# Patient Record
Sex: FEMALE | Race: WHITE | NOT HISPANIC OR LATINO
[De-identification: names, ages, dates, MRNs, and addresses within clinical notes are randomized per-mention and may not be internally consistent; named-entity substitution may affect disease eponyms.]

---

## 2018-05-25 ENCOUNTER — APPOINTMENT (OUTPATIENT)
Dept: CARDIOLOGY | Facility: CLINIC | Age: 78
End: 2018-05-25

## 2018-06-01 ENCOUNTER — NON-APPOINTMENT (OUTPATIENT)
Age: 78
End: 2018-06-01

## 2018-06-01 ENCOUNTER — APPOINTMENT (OUTPATIENT)
Dept: CARDIOLOGY | Facility: CLINIC | Age: 78
End: 2018-06-01
Payer: MEDICARE

## 2018-06-01 VITALS
HEIGHT: 63 IN | RESPIRATION RATE: 17 BRPM | WEIGHT: 147 LBS | DIASTOLIC BLOOD PRESSURE: 76 MMHG | BODY MASS INDEX: 26.05 KG/M2 | OXYGEN SATURATION: 96 % | SYSTOLIC BLOOD PRESSURE: 158 MMHG | HEART RATE: 76 BPM

## 2018-06-01 PROCEDURE — 99204 OFFICE O/P NEW MOD 45 MIN: CPT

## 2018-06-01 PROCEDURE — 93000 ELECTROCARDIOGRAM COMPLETE: CPT

## 2018-07-06 ENCOUNTER — APPOINTMENT (OUTPATIENT)
Dept: CARDIOLOGY | Facility: CLINIC | Age: 78
End: 2018-07-06
Payer: MEDICARE

## 2018-07-06 PROCEDURE — 93306 TTE W/DOPPLER COMPLETE: CPT

## 2018-07-06 PROCEDURE — 93015 CV STRESS TEST SUPVJ I&R: CPT

## 2018-07-18 ENCOUNTER — APPOINTMENT (OUTPATIENT)
Dept: HEMATOLOGY ONCOLOGY | Facility: CLINIC | Age: 78
End: 2018-07-18
Payer: MEDICARE

## 2018-07-18 VITALS
WEIGHT: 144 LBS | DIASTOLIC BLOOD PRESSURE: 56 MMHG | HEART RATE: 60 BPM | TEMPERATURE: 98.3 F | BODY MASS INDEX: 28.27 KG/M2 | SYSTOLIC BLOOD PRESSURE: 130 MMHG | HEIGHT: 60 IN

## 2018-07-18 DIAGNOSIS — Z98.890 OTHER SPECIFIED POSTPROCEDURAL STATES: ICD-10-CM

## 2018-07-18 DIAGNOSIS — Z92.89 PERSONAL HISTORY OF OTHER MEDICAL TREATMENT: ICD-10-CM

## 2018-07-18 DIAGNOSIS — Z63.4 DISAPPEARANCE AND DEATH OF FAMILY MEMBER: ICD-10-CM

## 2018-07-18 PROCEDURE — 99215 OFFICE O/P EST HI 40 MIN: CPT

## 2018-07-18 RX ORDER — LISINOPRIL 40 MG/1
40 TABLET ORAL
Refills: 0 | Status: ACTIVE | COMMUNITY

## 2018-07-18 RX ORDER — CALCIUM CARBONATE/VITAMIN D3 600 MG-20
600-400 TABLET,CHEWABLE ORAL
Refills: 0 | Status: DISCONTINUED | COMMUNITY
End: 2018-07-18

## 2018-07-18 RX ORDER — CALCITONIN SALMON 200 [IU]/1
200 SPRAY, METERED NASAL
Refills: 0 | Status: ACTIVE | COMMUNITY

## 2018-07-18 SDOH — SOCIAL STABILITY - SOCIAL INSECURITY: DISSAPEARANCE AND DEATH OF FAMILY MEMBER: Z63.4

## 2018-07-31 LAB
ALBUMIN SERPL ELPH-MCNC: 4.5 G/DL
ALP BLD-CCNC: 82 U/L
ALT SERPL-CCNC: 14 U/L
ANION GAP SERPL CALC-SCNC: 13 MMOL/L
AST SERPL-CCNC: 22 U/L
BASOPHILS # BLD AUTO: 0.04 K/UL
BASOPHILS NFR BLD AUTO: 0.4 %
BILIRUB SERPL-MCNC: 0.6 MG/DL
BUN SERPL-MCNC: 22 MG/DL
CALCIUM SERPL-MCNC: 9.8 MG/DL
CHLORIDE SERPL-SCNC: 98 MMOL/L
CO2 SERPL-SCNC: 27 MMOL/L
CREAT SERPL-MCNC: 0.83 MG/DL
CRP SERPL-MCNC: 0.22 MG/DL
EOSINOPHIL # BLD AUTO: 0.54 K/UL
EOSINOPHIL NFR BLD AUTO: 5.7 %
GLUCOSE SERPL-MCNC: 110 MG/DL
HCT VFR BLD CALC: 42 %
HGB BLD-MCNC: 14.2 G/DL
IMM GRANULOCYTES NFR BLD AUTO: 0.3 %
IRON SATN MFR SERPL: 31 %
IRON SERPL-MCNC: 88 UG/DL
LYMPHOCYTES # BLD AUTO: 2.43 K/UL
LYMPHOCYTES NFR BLD AUTO: 25.7 %
MAN DIFF?: NORMAL
MCHC RBC-ENTMCNC: 31.1 PG
MCHC RBC-ENTMCNC: 33.8 GM/DL
MCV RBC AUTO: 92.1 FL
MONOCYTES # BLD AUTO: 0.59 K/UL
MONOCYTES NFR BLD AUTO: 6.2 %
NEUTROPHILS # BLD AUTO: 5.83 K/UL
NEUTROPHILS NFR BLD AUTO: 61.7 %
PLATELET # BLD AUTO: 289 K/UL
POTASSIUM SERPL-SCNC: 4.5 MMOL/L
PROT SERPL-MCNC: 7.4 G/DL
RBC # BLD: 4.56 M/UL
RBC # FLD: 13 %
SODIUM SERPL-SCNC: 138 MMOL/L
TIBC SERPL-MCNC: 280 UG/DL
UIBC SERPL-MCNC: 192 UG/DL
WBC # FLD AUTO: 9.46 K/UL

## 2018-08-01 LAB
ALBUMIN MFR SERPL ELPH: 57.7 %
ALBUMIN SERPL-MCNC: 4.3 G/DL
ALBUMIN/GLOB SERPL: 1.4 RATIO
ALPHA1 GLOB MFR SERPL ELPH: 3.5 %
ALPHA1 GLOB SERPL ELPH-MCNC: 0.3 G/DL
ALPHA2 GLOB MFR SERPL ELPH: 9 %
ALPHA2 GLOB SERPL ELPH-MCNC: 0.7 G/DL
B-GLOBULIN MFR SERPL ELPH: 12.3 %
B-GLOBULIN SERPL ELPH-MCNC: 0.9 G/DL
FERRITIN SERPL-MCNC: 234 NG/ML
GAMMA GLOB FLD ELPH-MCNC: 1.3 G/DL
GAMMA GLOB MFR SERPL ELPH: 17.5 %
INTERPRETATION SERPL IEP-IMP: NORMAL
PROT SERPL-MCNC: 7.4 G/DL
PROT SERPL-MCNC: 7.4 G/DL

## 2018-08-02 LAB — ANA SER IF-ACNC: NEGATIVE

## 2018-08-14 ENCOUNTER — APPOINTMENT (OUTPATIENT)
Dept: HEMATOLOGY ONCOLOGY | Facility: CLINIC | Age: 78
End: 2018-08-14
Payer: MEDICARE

## 2018-08-14 VITALS
BODY MASS INDEX: 28.12 KG/M2 | DIASTOLIC BLOOD PRESSURE: 56 MMHG | WEIGHT: 144 LBS | HEART RATE: 60 BPM | TEMPERATURE: 98.5 F | SYSTOLIC BLOOD PRESSURE: 124 MMHG

## 2018-08-14 DIAGNOSIS — R79.89 OTHER SPECIFIED ABNORMAL FINDINGS OF BLOOD CHEMISTRY: ICD-10-CM

## 2018-08-14 PROCEDURE — 99213 OFFICE O/P EST LOW 20 MIN: CPT

## 2018-10-31 ENCOUNTER — APPOINTMENT (OUTPATIENT)
Dept: ORTHOPEDIC SURGERY | Facility: CLINIC | Age: 78
End: 2018-10-31

## 2019-04-27 ENCOUNTER — APPOINTMENT (OUTPATIENT)
Dept: ULTRASOUND IMAGING | Facility: HOSPITAL | Age: 79
End: 2019-04-27
Payer: MEDICARE

## 2019-04-27 ENCOUNTER — OUTPATIENT (OUTPATIENT)
Dept: OUTPATIENT SERVICES | Facility: HOSPITAL | Age: 79
LOS: 1 days | End: 2019-04-27
Payer: COMMERCIAL

## 2019-04-27 DIAGNOSIS — Z00.8 ENCOUNTER FOR OTHER GENERAL EXAMINATION: ICD-10-CM

## 2019-04-27 DIAGNOSIS — Z98.89 OTHER SPECIFIED POSTPROCEDURAL STATES: Chronic | ICD-10-CM

## 2019-04-27 DIAGNOSIS — Z90.710 ACQUIRED ABSENCE OF BOTH CERVIX AND UTERUS: Chronic | ICD-10-CM

## 2019-04-27 PROCEDURE — 93880 EXTRACRANIAL BILAT STUDY: CPT | Mod: 26

## 2019-04-27 PROCEDURE — 93880 EXTRACRANIAL BILAT STUDY: CPT

## 2019-05-02 ENCOUNTER — APPOINTMENT (OUTPATIENT)
Dept: ULTRASOUND IMAGING | Facility: HOSPITAL | Age: 79
End: 2019-05-02
Payer: MEDICARE

## 2019-05-02 ENCOUNTER — APPOINTMENT (OUTPATIENT)
Dept: MAMMOGRAPHY | Facility: HOSPITAL | Age: 79
End: 2019-05-02
Payer: MEDICARE

## 2019-05-02 ENCOUNTER — APPOINTMENT (OUTPATIENT)
Dept: RADIOLOGY | Facility: HOSPITAL | Age: 79
End: 2019-05-02
Payer: MEDICARE

## 2019-05-02 ENCOUNTER — OUTPATIENT (OUTPATIENT)
Dept: OUTPATIENT SERVICES | Facility: HOSPITAL | Age: 79
LOS: 1 days | End: 2019-05-02
Payer: COMMERCIAL

## 2019-05-02 DIAGNOSIS — Z00.8 ENCOUNTER FOR OTHER GENERAL EXAMINATION: ICD-10-CM

## 2019-05-02 DIAGNOSIS — Z98.89 OTHER SPECIFIED POSTPROCEDURAL STATES: Chronic | ICD-10-CM

## 2019-05-02 DIAGNOSIS — Z90.710 ACQUIRED ABSENCE OF BOTH CERVIX AND UTERUS: Chronic | ICD-10-CM

## 2019-05-02 PROCEDURE — 77067 SCR MAMMO BI INCL CAD: CPT

## 2019-05-02 PROCEDURE — 76641 ULTRASOUND BREAST COMPLETE: CPT

## 2019-05-02 PROCEDURE — 77067 SCR MAMMO BI INCL CAD: CPT | Mod: 26

## 2019-05-02 PROCEDURE — 77080 DXA BONE DENSITY AXIAL: CPT | Mod: 26

## 2019-05-02 PROCEDURE — 77063 BREAST TOMOSYNTHESIS BI: CPT

## 2019-05-02 PROCEDURE — 77063 BREAST TOMOSYNTHESIS BI: CPT | Mod: 26

## 2019-05-02 PROCEDURE — 76641 ULTRASOUND BREAST COMPLETE: CPT | Mod: 26,50

## 2019-05-02 PROCEDURE — 77080 DXA BONE DENSITY AXIAL: CPT

## 2019-10-09 ENCOUNTER — APPOINTMENT (OUTPATIENT)
Dept: ULTRASOUND IMAGING | Facility: HOSPITAL | Age: 79
End: 2019-10-09
Payer: MEDICARE

## 2019-10-09 ENCOUNTER — OUTPATIENT (OUTPATIENT)
Dept: OUTPATIENT SERVICES | Facility: HOSPITAL | Age: 79
LOS: 1 days | End: 2019-10-09
Payer: COMMERCIAL

## 2019-10-09 DIAGNOSIS — Z90.710 ACQUIRED ABSENCE OF BOTH CERVIX AND UTERUS: Chronic | ICD-10-CM

## 2019-10-09 DIAGNOSIS — Z00.8 ENCOUNTER FOR OTHER GENERAL EXAMINATION: ICD-10-CM

## 2019-10-09 DIAGNOSIS — Z98.89 OTHER SPECIFIED POSTPROCEDURAL STATES: Chronic | ICD-10-CM

## 2019-10-09 PROCEDURE — 76856 US EXAM PELVIC COMPLETE: CPT | Mod: 26

## 2019-10-09 PROCEDURE — 76700 US EXAM ABDOM COMPLETE: CPT | Mod: 26

## 2019-10-09 PROCEDURE — 76856 US EXAM PELVIC COMPLETE: CPT

## 2019-10-09 PROCEDURE — 76700 US EXAM ABDOM COMPLETE: CPT

## 2020-02-19 ENCOUNTER — APPOINTMENT (OUTPATIENT)
Dept: HEMATOLOGY ONCOLOGY | Facility: CLINIC | Age: 80
End: 2020-02-19

## 2020-12-22 ENCOUNTER — OUTPATIENT (OUTPATIENT)
Dept: OUTPATIENT SERVICES | Facility: HOSPITAL | Age: 80
LOS: 1 days | End: 2020-12-22
Payer: COMMERCIAL

## 2020-12-22 ENCOUNTER — APPOINTMENT (OUTPATIENT)
Dept: CT IMAGING | Facility: HOSPITAL | Age: 80
End: 2020-12-22
Payer: MEDICARE

## 2020-12-22 DIAGNOSIS — Z98.89 OTHER SPECIFIED POSTPROCEDURAL STATES: Chronic | ICD-10-CM

## 2020-12-22 DIAGNOSIS — Z90.710 ACQUIRED ABSENCE OF BOTH CERVIX AND UTERUS: Chronic | ICD-10-CM

## 2020-12-22 DIAGNOSIS — Z00.8 ENCOUNTER FOR OTHER GENERAL EXAMINATION: ICD-10-CM

## 2020-12-22 PROCEDURE — 74177 CT ABD & PELVIS W/CONTRAST: CPT

## 2020-12-22 PROCEDURE — 74177 CT ABD & PELVIS W/CONTRAST: CPT | Mod: 26

## 2020-12-28 ENCOUNTER — INPATIENT (INPATIENT)
Facility: HOSPITAL | Age: 80
LOS: 2 days | Discharge: HOME | DRG: 391 | End: 2020-12-31
Attending: STUDENT IN AN ORGANIZED HEALTH CARE EDUCATION/TRAINING PROGRAM | Admitting: INTERNAL MEDICINE
Payer: COMMERCIAL

## 2020-12-28 VITALS
TEMPERATURE: 98 F | DIASTOLIC BLOOD PRESSURE: 64 MMHG | RESPIRATION RATE: 18 BRPM | HEART RATE: 91 BPM | WEIGHT: 143.08 LBS | HEIGHT: 62 IN | SYSTOLIC BLOOD PRESSURE: 155 MMHG | OXYGEN SATURATION: 99 %

## 2020-12-28 DIAGNOSIS — N39.0 URINARY TRACT INFECTION, SITE NOT SPECIFIED: ICD-10-CM

## 2020-12-28 DIAGNOSIS — Z98.89 OTHER SPECIFIED POSTPROCEDURAL STATES: Chronic | ICD-10-CM

## 2020-12-28 DIAGNOSIS — Z90.710 ACQUIRED ABSENCE OF BOTH CERVIX AND UTERUS: Chronic | ICD-10-CM

## 2020-12-28 LAB
ALBUMIN SERPL ELPH-MCNC: 4 G/DL — SIGNIFICANT CHANGE UP (ref 3.3–5)
ALP SERPL-CCNC: 99 U/L — SIGNIFICANT CHANGE UP (ref 40–120)
ALT FLD-CCNC: 34 U/L — SIGNIFICANT CHANGE UP (ref 10–45)
ANION GAP SERPL CALC-SCNC: 12 MMOL/L — SIGNIFICANT CHANGE UP (ref 5–17)
APPEARANCE UR: CLEAR — SIGNIFICANT CHANGE UP
AST SERPL-CCNC: 53 U/L — HIGH (ref 10–40)
BACTERIA # UR AUTO: ABNORMAL /HPF
BASOPHILS # BLD AUTO: 0.05 K/UL — SIGNIFICANT CHANGE UP (ref 0–0.2)
BASOPHILS NFR BLD AUTO: 0.2 % — SIGNIFICANT CHANGE UP (ref 0–2)
BILIRUB SERPL-MCNC: 0.7 MG/DL — SIGNIFICANT CHANGE UP (ref 0.2–1.2)
BILIRUB UR-MCNC: NEGATIVE — SIGNIFICANT CHANGE UP
BUN SERPL-MCNC: 30 MG/DL — HIGH (ref 7–23)
CALCIUM SERPL-MCNC: 9.8 MG/DL — SIGNIFICANT CHANGE UP (ref 8.4–10.5)
CHLORIDE SERPL-SCNC: 93 MMOL/L — LOW (ref 96–108)
CO2 SERPL-SCNC: 25 MMOL/L — SIGNIFICANT CHANGE UP (ref 22–31)
COLOR SPEC: YELLOW — SIGNIFICANT CHANGE UP
CREAT SERPL-MCNC: 1.05 MG/DL — SIGNIFICANT CHANGE UP (ref 0.5–1.3)
DIFF PNL FLD: ABNORMAL
EOSINOPHIL # BLD AUTO: 0.01 K/UL — SIGNIFICANT CHANGE UP (ref 0–0.5)
EOSINOPHIL NFR BLD AUTO: 0 % — SIGNIFICANT CHANGE UP (ref 0–6)
EPI CELLS # UR: SIGNIFICANT CHANGE UP
GLUCOSE SERPL-MCNC: 132 MG/DL — HIGH (ref 70–99)
GLUCOSE UR QL: NEGATIVE — SIGNIFICANT CHANGE UP
HCT VFR BLD CALC: 39.4 % — SIGNIFICANT CHANGE UP (ref 34.5–45)
HGB BLD-MCNC: 13.6 G/DL — SIGNIFICANT CHANGE UP (ref 11.5–15.5)
IMM GRANULOCYTES NFR BLD AUTO: 0.9 % — SIGNIFICANT CHANGE UP (ref 0–1.5)
KETONES UR-MCNC: NEGATIVE — SIGNIFICANT CHANGE UP
LACTATE SERPL-SCNC: 1.5 MMOL/L — SIGNIFICANT CHANGE UP (ref 0.7–2)
LEUKOCYTE ESTERASE UR-ACNC: ABNORMAL
LIDOCAIN IGE QN: 168 U/L — SIGNIFICANT CHANGE UP (ref 73–393)
LYMPHOCYTES # BLD AUTO: 0.58 K/UL — LOW (ref 1–3.3)
LYMPHOCYTES # BLD AUTO: 2.9 % — LOW (ref 13–44)
MCHC RBC-ENTMCNC: 32.2 PG — SIGNIFICANT CHANGE UP (ref 27–34)
MCHC RBC-ENTMCNC: 34.5 GM/DL — SIGNIFICANT CHANGE UP (ref 32–36)
MCV RBC AUTO: 93.1 FL — SIGNIFICANT CHANGE UP (ref 80–100)
MONOCYTES # BLD AUTO: 1.24 K/UL — HIGH (ref 0–0.9)
MONOCYTES NFR BLD AUTO: 6.1 % — SIGNIFICANT CHANGE UP (ref 2–14)
NEUTROPHILS # BLD AUTO: 18.18 K/UL — HIGH (ref 1.8–7.4)
NEUTROPHILS NFR BLD AUTO: 89.9 % — HIGH (ref 43–77)
NITRITE UR-MCNC: NEGATIVE — SIGNIFICANT CHANGE UP
NRBC # BLD: 0 /100 WBCS — SIGNIFICANT CHANGE UP (ref 0–0)
OB PNL STL: POSITIVE
PH UR: 6 — SIGNIFICANT CHANGE UP (ref 5–8)
PLATELET # BLD AUTO: 225 K/UL — SIGNIFICANT CHANGE UP (ref 150–400)
POTASSIUM SERPL-MCNC: 3.9 MMOL/L — SIGNIFICANT CHANGE UP (ref 3.5–5.3)
POTASSIUM SERPL-SCNC: 3.9 MMOL/L — SIGNIFICANT CHANGE UP (ref 3.5–5.3)
PROT SERPL-MCNC: 8.5 G/DL — HIGH (ref 6–8.3)
PROT UR-MCNC: NEGATIVE — SIGNIFICANT CHANGE UP
RBC # BLD: 4.23 M/UL — SIGNIFICANT CHANGE UP (ref 3.8–5.2)
RBC # FLD: 12.5 % — SIGNIFICANT CHANGE UP (ref 10.3–14.5)
RBC CASTS # UR COMP ASSIST: SIGNIFICANT CHANGE UP /HPF (ref 0–4)
SODIUM SERPL-SCNC: 130 MMOL/L — LOW (ref 135–145)
SP GR SPEC: 1.01 — SIGNIFICANT CHANGE UP (ref 1.01–1.02)
TROPONIN I SERPL-MCNC: <.017 NG/ML — LOW (ref 0.02–0.06)
UROBILINOGEN FLD QL: NEGATIVE — SIGNIFICANT CHANGE UP
WBC # BLD: 20.25 K/UL — HIGH (ref 3.8–10.5)
WBC # FLD AUTO: 20.25 K/UL — HIGH (ref 3.8–10.5)
WBC UR QL: ABNORMAL /HPF (ref 0–5)

## 2020-12-28 PROCEDURE — 99223 1ST HOSP IP/OBS HIGH 75: CPT

## 2020-12-28 PROCEDURE — 99285 EMERGENCY DEPT VISIT HI MDM: CPT

## 2020-12-28 PROCEDURE — 93010 ELECTROCARDIOGRAM REPORT: CPT

## 2020-12-28 PROCEDURE — 74176 CT ABD & PELVIS W/O CONTRAST: CPT | Mod: 26,MA

## 2020-12-28 PROCEDURE — 71045 X-RAY EXAM CHEST 1 VIEW: CPT | Mod: 26

## 2020-12-28 RX ORDER — LISINOPRIL 2.5 MG/1
40 TABLET ORAL DAILY
Refills: 0 | Status: DISCONTINUED | OUTPATIENT
Start: 2020-12-28 | End: 2020-12-31

## 2020-12-28 RX ORDER — CIPROFLOXACIN LACTATE 400MG/40ML
400 VIAL (ML) INTRAVENOUS ONCE
Refills: 0 | Status: COMPLETED | OUTPATIENT
Start: 2020-12-28 | End: 2020-12-28

## 2020-12-28 RX ORDER — AMLODIPINE BESYLATE 2.5 MG/1
5 TABLET ORAL DAILY
Refills: 0 | Status: DISCONTINUED | OUTPATIENT
Start: 2020-12-28 | End: 2020-12-31

## 2020-12-28 RX ORDER — ONDANSETRON 8 MG/1
4 TABLET, FILM COATED ORAL ONCE
Refills: 0 | Status: COMPLETED | OUTPATIENT
Start: 2020-12-28 | End: 2020-12-28

## 2020-12-28 RX ORDER — SODIUM CHLORIDE 9 MG/ML
1000 INJECTION INTRAMUSCULAR; INTRAVENOUS; SUBCUTANEOUS
Refills: 0 | Status: COMPLETED | OUTPATIENT
Start: 2020-12-28 | End: 2020-12-29

## 2020-12-28 RX ORDER — METRONIDAZOLE 500 MG
500 TABLET ORAL ONCE
Refills: 0 | Status: COMPLETED | OUTPATIENT
Start: 2020-12-28 | End: 2020-12-28

## 2020-12-28 RX ORDER — ACETAMINOPHEN 500 MG
650 TABLET ORAL EVERY 6 HOURS
Refills: 0 | Status: DISCONTINUED | OUTPATIENT
Start: 2020-12-28 | End: 2020-12-31

## 2020-12-28 RX ORDER — ONDANSETRON 8 MG/1
4 TABLET, FILM COATED ORAL EVERY 6 HOURS
Refills: 0 | Status: DISCONTINUED | OUTPATIENT
Start: 2020-12-28 | End: 2020-12-31

## 2020-12-28 RX ORDER — SODIUM CHLORIDE 9 MG/ML
1000 INJECTION INTRAMUSCULAR; INTRAVENOUS; SUBCUTANEOUS ONCE
Refills: 0 | Status: COMPLETED | OUTPATIENT
Start: 2020-12-28 | End: 2020-12-28

## 2020-12-28 RX ADMIN — SODIUM CHLORIDE 1000 MILLILITER(S): 9 INJECTION INTRAMUSCULAR; INTRAVENOUS; SUBCUTANEOUS at 17:20

## 2020-12-28 RX ADMIN — Medication 100 MILLIGRAM(S): at 20:15

## 2020-12-28 RX ADMIN — Medication 500 MILLIGRAM(S): at 21:15

## 2020-12-28 RX ADMIN — SODIUM CHLORIDE 1000 MILLILITER(S): 9 INJECTION INTRAMUSCULAR; INTRAVENOUS; SUBCUTANEOUS at 19:00

## 2020-12-28 RX ADMIN — ONDANSETRON 4 MILLIGRAM(S): 8 TABLET, FILM COATED ORAL at 22:43

## 2020-12-28 RX ADMIN — Medication 200 MILLIGRAM(S): at 21:20

## 2020-12-28 NOTE — H&P ADULT - CLICK TO LAUNCH ORM
Patient notified of test results and Dr Salguero's recommendations. Patient verbalized understanding and agreeable.     .

## 2020-12-28 NOTE — H&P ADULT - NSICDXPASTSURGICALHX_GEN_ALL_CORE_FT
PAST SURGICAL HISTORY:  S/P D&C (status post dilation and curettage) x3    S/P hernia repair 1993    S/P CESARIO (total abdominal hysterectomy)

## 2020-12-28 NOTE — H&P ADULT - NSICDXFAMILYHX_GEN_ALL_CORE_FT
FAMILY HISTORY:  Father  Still living? No  Family history of dementia, Age at diagnosis: Age Unknown    Mother  Still living? No  Family history of diabetes mellitus type II, Age at diagnosis: Age Unknown    Sibling  Still living? No  Family history of cancer, Age at diagnosis: Age Unknown

## 2020-12-28 NOTE — ED PROVIDER NOTE - CONSTITUTIONAL, MLM
Well appearing, awake, alert, oriented to person, place, time/situation and in mild distress. normal...

## 2020-12-28 NOTE — H&P ADULT - NSHPPHYSICALEXAM_GEN_ALL_CORE
Vital Signs (24 Hrs):  T(C): 36.6 (12-28-20 @ 16:14), Max: 36.6 (12-28-20 @ 16:14)  HR: 77 (12-28-20 @ 21:20) (77 - 91)  BP: 138/62 (12-28-20 @ 21:20) (138/62 - 155/65)  RR: 18 (12-28-20 @ 21:20) (16 - 18)  SpO2: 97% (12-28-20 @ 21:20) (97% - 100%)  Daily Height in cm: 157.48 (28 Dec 2020 16:14)

## 2020-12-28 NOTE — ED ADULT NURSE NOTE - OBJECTIVE STATEMENT
80 yr old female to ED A&Ox3 presents with +abd pain and diarrhea. Pt states pain and cramping gets worse when using bathroom. 80 yr old female to ED A&Ox3 presents with +abd pain and diarrhea. Pt states pain and cramping gets worse when using bathroom. Pt also reports +fall when running to go to the bathroom.  As per daughter-in-law pt had a accident and slipped while going to bathroom. +head trauma and LOC.  +bruising noted under right eye and +small abrasion to nose. No acute resp distress noted. Resp even and unlabored. Abd soft ND. No increased tenderness with palp. +BS. +PP. MONTE. Skin warm, dry and normal for race. 20 G IV placed to RT AC. Bloods obtained and sent. Safety maintained.

## 2020-12-28 NOTE — ED PROVIDER NOTE - OBJECTIVE STATEMENT
79 yo female c/o left sided abdominal pain today. pt has ho diverticulosis. no fevers or chills 79 yo female biba from home with  c/o left sided abdominal pain today with n/v/d with some blood and generalized weakness. too weak to walk at one time and  then she called ems pt has ho diverticulosis. no fevers or chills.

## 2020-12-28 NOTE — H&P ADULT - HISTORY OF PRESENT ILLNESS
79 y/o female with HTN, BIBA to the ED because of diffuse crampy abdominal pain, nausea, vomiting and diarrhea that started after lunch.  Pt states she had several bouts of watery diarrhea, crampy abdominal pain, she was feeling so weak and lightheaded.  After having loose BMs, she fell to the floor, because she was dizzy, lightheaded, felt so weak in her legs, but no LOC.  She lives alone, and she called EMS.  While in ED, she had 2 more episodes of loose BM, with some flecks of mucoid blood, and also had an episode of vomiting.  Pt denies sick contacts, no history of travel, no change in diet, no recent antibx use. Denies fever, chills, cough, chest pain, dysuria.  WBC noted to be 20,000 with left shift. Abd CT with diverticulosis but no diverticulitis.  She received a dose of Cipro IV and Flagyl IV in the ED.

## 2020-12-28 NOTE — H&P ADULT - ASSESSMENT
81 y/o female with HTN, presents with diffuse crampy abdominal pain, nausea, vomiting, diarrhea and generalized weakness, lightheadedness.    Acute gastroenteritis, Hyponatremia  Probable UTI    Admit  IV hydration with NS  Hold Thiazide for now  Cont Amlodipine, lisinopril  Antiemetics prn, analgesics prn  Empiric flagyl  ffup labs, ffup culture, stool for ova and parasites, stool culture  DVT prophylaxis    IMPROVE VTE Individual Risk Assessment  RISK                                                                Points  [  ] Previous VTE                                                  3  [  ] Thrombophilia                                               2  [  ] Lower limb paralysis                                      2        (unable to hold up >15 seconds)    [  ] Current Cancer                                              2         (within 6 months)  [  ] Immobilization > 24 hrs                                1  [  ] ICU/CCU stay > 24 hours                              1  [x  ] Age > 60                                                      1  IMPROVE VTE Score __1__  IMPROVE Score 0-1: Low Risk, No VTE prophylaxis required for most patients, encourage ambulation.   IMPROVE Score 2-3: At risk, pharmacologic VTE prophylaxis is indicated for most patients (in the absence of a contraindication)  IMPROVE Score > or = 4: High Risk, pharmacologic VTE prophylaxis is indicated for most patients (in the absence of a contraindication)

## 2020-12-28 NOTE — ED PROVIDER NOTE - FAMILY HISTORY
Father  Still living? No  Family history of dementia, Age at diagnosis: Age Unknown     Mother  Still living? No  Family history of diabetes mellitus type II, Age at diagnosis: Age Unknown     Sibling  Still living? No  Family history of cancer, Age at diagnosis: Age Unknown

## 2020-12-28 NOTE — ED PROVIDER NOTE - PSH
S/P D&C (status post dilation and curettage)  x3  S/P hernia repair  1993  S/P CESARIO (total abdominal hysterectomy)

## 2020-12-29 LAB
ALBUMIN SERPL ELPH-MCNC: 2.8 G/DL — LOW (ref 3.3–5)
ALP SERPL-CCNC: 69 U/L — SIGNIFICANT CHANGE UP (ref 40–120)
ALT FLD-CCNC: 24 U/L — SIGNIFICANT CHANGE UP (ref 10–45)
ANION GAP SERPL CALC-SCNC: 10 MMOL/L — SIGNIFICANT CHANGE UP (ref 5–17)
AST SERPL-CCNC: 27 U/L — SIGNIFICANT CHANGE UP (ref 10–40)
BASOPHILS # BLD AUTO: 0.02 K/UL — SIGNIFICANT CHANGE UP (ref 0–0.2)
BASOPHILS NFR BLD AUTO: 0.2 % — SIGNIFICANT CHANGE UP (ref 0–2)
BILIRUB SERPL-MCNC: 1 MG/DL — SIGNIFICANT CHANGE UP (ref 0.2–1.2)
BUN SERPL-MCNC: 18 MG/DL — SIGNIFICANT CHANGE UP (ref 7–23)
CALCIUM SERPL-MCNC: 8.4 MG/DL — SIGNIFICANT CHANGE UP (ref 8.4–10.5)
CHLORIDE SERPL-SCNC: 99 MMOL/L — SIGNIFICANT CHANGE UP (ref 96–108)
CO2 SERPL-SCNC: 25 MMOL/L — SIGNIFICANT CHANGE UP (ref 22–31)
CREAT SERPL-MCNC: 0.82 MG/DL — SIGNIFICANT CHANGE UP (ref 0.5–1.3)
EOSINOPHIL # BLD AUTO: 0.06 K/UL — SIGNIFICANT CHANGE UP (ref 0–0.5)
EOSINOPHIL NFR BLD AUTO: 0.6 % — SIGNIFICANT CHANGE UP (ref 0–6)
GLUCOSE SERPL-MCNC: 90 MG/DL — SIGNIFICANT CHANGE UP (ref 70–99)
HCT VFR BLD CALC: 32.5 % — LOW (ref 34.5–45)
HGB BLD-MCNC: 11.2 G/DL — LOW (ref 11.5–15.5)
IMM GRANULOCYTES NFR BLD AUTO: 0.4 % — SIGNIFICANT CHANGE UP (ref 0–1.5)
LYMPHOCYTES # BLD AUTO: 1.52 K/UL — SIGNIFICANT CHANGE UP (ref 1–3.3)
LYMPHOCYTES # BLD AUTO: 14.7 % — SIGNIFICANT CHANGE UP (ref 13–44)
MAGNESIUM SERPL-MCNC: 1.7 MG/DL — SIGNIFICANT CHANGE UP (ref 1.6–2.6)
MCHC RBC-ENTMCNC: 31.4 PG — SIGNIFICANT CHANGE UP (ref 27–34)
MCHC RBC-ENTMCNC: 34.5 GM/DL — SIGNIFICANT CHANGE UP (ref 32–36)
MCV RBC AUTO: 91 FL — SIGNIFICANT CHANGE UP (ref 80–100)
MONOCYTES # BLD AUTO: 0.59 K/UL — SIGNIFICANT CHANGE UP (ref 0–0.9)
MONOCYTES NFR BLD AUTO: 5.7 % — SIGNIFICANT CHANGE UP (ref 2–14)
NEUTROPHILS # BLD AUTO: 8.14 K/UL — HIGH (ref 1.8–7.4)
NEUTROPHILS NFR BLD AUTO: 78.4 % — HIGH (ref 43–77)
NRBC # BLD: 0 /100 WBCS — SIGNIFICANT CHANGE UP (ref 0–0)
PLATELET # BLD AUTO: 201 K/UL — SIGNIFICANT CHANGE UP (ref 150–400)
POTASSIUM SERPL-MCNC: 3.2 MMOL/L — LOW (ref 3.5–5.3)
POTASSIUM SERPL-SCNC: 3.2 MMOL/L — LOW (ref 3.5–5.3)
PROT SERPL-MCNC: 6.1 G/DL — SIGNIFICANT CHANGE UP (ref 6–8.3)
RBC # BLD: 3.57 M/UL — LOW (ref 3.8–5.2)
RBC # FLD: 12.5 % — SIGNIFICANT CHANGE UP (ref 10.3–14.5)
SARS-COV-2 RNA SPEC QL NAA+PROBE: SIGNIFICANT CHANGE UP
SODIUM SERPL-SCNC: 134 MMOL/L — LOW (ref 135–145)
WBC # BLD: 10.37 K/UL — SIGNIFICANT CHANGE UP (ref 3.8–10.5)
WBC # FLD AUTO: 10.37 K/UL — SIGNIFICANT CHANGE UP (ref 3.8–10.5)

## 2020-12-29 PROCEDURE — 73030 X-RAY EXAM OF SHOULDER: CPT | Mod: 26,LT

## 2020-12-29 PROCEDURE — 73080 X-RAY EXAM OF ELBOW: CPT | Mod: 26,LT

## 2020-12-29 PROCEDURE — 70450 CT HEAD/BRAIN W/O DYE: CPT | Mod: 26

## 2020-12-29 PROCEDURE — 99233 SBSQ HOSP IP/OBS HIGH 50: CPT

## 2020-12-29 RX ORDER — POTASSIUM CHLORIDE 20 MEQ
40 PACKET (EA) ORAL EVERY 4 HOURS
Refills: 0 | Status: COMPLETED | OUTPATIENT
Start: 2020-12-29 | End: 2020-12-29

## 2020-12-29 RX ORDER — MAGNESIUM SULFATE 500 MG/ML
1 VIAL (ML) INJECTION
Refills: 0 | Status: COMPLETED | OUTPATIENT
Start: 2020-12-29 | End: 2020-12-29

## 2020-12-29 RX ORDER — MAGNESIUM SULFATE 500 MG/ML
2 VIAL (ML) INJECTION ONCE
Refills: 0 | Status: DISCONTINUED | OUTPATIENT
Start: 2020-12-29 | End: 2020-12-29

## 2020-12-29 RX ORDER — METRONIDAZOLE 500 MG
500 TABLET ORAL EVERY 8 HOURS
Refills: 0 | Status: DISCONTINUED | OUTPATIENT
Start: 2020-12-29 | End: 2020-12-29

## 2020-12-29 RX ORDER — LIDOCAINE 4 G/100G
1 CREAM TOPICAL DAILY
Refills: 0 | Status: DISCONTINUED | OUTPATIENT
Start: 2020-12-29 | End: 2020-12-31

## 2020-12-29 RX ADMIN — Medication 650 MILLIGRAM(S): at 01:18

## 2020-12-29 RX ADMIN — Medication 650 MILLIGRAM(S): at 17:33

## 2020-12-29 RX ADMIN — SODIUM CHLORIDE 75 MILLILITER(S): 9 INJECTION INTRAMUSCULAR; INTRAVENOUS; SUBCUTANEOUS at 00:15

## 2020-12-29 RX ADMIN — AMLODIPINE BESYLATE 5 MILLIGRAM(S): 2.5 TABLET ORAL at 05:35

## 2020-12-29 RX ADMIN — LISINOPRIL 40 MILLIGRAM(S): 2.5 TABLET ORAL at 05:35

## 2020-12-29 RX ADMIN — Medication 40 MILLIEQUIVALENT(S): at 13:01

## 2020-12-29 RX ADMIN — Medication 100 GRAM(S): at 10:41

## 2020-12-29 RX ADMIN — Medication 1 TABLET(S): at 05:35

## 2020-12-29 RX ADMIN — Medication 650 MILLIGRAM(S): at 02:18

## 2020-12-29 RX ADMIN — Medication 650 MILLIGRAM(S): at 18:28

## 2020-12-29 RX ADMIN — Medication 1 TABLET(S): at 13:03

## 2020-12-29 RX ADMIN — LIDOCAINE 1 PATCH: 4 CREAM TOPICAL at 19:38

## 2020-12-29 RX ADMIN — Medication 40 MILLIEQUIVALENT(S): at 09:13

## 2020-12-29 RX ADMIN — Medication 100 MILLIGRAM(S): at 05:36

## 2020-12-29 RX ADMIN — LIDOCAINE 1 PATCH: 4 CREAM TOPICAL at 13:00

## 2020-12-29 NOTE — PHYSICAL THERAPY INITIAL EVALUATION ADULT - IMPAIRMENTS FOUND, PT EVAL
gait, locomotion, and balance
Have Your Skin Lesions Been Treated?: not been treated
Is This A New Presentation, Or A Follow-Up?: Skin Lesions
How Severe Is Your Skin Lesion?: mild

## 2020-12-29 NOTE — PROGRESS NOTE ADULT - ASSESSMENT
79 y/o female with HTN, presents with diffuse crampy abdominal pain, nausea, vomiting, diarrhea and generalized weakness, lightheadedness.  Suspect due to viral gastroenteritis.    *Abdominal Pain:  *Mucousy stools:  Rule out infectious etiology  CTAP with diverticulosis   Obtain stool O+P and culture  Cont IVF  Monitor BM--has not had BM today  Hold abx for now as symptoms most likely viral   Afebrile and leukocytosis resolved    *Leukocytosis:  suspect due to dehydration  Resolved  Hold abx for now and continue to monitor    *Syncope:  suspect due to dehydration  Cont IVF  Troponin negative  Obtain CT head   Obtain TTE to be complete  PT recommended home with home PT   Hold Thiazide for now    *HTN:  Cont Amlodipine, lisinopril    Dispo: Home pending further workup. Suspect within 24 hours.  Case d/w son Дмитрий    81 y/o female with HTN, presents with diffuse crampy abdominal pain, nausea, vomiting, diarrhea and generalized weakness, lightheadedness.  Suspect due to viral gastroenteritis.    *Abdominal Pain:  *Mucousy stools:  Rule out infectious etiology  CTAP with diverticulosis   Obtain stool O+P and culture  Cont IVF  Monitor BM--has not had BM today  Hold abx for now as symptoms most likely viral   Afebrile and leukocytosis resolved    *Leukocytosis:  suspect due to dehydration  Resolved  Hold abx for now and continue to monitor    *HypoKalemia:  K 3.2  due to GI losses  replete and monitor     *Syncope:  suspect due to dehydration  Cont IVF  Troponin negative  Obtain CT head   Obtain TTE to be complete  PT recommended home with home PT   Hold Thiazide for now    *HTN:  Cont Amlodipine, lisinopril    Dispo: Home pending further workup. Suspect within 24 hours.  Case d/w son Дмитрий    81 y/o female with HTN, presents with diffuse crampy abdominal pain, nausea, vomiting, diarrhea and generalized weakness, lightheadedness.  Suspect due to viral gastroenteritis.    *Abdominal Pain:  *Mucousy stools/diarrhea   -Rule out infectious etiology  -CTAP with diverticulosis   -Obtain stool O+P and culture  -Monitor BM--had formed BM today  -Hold abx for now as symptoms most likely viral   -Afebrile and leukocytosis resolved    *Leukocytosis:  suspect due to dehydration  Resolved  Hold abx for now and continue to monitor    *HypoKalemia:  K 3.2  due to GI losses  replete and monitor     *Hyponatremia  Na 134  s/p IVF  PO diet encouraged  Follow up AM BMP    *Anemia  -Likely due to diverticulosis   -No overt signs of bleeding, monitor H/H  -Continue to monitor     *Syncope:  suspect due to dehydration ?vasovagal etiology  Off IVF  Troponin negative  CT head performed  Obtain TTE to be complete  PT recommended home with home PT   Hold Thiazide for now    *HTN:  Cont Amlodipine, lisinopril  Monitor vitals    Dispo: Home pending further workup. Suspect within 24 hours.  Case d/w son Дмитрий

## 2020-12-29 NOTE — PHYSICAL THERAPY INITIAL EVALUATION ADULT - PERTINENT HX OF CURRENT PROBLEM, REHAB EVAL
pt BIBA to ED due to abdominal pain, nausea, vomiting & diarrhea, pt s/p fall due to feeling lightheaded 2* diarrhea

## 2020-12-29 NOTE — PROGRESS NOTE ADULT - SUBJECTIVE AND OBJECTIVE BOX
Patient is a 80y old  Female who presents with a chief complaint of abdominal pain, diarrhea, vomiting, generalized weakness (28 Dec 2020 23:05)   No acute issues     Patient seen and examined at bedside.    ALLERGIES:  No Known Allergies    MEDICATIONS  (STANDING):  amLODIPine   Tablet 5 milliGRAM(s) Oral daily  calcium carbonate 1250 mG  + Vitamin D (OsCal 500 + D) 1 Tablet(s) Oral two times a day  lidocaine   Patch 1 Patch Transdermal daily  lisinopril 40 milliGRAM(s) Oral daily    MEDICATIONS  (PRN):  acetaminophen   Tablet .. 650 milliGRAM(s) Oral every 6 hours PRN Temp greater or equal to 38C (100.4F), Mild Pain (1 - 3)  ondansetron Injectable 4 milliGRAM(s) IV Push every 6 hours PRN Nausea and/or Vomiting    Vital Signs Last 24 Hrs  T(F): 98.1 (29 Dec 2020 11:20), Max: 98.4 (29 Dec 2020 00:50)  HR: 74 (29 Dec 2020 11:20) (65 - 91)  BP: 129/48 (29 Dec 2020 11:20) (124/54 - 155/65)  RR: 15 (29 Dec 2020 11:20) (15 - 18)  SpO2: 96% (29 Dec 2020 11:20) (92% - 100%)  I&O's Summary    28 Dec 2020 07:  -  29 Dec 2020 07:00  --------------------------------------------------------  IN: 0 mL / OUT: 1100 mL / NET: -1100 mL    29 Dec 2020 07:01  -  29 Dec 2020 14:28  --------------------------------------------------------  IN: 400 mL / OUT: 0 mL / NET: 400 mL      BMI (kg/m2): 28.7 (20 @ 01:02)  PHYSICAL EXAM:  General: NAD, A/O x 3, elderly, small ecchymosis R forehead  ENT: MMM, no thrush  Neck: Supple, No JVD  Lungs: Non labored breathing,  Clear to auscultation bilaterally,   Cardio: RRR, S1/S2,no pitting edema bilaterally  Abdomen: Soft, Nontender, Nondistended; Bowel sounds present  Extremities: No calf tenderness, moves all extremities    LABS:                        11.2   10.37 )-----------( 201      ( 29 Dec 2020 06:00 )             32.5           134  |  99  |  18  ----------------------------<  90  3.2   |  25  |  0.82    Ca    8.4      29 Dec 2020 06:00  Mg     1.7         TPro  6.1  /  Alb  2.8  /  TBili  1.0  /  DBili  x   /  AST  27  /  ALT  24  /  AlkPhos  69       eGFR if Non African American: 68 mL/min/1.73M2 (20 @ 06:00)  eGFR if African American: 78 mL/min/1.73M2 (20 @ 06:00)       Lactate, Blood: 1.5 mmol/L ( @ 21:30)    CARDIAC MARKERS ( 28 Dec 2020 17:20 )  <.017 ng/mL / x     / x     / x     / x                            Urinalysis Basic - ( 28 Dec 2020 19:25 )    Color: Yellow / Appearance: Clear / S.010 / pH: x  Gluc: x / Ketone: Negative  / Bili: Negative / Urobili: Negative   Blood: x / Protein: Negative / Nitrite: Negative   Leuk Esterase: Moderate / RBC: 0-4 /HPF / WBC 11-25 /HPF   Sq Epi: x / Non Sq Epi: Neg.-Few / Bacteria: Trace /HPF          RADIOLOGY & ADDITIONAL TESTS:    Care Discussed with Consultants/Other Providers:    Patient is a 80y old  Female who presents with a chief complaint of abdominal pain, diarrhea, vomiting, generalized weakness (28 Dec 2020 23:05)   No acute issues     Patient seen and examined at bedside. Reports improvement in diarrhea, more formed stool.   Tolerating diet. Feels back to baseline.     ALLERGIES:  No Known Allergies    MEDICATIONS  (STANDING):  amLODIPine   Tablet 5 milliGRAM(s) Oral daily  calcium carbonate 1250 mG  + Vitamin D (OsCal 500 + D) 1 Tablet(s) Oral two times a day  lidocaine   Patch 1 Patch Transdermal daily  lisinopril 40 milliGRAM(s) Oral daily    MEDICATIONS  (PRN):  acetaminophen   Tablet .. 650 milliGRAM(s) Oral every 6 hours PRN Temp greater or equal to 38C (100.4F), Mild Pain (1 - 3)  ondansetron Injectable 4 milliGRAM(s) IV Push every 6 hours PRN Nausea and/or Vomiting    Vital Signs Last 24 Hrs  T(F): 98.1 (29 Dec 2020 11:20), Max: 98.4 (29 Dec 2020 00:50)  HR: 74 (29 Dec 2020 11:20) (65 - 91)  BP: 129/48 (29 Dec 2020 11:20) (124/54 - 155/65)  RR: 15 (29 Dec 2020 11:20) (15 - 18)  SpO2: 96% (29 Dec 2020 11:20) (92% - 100%)  I&O's Summary    28 Dec 2020 07:  -  29 Dec 2020 07:00  --------------------------------------------------------  IN: 0 mL / OUT: 1100 mL / NET: -1100 mL    29 Dec 2020 07:  -  29 Dec 2020 14:28  --------------------------------------------------------  IN: 400 mL / OUT: 0 mL / NET: 400 mL      BMI (kg/m2): 28.7 (20 @ 01:02)  PHYSICAL EXAM:  General: NAD, A/O x 3, elderly, small ecchymosis R forehead  ENT: MMM, no thrush  Neck: Supple, No JVD  Lungs: Non labored breathing,  Clear to auscultation bilaterally,   Cardio: RRR, S1/S2,no pitting edema bilaterally  Abdomen: Soft, Nontender, Nondistended; Bowel sounds present  Extremities: No calf tenderness, moves all extremities    LABS:                        11.2   10.37 )-----------( 201      ( 29 Dec 2020 06:00 )             32.5           134  |  99  |  18  ----------------------------<  90  3.2   |  25  |  0.82    Ca    8.4      29 Dec 2020 06:00  Mg     1.7         TPro  6.1  /  Alb  2.8  /  TBili  1.0  /  DBili  x   /  AST  27  /  ALT  24  /  AlkPhos  69       eGFR if Non African American: 68 mL/min/1.73M2 (20 @ 06:00)  eGFR if African American: 78 mL/min/1.73M2 (20 @ 06:00)       Lactate, Blood: 1.5 mmol/L ( @ 21:30)    CARDIAC MARKERS ( 28 Dec 2020 17:20 )  <.017 ng/mL / x     / x     / x     / x                            Urinalysis Basic - ( 28 Dec 2020 19:25 )    Color: Yellow / Appearance: Clear / S.010 / pH: x  Gluc: x / Ketone: Negative  / Bili: Negative / Urobili: Negative   Blood: x / Protein: Negative / Nitrite: Negative   Leuk Esterase: Moderate / RBC: 0-4 /HPF / WBC 11-25 /HPF   Sq Epi: x / Non Sq Epi: Neg.-Few / Bacteria: Trace /HPF          RADIOLOGY & ADDITIONAL TESTS:  < from: CT Head No Cont (20 @ 10:58) >    EXAM:  CT BRAIN      PROCEDURE DATE:  2020        INTERPRETATION:  CT head without IV contrast    CLINICAL INFORMATION:  Fall, ecchymosis about eyes    TECHNIQUE: Contiguous axial 5 mm sections were obtained through the head. Sagittal and coronal 2-D reformatted images were also obtained.   This scan was performed using automatic exposure control (radiation dose reduction software) to obtain a diagnostic image quality scan with patient dose as low as reasonably achievable.    FINDINGS:   No previous examinations are available for review.    The brain demonstrates mild periventricular white matter ischemia.   No acute cerebral cortical infarct is seen.  No intracranial hemorrhage is found.  No mass effect is found in the brain.    The ventricles, sulci and basal cisterns show age-appropriate atrophy.    The orbits are unremarkable.  The paranasal sinuses are significant for minimal mucosal thickening in the RIGHT maxillary sinus.  The nasal cavity appears intact.  The nasopharynx is symmetric.  The central skull base, petrous temporal bones and calvarium remain intact.      IMPRESSION:   Mild periventricular white matter ischemia. Age-appropriate atrophy.    Minimal mucosal thickening in RIGHT maxillary sinus.                ROSENDA SIMON MD; Attending Radiologist  This document has been electronically signed. Dec 29 2020 11:24AM    < end of copied text >    CT head personally reviewed - no acute hemorrhage/mass noted  Care Discussed with Consultants/Other Providers:

## 2020-12-29 NOTE — PHYSICAL THERAPY INITIAL EVALUATION ADULT - ADDITIONAL COMMENTS
pt reports she lives alone, 1 step to enter, no steps inside, she is normally independent with ADLs and mobility, occasional use of cane, she reports she has a daughter that lives nearby, pt is concerned to be home alone, reports her daughter was looking into getting her some assistance

## 2020-12-30 ENCOUNTER — TRANSCRIPTION ENCOUNTER (OUTPATIENT)
Age: 80
End: 2020-12-30

## 2020-12-30 DIAGNOSIS — K92.2 GASTROINTESTINAL HEMORRHAGE, UNSPECIFIED: ICD-10-CM

## 2020-12-30 LAB
ALBUMIN SERPL ELPH-MCNC: 2.7 G/DL — LOW (ref 3.3–5)
ALP SERPL-CCNC: 62 U/L — SIGNIFICANT CHANGE UP (ref 40–120)
ALT FLD-CCNC: 23 U/L — SIGNIFICANT CHANGE UP (ref 10–45)
ANION GAP SERPL CALC-SCNC: 8 MMOL/L — SIGNIFICANT CHANGE UP (ref 5–17)
AST SERPL-CCNC: 26 U/L — SIGNIFICANT CHANGE UP (ref 10–40)
BILIRUB SERPL-MCNC: 0.9 MG/DL — SIGNIFICANT CHANGE UP (ref 0.2–1.2)
BUN SERPL-MCNC: 16 MG/DL — SIGNIFICANT CHANGE UP (ref 7–23)
CALCIUM SERPL-MCNC: 8.8 MG/DL — SIGNIFICANT CHANGE UP (ref 8.4–10.5)
CHLORIDE SERPL-SCNC: 105 MMOL/L — SIGNIFICANT CHANGE UP (ref 96–108)
CO2 SERPL-SCNC: 26 MMOL/L — SIGNIFICANT CHANGE UP (ref 22–31)
CREAT SERPL-MCNC: 0.77 MG/DL — SIGNIFICANT CHANGE UP (ref 0.5–1.3)
GLUCOSE SERPL-MCNC: 79 MG/DL — SIGNIFICANT CHANGE UP (ref 70–99)
HCT VFR BLD CALC: 32.3 % — LOW (ref 34.5–45)
HGB BLD-MCNC: 10.9 G/DL — LOW (ref 11.5–15.5)
MAGNESIUM SERPL-MCNC: 1.8 MG/DL — SIGNIFICANT CHANGE UP (ref 1.6–2.6)
MCHC RBC-ENTMCNC: 31.4 PG — SIGNIFICANT CHANGE UP (ref 27–34)
MCHC RBC-ENTMCNC: 33.7 GM/DL — SIGNIFICANT CHANGE UP (ref 32–36)
MCV RBC AUTO: 93.1 FL — SIGNIFICANT CHANGE UP (ref 80–100)
NRBC # BLD: 0 /100 WBCS — SIGNIFICANT CHANGE UP (ref 0–0)
PLATELET # BLD AUTO: 185 K/UL — SIGNIFICANT CHANGE UP (ref 150–400)
POTASSIUM SERPL-MCNC: 4.3 MMOL/L — SIGNIFICANT CHANGE UP (ref 3.5–5.3)
POTASSIUM SERPL-SCNC: 4.3 MMOL/L — SIGNIFICANT CHANGE UP (ref 3.5–5.3)
PROT SERPL-MCNC: 6 G/DL — SIGNIFICANT CHANGE UP (ref 6–8.3)
RBC # BLD: 3.47 M/UL — LOW (ref 3.8–5.2)
RBC # FLD: 12.9 % — SIGNIFICANT CHANGE UP (ref 10.3–14.5)
SODIUM SERPL-SCNC: 139 MMOL/L — SIGNIFICANT CHANGE UP (ref 135–145)
WBC # BLD: 8.63 K/UL — SIGNIFICANT CHANGE UP (ref 3.8–10.5)
WBC # FLD AUTO: 8.63 K/UL — SIGNIFICANT CHANGE UP (ref 3.8–10.5)

## 2020-12-30 PROCEDURE — 93306 TTE W/DOPPLER COMPLETE: CPT | Mod: 26

## 2020-12-30 PROCEDURE — 99233 SBSQ HOSP IP/OBS HIGH 50: CPT

## 2020-12-30 RX ADMIN — LIDOCAINE 1 PATCH: 4 CREAM TOPICAL at 19:44

## 2020-12-30 RX ADMIN — Medication 650 MILLIGRAM(S): at 09:38

## 2020-12-30 RX ADMIN — Medication 1 TABLET(S): at 15:53

## 2020-12-30 RX ADMIN — LIDOCAINE 1 PATCH: 4 CREAM TOPICAL at 09:36

## 2020-12-30 RX ADMIN — LISINOPRIL 40 MILLIGRAM(S): 2.5 TABLET ORAL at 05:28

## 2020-12-30 RX ADMIN — LIDOCAINE 1 PATCH: 4 CREAM TOPICAL at 21:30

## 2020-12-30 RX ADMIN — Medication 650 MILLIGRAM(S): at 10:10

## 2020-12-30 RX ADMIN — AMLODIPINE BESYLATE 5 MILLIGRAM(S): 2.5 TABLET ORAL at 05:28

## 2020-12-30 RX ADMIN — Medication 1 TABLET(S): at 05:28

## 2020-12-30 RX ADMIN — LIDOCAINE 1 PATCH: 4 CREAM TOPICAL at 01:00

## 2020-12-30 NOTE — DISCHARGE NOTE PROVIDER - HOSPITAL COURSE
79 y/o female with HTN, presents with syncope after multiple episodes of diarrhea.  Admitted to hospitalist.  Syncope most likely due GI losses due to viral gastroenteritis.  Syncope work up: CT head unremarkable.  TTE with no acute findings. Trop negative. EKG nonischemic.    Patient stool guiac positive.  Stool cx ________.  CT AP with diverticulosis. GI consulted and recommended __________    Patient with shoulder and elbow pain after fall. L elbow X ray with hairline fracture. Ortho consulted and recommended ________    PT recommended home PT  Patient medically stable for discharge home with outpatient follow up 79 y/o female with HTN, presents with syncope after multiple episodes of diarrhea.  Admitted to hospitalist.  Syncope most likely due GI losses due to viral gastroenteritis.  Syncope work up: CT head unremarkable.  TTE with no acute findings. Trop negative. EKG nonischemic.    Patient stool guiac positive. CT AP with diverticulosis. H&H stable. GI consulted and recommended outpatient follow up.   Patient with shoulder and elbow pain after fall. L elbow X ray hairline fracture lateral epicondyle l elbow. Ortho consulted and recommended sling if ROM or pain worsens, and follow up with Dr. Lagunas in office on  TUESDAY 1/5/21.    PT recommended home PT  Patient medically stable for discharge home with outpatient follow up     PMD: Dr. Godwin - notified 81 y/o female with HTN, presents with syncope after multiple episodes of diarrhea.  Admitted to hospitalist.  Syncope most likely due GI losses due to viral gastroenteritis.  Syncope work up: CT head unremarkable.  TTE with no acute findings. Trop negative. EKG nonischemic.    Patient stool guiac positive. CT AP with diverticulosis. H&H stable. GI consulted and recommended outpatient follow up.   Patient with shoulder and elbow pain after fall. L elbow X ray hairline fracture lateral epicondyle l elbow. Ortho consulted and recommended sling if ROM or pain worsens, and follow up with Dr. Lagunas in office on  TUESDAY 1/5/21.    PT recommended home PT  Patient medically stable for discharge home with outpatient follow up     PMD: Dr. Godwin - notified     Time Spent: 45 minutes

## 2020-12-30 NOTE — DISCHARGE NOTE PROVIDER - CARE PROVIDER_API CALL
Skip Godwin (DO)  Internal Medicine  [address] [place] [address]  Phone: [phone]  Fax: [phone]  Follow Up Time:     CAROLINA SEAY  GASTROENTEROLOGY  [address] [address]  Phone: [phone]  Fax: [phone]  Follow Up Time:     Zafar Lagunas  ORTHOPAEDIC SURGERY  [address] [facility] [address]  [place] [address]  Phone: [phone]  Fax: [phone]  Follow Up Time:

## 2020-12-30 NOTE — DISCHARGE NOTE PROVIDER - NSDCMRMEDTOKEN_GEN_ALL_CORE_FT
amLODIPine 5 mg oral tablet: orally 2 times a day  calcitonin 200 intl units/inh nasal spray: 1 spray(s) nasal once a day  Caltrate 600 + D 600 mg-800 intl units oral tablet: 1 tab(s) orally 2 times a day  hydroCHLOROthiazide 12.5 mg oral capsule: 1 cap(s) orally once a day  lisinopril 40 mg oral tablet: 1 tab(s) orally once a day   acetaminophen 325 mg oral tablet: 2 tab(s) orally every 6 hours, As needed, Temp greater or equal to 38C (100.4F), Mild Pain (1 - 3)  amLODIPine 5 mg oral tablet: orally 2 times a day  Caltrate 600 + D 600 mg-800 intl units oral tablet: 1 tab(s) orally 2 times a day  hydroCHLOROthiazide 12.5 mg oral capsule: 1 cap(s) orally once a day  lisinopril 40 mg oral tablet: 1 tab(s) orally once a day

## 2020-12-30 NOTE — PROGRESS NOTE ADULT - ASSESSMENT
79 y/o female with HTN, presents with diffuse crampy abdominal pain, nausea, vomiting, diarrhea and generalized weakness, lightheadedness.    *Abdominal Pain:  *Mucousy stools/diarrhea   -Rule out infectious etiology  -CTAP with diverticulosis   -Obtain stool O+P and culture  -Monitor BM--had3 loose BM yesterday  -Hold abx for now as symptoms most likely viral   -Afebrile and leukocytosis resolved    *Guaic positive:  suspect due to diverticulosis  Given mild anemia, obtain GI consult with DR Nelson (last cscope 5 years ago)    *Leukocytosis:  suspect due to dehydration  Resolved  Hold abx for now and continue to monitor    *HypoKalemia:  resolved  due to GI losses    *Hyponatremia  Resolved   s/p IVF  PO diet encouraged  Follow up AM BMP    *Anemia  -Likely due to diverticulosis   -Obtain GI consult     *Syncope:  suspect due to dehydration ?vasovagal etiology  Off IVF  Troponin negative  CT head performed  TTE pending  PT recommended home with home PT   Hold Thiazide for now    *Shoulder Pain:  L should pain  suspect due to fall  Follow up shoulder X rays     *HTN:  Cont Amlodipine, lisinopril  Monitor vitals    Dispo: Home pending further workup. Suspect within 24 hours.  Case d/w son Дмитрий and daughter in law, [place]    Discussed with DR Nelson   79 y/o female with HTN, presents with diffuse crampy abdominal pain, nausea, vomiting, diarrhea and generalized weakness, lightheadedness.    *Abdominal Pain:  *Mucousy stools/diarrhea   -Rule out infectious etiology  -CTAP with diverticulosis   -Obtain stool O+P and culture  -Monitor BM--had3 loose BM yesterday  -Hold abx for now as symptoms most likely viral   -Afebrile and leukocytosis resolved    *Guaic positive:  suspect due to diverticulosis  Given mild anemia, obtain GI consult with DR Nelson (last cscope 5 years ago)    *Leukocytosis:  suspect due to dehydration  Resolved  Hold abx for now and continue to monitor    *HypoKalemia:  resolved  due to GI losses    *Hyponatremia  Resolved   s/p IVF  PO diet encouraged  Follow up AM BMP    *Anemia. Acute blood loss   -Likely due to diverticulosis   -Obtain GI consult     *Syncope:  suspect due to dehydration ?vasovagal etiology  Off IVF  Troponin negative  CT head performed  TTE pending  PT recommended home with home PT   Hold Thiazide for now    *Shoulder Pain:  L should pain  suspect due to fall  Follow up shoulder X rays     *HTN:  Cont Amlodipine, lisinopril  Monitor vitals    Dispo: Home pending further workup. Suspect within 24 hours.  Case d/w son Дмитрий and daughter in law, [place]    Discussed with DR Nelson

## 2020-12-30 NOTE — CONSULT NOTE ADULT - PROBLEM SELECTOR RECOMMENDATION 9
? diverticular bleed vs hemorrhoids  Monitor stool  Monitor H/H  May d/c home with outpatient GI follow up

## 2020-12-30 NOTE — DISCHARGE NOTE PROVIDER - PROVIDER TOKENS
PROVIDER:[TOKEN:[[id number]]],PROVIDER:[TOKEN:[[id number]]],PROVIDER:[TOKEN:[[id number]]]

## 2020-12-30 NOTE — PROGRESS NOTE ADULT - SUBJECTIVE AND OBJECTIVE BOX
Patient is a 80y old  Female who presents with a chief complaint of abdominal pain, diarrhea, vomiting, generalized weakness (29 Dec 2020 14:28). No acute issues overnight. 3 loose BMs yesterday       Patient seen and examined at bedside.    ALLERGIES:  No Known Allergies    MEDICATIONS  (STANDING):  amLODIPine   Tablet 5 milliGRAM(s) Oral daily  calcium carbonate 1250 mG  + Vitamin D (OsCal 500 + D) 1 Tablet(s) Oral two times a day  lidocaine   Patch 1 Patch Transdermal daily  lisinopril 40 milliGRAM(s) Oral daily    MEDICATIONS  (PRN):  acetaminophen   Tablet .. 650 milliGRAM(s) Oral every 6 hours PRN Temp greater or equal to 38C (100.4F), Mild Pain (1 - 3)  ondansetron Injectable 4 milliGRAM(s) IV Push every 6 hours PRN Nausea and/or Vomiting    Vital Signs Last 24 Hrs  T(F): 98 (30 Dec 2020 07:53), Max: 99.5 (29 Dec 2020 16:13)  HR: 71 (30 Dec 2020 07:53) (64 - 80)  BP: 131/66 (30 Dec 2020 07:53) (119/56 - 147/62)  RR: 16 (30 Dec 2020 07:53) (15 - 19)  SpO2: 96% (30 Dec 2020 07:53) (95% - 97%)  I&O's Summary    29 Dec 2020 07:  -  30 Dec 2020 07:00  --------------------------------------------------------  IN: 640 mL / OUT: 0 mL / NET: 640 mL    30 Dec 2020 07:  -  30 Dec 2020 10:08  --------------------------------------------------------  IN: 240 mL / OUT: 0 mL / NET: 240 mL      BMI (kg/m2): 28.7 (12-29-20 @ 01:02)  PHYSICAL EXAM:  General: NAD, A/O x 3  ENT: MMM, no thrush  Neck: Supple, No JVD  Lungs: Non labored breathing,  Clear to auscultation bilaterally,   Cardio: RRR, S1/S2, No murmurs, no pitting edema bilaterally  Abdomen: Soft, Nontender, Nondistended; Bowel sounds present  Extremities: No calf tenderness, moves all extremities    LABS:                        10.9   8.63  )-----------( 185      ( 30 Dec 2020 06:00 )             32.3           139  |  105  |  16  ----------------------------<  79  4.3   |  26  |  0.77    Ca    8.8      30 Dec 2020 06:00  Mg     1.8         TPro  6.0  /  Alb  2.7  /  TBili  0.9  /  DBili  x   /  AST  26  /  ALT  23  /  AlkPhos  62       eGFR if Non African American: 73 mL/min/1.73M2 (20 @ 06:00)  eGFR if : 84 mL/min/1.73M2 (20 @ 06:00)       Lactate, Blood: 1.5 mmol/L ( @ 21:30)    CARDIAC MARKERS ( 28 Dec 2020 17:20 )  <.017 ng/mL / x     / x     / x     / x                            Urinalysis Basic - ( 28 Dec 2020 19:25 )    Color: Yellow / Appearance: Clear / S.010 / pH: x  Gluc: x / Ketone: Negative  / Bili: Negative / Urobili: Negative   Blood: x / Protein: Negative / Nitrite: Negative   Leuk Esterase: Moderate / RBC: 0-4 /HPF / WBC 11-25 /HPF   Sq Epi: x / Non Sq Epi: Neg.-Few / Bacteria: Trace /HPF        Culture - Blood (collected 28 Dec 2020 21:30)  Source: .Blood Blood-Venous  Preliminary Report (30 Dec 2020 05:01):    No growth to date.    Culture - Blood (collected 28 Dec 2020 21:30)  Source: .Blood Blood-Peripheral  Preliminary Report (30 Dec 2020 05:01):    No growth to date.        RADIOLOGY & ADDITIONAL TESTS:    Care Discussed with Consultants/Other Providers:    Patient is a 80y old  Female who presents with a chief complaint of abdominal pain, diarrhea, vomiting, generalized weakness (29 Dec 2020 14:28). No acute issues overnight. 3 loose BMs yesterday       Patient seen and examined at bedside.    ALLERGIES:  No Known Allergies    MEDICATIONS  (STANDING):  amLODIPine   Tablet 5 milliGRAM(s) Oral daily  calcium carbonate 1250 mG  + Vitamin D (OsCal 500 + D) 1 Tablet(s) Oral two times a day  lidocaine   Patch 1 Patch Transdermal daily  lisinopril 40 milliGRAM(s) Oral daily    MEDICATIONS  (PRN):  acetaminophen   Tablet .. 650 milliGRAM(s) Oral every 6 hours PRN Temp greater or equal to 38C (100.4F), Mild Pain (1 - 3)  ondansetron Injectable 4 milliGRAM(s) IV Push every 6 hours PRN Nausea and/or Vomiting    Vital Signs Last 24 Hrs  T(F): 98 (30 Dec 2020 07:53), Max: 99.5 (29 Dec 2020 16:13)  HR: 71 (30 Dec 2020 07:53) (64 - 80)  BP: 131/66 (30 Dec 2020 07:53) (119/56 - 147/62)  RR: 16 (30 Dec 2020 07:53) (15 - 19)  SpO2: 96% (30 Dec 2020 07:53) (95% - 97%)  I&O's Summary    29 Dec 2020 07:  -  30 Dec 2020 07:00  --------------------------------------------------------  IN: 640 mL / OUT: 0 mL / NET: 640 mL    30 Dec 2020 07:  -  30 Dec 2020 10:08  --------------------------------------------------------  IN: 240 mL / OUT: 0 mL / NET: 240 mL      BMI (kg/m2): 28.7 (12-29-20 @ 01:02)  PHYSICAL EXAM:  General: NAD, A/O x 3, elderly  ENT: MMM, no thrush  Neck: Supple, No JVD  Lungs: Non labored breathing,  Clear to auscultation bilaterally,   Cardio: RRR, S1/S2, , no pitting edema bilaterally  Abdomen: Soft, Nontender, Nondistended; Bowel sounds present  Extremities: No calf tenderness, moves all extremities but some discomfort when moving L shoulder     LABS:                        10.9   8.63  )-----------( 185      ( 30 Dec 2020 06:00 )             32.3           139  |  105  |  16  ----------------------------<  79  4.3   |  26  |  0.77    Ca    8.8      30 Dec 2020 06:00  Mg     1.8         TPro  6.0  /  Alb  2.7  /  TBili  0.9  /  DBili  x   /  AST  26  /  ALT  23  /  AlkPhos  62       eGFR if Non African American: 73 mL/min/1.73M2 (20 @ 06:00)  eGFR if : 84 mL/min/1.73M2 (20 @ 06:00)       Lactate, Blood: 1.5 mmol/L ( @ 21:30)    CARDIAC MARKERS ( 28 Dec 2020 17:20 )  <.017 ng/mL / x     / x     / x     / x                            Urinalysis Basic - ( 28 Dec 2020 19:25 )    Color: Yellow / Appearance: Clear / S.010 / pH: x  Gluc: x / Ketone: Negative  / Bili: Negative / Urobili: Negative   Blood: x / Protein: Negative / Nitrite: Negative   Leuk Esterase: Moderate / RBC: 0-4 /HPF / WBC 11-25 /HPF   Sq Epi: x / Non Sq Epi: Neg.-Few / Bacteria: Trace /HPF        Culture - Blood (collected 28 Dec 2020 21:30)  Source: .Blood Blood-Venous  Preliminary Report (30 Dec 2020 05:01):    No growth to date.    Culture - Blood (collected 28 Dec 2020 21:30)  Source: .Blood Blood-Peripheral  Preliminary Report (30 Dec 2020 05:01):    No growth to date.        RADIOLOGY & ADDITIONAL TESTS:    Care Discussed with Consultants/Other Providers:    Patient is a 80y old  Female who presents with a chief complaint of abdominal pain, diarrhea, vomiting, generalized weakness (29 Dec 2020 14:28). No acute issues overnight. 3 loose BMs yesterday       Patient seen and examined at bedside.    ALLERGIES:  No Known Allergies    MEDICATIONS  (STANDING):  amLODIPine   Tablet 5 milliGRAM(s) Oral daily  calcium carbonate 1250 mG  + Vitamin D (OsCal 500 + D) 1 Tablet(s) Oral two times a day  lidocaine   Patch 1 Patch Transdermal daily  lisinopril 40 milliGRAM(s) Oral daily    MEDICATIONS  (PRN):  acetaminophen   Tablet .. 650 milliGRAM(s) Oral every 6 hours PRN Temp greater or equal to 38C (100.4F), Mild Pain (1 - 3)  ondansetron Injectable 4 milliGRAM(s) IV Push every 6 hours PRN Nausea and/or Vomiting    Vital Signs Last 24 Hrs  T(F): 98 (30 Dec 2020 07:53), Max: 99.5 (29 Dec 2020 16:13)  HR: 71 (30 Dec 2020 07:53) (64 - 80)  BP: 131/66 (30 Dec 2020 07:53) (119/56 - 147/62)  RR: 16 (30 Dec 2020 07:53) (15 - 19)  SpO2: 96% (30 Dec 2020 07:53) (95% - 97%)  I&O's Summary    29 Dec 2020 07:  -  30 Dec 2020 07:00  --------------------------------------------------------  IN: 640 mL / OUT: 0 mL / NET: 640 mL    30 Dec 2020 07:  -  30 Dec 2020 10:08  --------------------------------------------------------  IN: 240 mL / OUT: 0 mL / NET: 240 mL      BMI (kg/m2): 28.7 (12-29-20 @ 01:02)  PHYSICAL EXAM:  General: NAD, A/O x 3, elderly  ENT: MMM, no thrush  Neck: Supple, No JVD  Lungs: Non labored breathing,  Clear to auscultation bilaterally,   Cardio: RRR, S1/S2, , no pitting edema bilaterally  Abdomen: Soft, Nontender, Nondistended; Bowel sounds present  Extremities: No calf tenderness, moves all extremities but some discomfort when moving L shoulder     LABS:                        10.9   8.63  )-----------( 185      ( 30 Dec 2020 06:00 )             32.3           139  |  105  |  16  ----------------------------<  79  4.3   |  26  |  0.77    Ca    8.8      30 Dec 2020 06:00  Mg     1.8         TPro  6.0  /  Alb  2.7  /  TBili  0.9  /  DBili  x   /  AST  26  /  ALT  23  /  AlkPhos  62       eGFR if Non African American: 73 mL/min/1.73M2 (20 @ 06:00)  eGFR if : 84 mL/min/1.73M2 (20 @ 06:00)       Lactate, Blood: 1.5 mmol/L ( @ 21:30)    CARDIAC MARKERS ( 28 Dec 2020 17:20 )  <.017 ng/mL / x     / x     / x     / x                            Urinalysis Basic - ( 28 Dec 2020 19:25 )    Color: Yellow / Appearance: Clear / S.010 / pH: x  Gluc: x / Ketone: Negative  / Bili: Negative / Urobili: Negative   Blood: x / Protein: Negative / Nitrite: Negative   Leuk Esterase: Moderate / RBC: 0-4 /HPF / WBC 11-25 /HPF   Sq Epi: x / Non Sq Epi: Neg.-Few / Bacteria: Trace /HPF        Culture - Blood (collected 28 Dec 2020 21:30)  Source: .Blood Blood-Venous  Preliminary Report (30 Dec 2020 05:01):    No growth to date.    Culture - Blood (collected 28 Dec 2020 21:30)  Source: .Blood Blood-Peripheral  Preliminary Report (30 Dec 2020 05:01):    No growth to date.        RADIOLOGY & ADDITIONAL TESTS:  < from: TTE Echo Complete w/o Contrast w/ Doppler (20 @ 10:16) >    EXAM:  ECHO TTE WO CON COMP W DOPP      PROCEDURE DATE:  2020        INTERPRETATION:  REPORT:  TRANSTHORACIC ECHOCARDIOGRAM REPORT        Patient Name:   KD CONNELLY Patient Location: [address] Rec #:  [id number]          Accession #:      [id number]  Account #:      [id number]          Height:           60.0 in 152.4 cm  YOB: 1940         Weight:           147.0 lb 66.70 kg  Patient Age:    80 years         BSA:              1.64 m²  Patient Gender: F                BP:               120/50 mmHg      Date of Exam:        2020 10:16:01 AM  Sonographer:         Alfonso Aguilar  Referring Physician: MAJOR DESAI    Procedure:     2D Echo/Doppler/Color Doppler Complete.  Indications:   Syncope and collapse - R55  Diagnosis:     Syncope and collapse - R55  Study Details: Technically fair study.        2D AND M-MODE MEASUREMENTS (normal ranges within parentheses):  Left Ventricle:                  Normal   Aorta/Left Atrium:             Normal  IVSd (2D):         1.12 cm (0.7-1.1) Aortic Root (2D):    3.02 cm (2.4-3.7)  LVPWd (2D):             1.01 cm (0.7-1.1) Aortic Root (Mmode): 2.95 cm (2.4-3.7)  LVIDd (2D):             4.37 cm (3.4-5.7) AoV Cusp Separation: 1.85 cm (1.5-2.6)  LVIDs (2D):    2.60 cm           Left Atrium (2D):    3.26 cm (1.9-4.0)  LV FS (2D):             40.5 %   (>25%)   Left Atrium (Mmode): 3.80 cm (1.9-4.0)  LV EF (2D):              71 %    (>55%)   Right Ventricle:  Relative Wall Thickness  0.46    (<0.42)  TAPSE:           2.03 cm    LV SYSTOLIC FUNCTION BY 2D PLANIMETRY (MOD):  EF-A4C View: 63.8 % EF-A2C View: 73.3 % EF-Biplane: 68 %    LV DIASTOLIC FUNCTION:  MV Peak E: 0.94 m/s Decel Time: 240 msec  MV Peak A: 1.01 m/s  E/A Ratio: 0.93    SPECTRAL DOPPLER ANALYSIS (where applicable):  Mitral Valve:  MV P1/2 Time: 69.70 msec  MV Area, PHT: 3.16 cm²    Aortic Valve: AoV Max Johnnie: 1.50 m/s AoV Peak P.0 mmHg AoV Mean P.7 mmHg    LVOT Vmax: 1.11 m/s LVOT VTI: 0.218 m LVOT Diameter: 1.94 cm    AoV Area, Vmax: 2.19 cm² AoV Area, VTI: 2.07 cm² AoV Area, Vmn: 1.90 cm²  Ao VTI: 0.310  Aortic Insufficiency:  AI Half-time:  437 msec  AI Decel Rate: 1.88 m/s²    Tricuspid Valve and PA/RV Systolic Pressure: TR Max Velocity: 2.64 m/s RA Pressure: 10 mmHgRVSP/PASP: 37.9 mmHg      PHYSICIAN INTERPRETATION:  Left Ventricle: The left ventricular internal cavity size is normal. Left ventricular wall thickness is normal.  Global LV systolic function was normal. Left ventricular ejection fraction, by visual estimation, is 65 to 70%. Spectral Doppler shows impaired relaxation pattern of left ventricular myocardial filling (Grade I diastolic dysfunction).  Right Ventricle: Normal right ventricular size and function.  Left Atrium: The left atrium is normal in size.  Right Atrium: The right atrium is normal in size.  Pericardium: There is a significant pericardial fat pad present.  Mitral Valve: Thickening and calcification of the anterior and posterior mitral valve leaflets. Mitral leaflet mobility isnormal. There is mild mitral annular calcification. Trace mitral valve regurgitation is seen.  Tricuspid Valve: The tricuspid valve is normal in structure. Mild tricuspid regurgitation is visualized. Estimated pulmonary artery systolic pressure is 37.9 mmHg assuming a right atrial pressure of 10 mmHg, which is consistent with borderline pulmonary hypertension.  Aortic Valve: The aortic valve is trileaflet. Sclerotic aortic valve with normal opening. Mild aortic valve regurgitation is seen.  Pulmonic Valve: The pulmonic valve is normal. No indication of pulmonic valve regurgitation.  Aorta: The aortic root and ascending aorta are structurally normal, with no evidence of dilitation. The aortic arch was not well visualized. Aortic root measured at Sinus of Valsalva is normal.  Pulmonary Artery: The pulmonary artery is of normal size and origin.  Venous: The inferior vena cava is normal. The inferior vena cava was normal sized, with respiratory size variation greater than 50%.      Summary:  1. Left ventricular ejection fraction, by visual estimation, is 65 to 70%.   2. Normal global left ventricular systolic function.   3. Normal left ventricular internal cavity size.   4. Spectral Doppler shows impaired relaxation pattern of left ventricular myocardial filling (Grade I diastolic dysfunction).   5. Mild mitral annular calcification.   6. Thickening and calcification of the anterior and posterior mitral valve leaflets.   7. Trace mitral valve regurgitation.   8. Mild tricuspid regurgitation.   9. Mild aortic regurgitation.  10. Sclerotic aortic valve with normal opening.  11. Estimated pulmonary artery systolic pressure is 37.9 mmHg assuming a right atrial pressure of 10 mmHg, which is consistent with borderline pulmonary hypertension.    [id number] Flako Morris MD,[facility] , Electronically signed on 2020 at 12:39:30 PM            *** Final ***                FLAKO MORRIS M.D., ATTENDING CARDIOLOGIST  This document has been electronically signed. Dec 30 2020 10:16AM    < end of copied text >      Care Discussed with Consultants/Other Providers: Discussed with GIDr. carbajal

## 2020-12-30 NOTE — DISCHARGE NOTE PROVIDER - NSDCCPCAREPLAN_GEN_ALL_CORE_FT
PRINCIPAL DISCHARGE DIAGNOSIS  Diagnosis: Gastroenteritis  Assessment and Plan of Treatment: - You came to the hospital for dizziness and diarrhea. Your symptoms improved with IV fluids and the diarrhea has resolved. Your CT scan showed diverticulosis.   - Follow up with Dr. Nelson (gastroenterologist).   - Drink plenty of fluids.  - Follow up with Dr. Godwin next week.      SECONDARY DISCHARGE DIAGNOSES  Diagnosis: Left elbow fracture  Assessment and Plan of Treatment: - You sustained a hairline fracture to your left elbow when you fell.   - Take tylenol as needed for pain.  - If you have worsening pain, wear a sling on left arm which will help to minimize movement.   - Follow up with Dr. Lagunas (orthopedist) on 1/5/2020.

## 2020-12-31 ENCOUNTER — TRANSCRIPTION ENCOUNTER (OUTPATIENT)
Age: 80
End: 2020-12-31

## 2020-12-31 VITALS — SYSTOLIC BLOOD PRESSURE: 148 MMHG | DIASTOLIC BLOOD PRESSURE: 60 MMHG

## 2020-12-31 LAB
ANION GAP SERPL CALC-SCNC: 9 MMOL/L — SIGNIFICANT CHANGE UP (ref 5–17)
BUN SERPL-MCNC: 22 MG/DL — SIGNIFICANT CHANGE UP (ref 7–23)
CALCIUM SERPL-MCNC: 8.9 MG/DL — SIGNIFICANT CHANGE UP (ref 8.4–10.5)
CHLORIDE SERPL-SCNC: 103 MMOL/L — SIGNIFICANT CHANGE UP (ref 96–108)
CO2 SERPL-SCNC: 27 MMOL/L — SIGNIFICANT CHANGE UP (ref 22–31)
CREAT SERPL-MCNC: 0.78 MG/DL — SIGNIFICANT CHANGE UP (ref 0.5–1.3)
GLUCOSE SERPL-MCNC: 78 MG/DL — SIGNIFICANT CHANGE UP (ref 70–99)
HCT VFR BLD CALC: 34.2 % — LOW (ref 34.5–45)
HGB BLD-MCNC: 11.8 G/DL — SIGNIFICANT CHANGE UP (ref 11.5–15.5)
MCHC RBC-ENTMCNC: 32 PG — SIGNIFICANT CHANGE UP (ref 27–34)
MCHC RBC-ENTMCNC: 34.5 GM/DL — SIGNIFICANT CHANGE UP (ref 32–36)
MCV RBC AUTO: 92.7 FL — SIGNIFICANT CHANGE UP (ref 80–100)
NRBC # BLD: 0 /100 WBCS — SIGNIFICANT CHANGE UP (ref 0–0)
PLATELET # BLD AUTO: 194 K/UL — SIGNIFICANT CHANGE UP (ref 150–400)
POTASSIUM SERPL-MCNC: 3.9 MMOL/L — SIGNIFICANT CHANGE UP (ref 3.5–5.3)
POTASSIUM SERPL-SCNC: 3.9 MMOL/L — SIGNIFICANT CHANGE UP (ref 3.5–5.3)
RBC # BLD: 3.69 M/UL — LOW (ref 3.8–5.2)
RBC # FLD: 12.8 % — SIGNIFICANT CHANGE UP (ref 10.3–14.5)
SODIUM SERPL-SCNC: 139 MMOL/L — SIGNIFICANT CHANGE UP (ref 135–145)
WBC # BLD: 7.99 K/UL — SIGNIFICANT CHANGE UP (ref 3.8–10.5)
WBC # FLD AUTO: 7.99 K/UL — SIGNIFICANT CHANGE UP (ref 3.8–10.5)

## 2020-12-31 PROCEDURE — 99239 HOSP IP/OBS DSCHRG MGMT >30: CPT

## 2020-12-31 PROCEDURE — 99222 1ST HOSP IP/OBS MODERATE 55: CPT

## 2020-12-31 RX ORDER — ACETAMINOPHEN 500 MG
2 TABLET ORAL
Qty: 0 | Refills: 0 | DISCHARGE
Start: 2020-12-31

## 2020-12-31 RX ADMIN — LISINOPRIL 40 MILLIGRAM(S): 2.5 TABLET ORAL at 05:40

## 2020-12-31 RX ADMIN — Medication 650 MILLIGRAM(S): at 01:37

## 2020-12-31 RX ADMIN — LIDOCAINE 1 PATCH: 4 CREAM TOPICAL at 12:27

## 2020-12-31 RX ADMIN — AMLODIPINE BESYLATE 5 MILLIGRAM(S): 2.5 TABLET ORAL at 05:40

## 2020-12-31 RX ADMIN — Medication 1 TABLET(S): at 05:41

## 2020-12-31 RX ADMIN — Medication 650 MILLIGRAM(S): at 00:37

## 2020-12-31 NOTE — PROGRESS NOTE ADULT - SUBJECTIVE AND OBJECTIVE BOX
Patient is a 80y old  Female who presents with a chief complaint of abdominal pain, diarrhea, vomiting, generalized weakness (31 Dec 2020 11:07)    Patient seen and examined at bedside. Pt c/o left shoulder pain.     ALLERGIES:  No Known Allergies    MEDICATIONS  (STANDING):  amLODIPine   Tablet 5 milliGRAM(s) Oral daily  calcium carbonate 1250 mG  + Vitamin D (OsCal 500 + D) 1 Tablet(s) Oral two times a day  lidocaine   Patch 1 Patch Transdermal daily  lisinopril 40 milliGRAM(s) Oral daily    MEDICATIONS  (PRN):  acetaminophen   Tablet .. 650 milliGRAM(s) Oral every 6 hours PRN Temp greater or equal to 38C (100.4F), Mild Pain (1 - 3)  ondansetron Injectable 4 milliGRAM(s) IV Push every 6 hours PRN Nausea and/or Vomiting    Vital Signs Last 24 Hrs  T(F): 98 (31 Dec 2020 08:36), Max: 98.5 (31 Dec 2020 00:16)  HR: 65 (31 Dec 2020 08:36) (65 - 78)  BP: 145/64 (31 Dec 2020 08:36) (125/52 - 145/64)  RR: 16 (31 Dec 2020 08:36) (14 - 16)  SpO2: 95% (31 Dec 2020 08:36) (95% - 99%)    I&O's Summary  30 Dec 2020 07:  -  31 Dec 2020 07:00  --------------------------------------------------------  IN: 240 mL / OUT: 0 mL / NET: 240 mL    31 Dec 2020 07:  -  31 Dec 2020 12:32  --------------------------------------------------------  IN: 240 mL / OUT: 0 mL / NET: 240 mL    PHYSICAL EXAM:  General: NAD, A/O x 3  ENT: MMM  Neck: Supple, No JVD  Lungs: Clear to auscultation bilaterally  Cardio: RRR, S1/S2, No murmurs  Abdomen: Soft, Nontender, Nondistended; Bowel sounds present  Extremities: No calf tenderness, No pitting edema. left elbow bruise. Decreased ROM left shoulder 2/2 pain. Full ROM left elbow    LABS:                        11.8   7.99  )-----------( 194      ( 31 Dec 2020 06:00 )             34.2         139  |  103  |  22  ----------------------------<  78  3.9   |  27  |  0.78    Ca    8.9      31 Dec 2020 06:00  Mg     1.8         TPro  6.0  /  Alb  2.7  /  TBili  0.9  /  DBili  x   /  AST  26  /  ALT  23  /  AlkPhos  62      eGFR if Non African American: 71 mL/min/1.73M2 (20 @ 06:00)  eGFR if : 82 mL/min/1.73M2 (20 @ 06:00)      Lactate, Blood: 1.5 mmol/L ( @ 21:30)    CARDIAC MARKERS ( 28 Dec 2020 17:20 )  <.017 ng/mL / x     / x     / x     / x        Urinalysis Basic - ( 28 Dec 2020 19:25 )    Color: Yellow / Appearance: Clear / S.010 / pH: x  Gluc: x / Ketone: Negative  / Bili: Negative / Urobili: Negative   Blood: x / Protein: Negative / Nitrite: Negative   Leuk Esterase: Moderate / RBC: 0-4 /HPF / WBC 11-25 /HPF   Sq Epi: x / Non Sq Epi: Neg.-Few / Bacteria: Trace /HPF    Culture - Blood (collected 28 Dec 2020 21:30)  Source: .Blood Blood-Venous  Preliminary Report (30 Dec 2020 05:01):    No growth to date.    Culture - Blood (collected 28 Dec 2020 21:30)  Source: .Blood Blood-Peripheral  Preliminary Report (30 Dec 2020 05:01):    No growth to date.    RADIOLOGY & ADDITIONAL TESTS:    < from: CT Head No Cont (20 @ 10:58) >    IMPRESSION:   Mild periventricular white matter ischemia. Age-appropriate atrophy.    Minimal mucosal thickening in RIGHT maxillary sinus.    < end of copied text >      < from: TTE Echo Complete w/o Contrast w/ Doppler (12.30.20 @ 10:16) >    Summary:  1. Left ventricular ejection fraction, by visual estimation, is 65 to 70%.   2. Normal global left ventricular systolic function.   3. Normal left ventricular internal cavity size.   4. Spectral Doppler shows impaired relaxation pattern of left ventricular myocardial filling (Grade I diastolic dysfunction).   5. Mild mitral annular calcification.   6. Thickening and calcification of the anterior and posterior mitral valve leaflets.   7. Trace mitral valve regurgitation.   8. Mild tricuspid regurgitation.   9. Mild aortic regurgitation.  10. Sclerotic aortic valve with normal opening.  11. Estimated pulmonary artery systolic pressure is 37.9 mmHg assuming a right atrial pressure of 10 mmHg, which is consistent with borderline pulmonary hypertension.    < end of copied text >      Care Discussed with Consultants/Other Providers:

## 2020-12-31 NOTE — PROGRESS NOTE ADULT - ATTENDING COMMENTS
I have personally seen and examined patient on the above date.  I discussed the case with Poonam No NP and I agree with findings and plan as detailed per note above, which I have amended where appropriate.
I have personally seen and examined patient on the above date.  I discussed the case with Billie EDEN and I agree with findings and plan as detailed per note above, which I have amended where appropriate.
I have personally seen and examined patient on the above date.  I discussed the case with Billie EDEN and I agree with findings and plan as detailed per note above, which I have amended where appropriate.

## 2020-12-31 NOTE — CONSULT NOTE ADULT - SUBJECTIVE AND OBJECTIVE BOX
INTERVAL HPI/OVERNIGHT EVENTS:  HPI:  79 y/o female pmh HTN, CESARIO, Hernia repair who is admitted with diarrhea. Patient states she started having abdominal pain on  and then it got worse on Monday. She noted multiple episodes of watery diarrhea. Denies fever/chills. Denies eating out or ordering food. No recent travel or sick contacts. Last meal eaten prior to symptoms was eggs and ricotta cheese. She notes some blood mixed with stool since arriving to the hospital. Her last colonoscopy was 4 years ago, but does not recall with who or findings. No weight loss or loss of appetite. She did have one episode of vomiting during this admission.       MEDICATIONS  (STANDING):  amLODIPine   Tablet 5 milliGRAM(s) Oral daily  calcium carbonate 1250 mG  + Vitamin D (OsCal 500 + D) 1 Tablet(s) Oral two times a day  lidocaine   Patch 1 Patch Transdermal daily  lisinopril 40 milliGRAM(s) Oral daily    MEDICATIONS  (PRN):  acetaminophen   Tablet .. 650 milliGRAM(s) Oral every 6 hours PRN Temp greater or equal to 38C (100.4F), Mild Pain (1 - 3)  ondansetron Injectable 4 milliGRAM(s) IV Push every 6 hours PRN Nausea and/or Vomiting      Allergies    No Known Allergies    Intolerances        PAST MEDICAL & SURGICAL HISTORY:  Osteoporosis    Osteoarthritis    HTN (hypertension)    S/P D&amp;C (status post dilation and curettage)  x3    S/P CESARIO (total abdominal hysterectomy)    S/P hernia repair          REVIEW OF SYSTEMS: negative unless otherwise indicated in HPI    Non smoker  No ETOH    PHYSICAL EXAM:   Vital Signs:  Vital Signs Last 24 Hrs  T(C): 36.9 (30 Dec 2020 15:58), Max: 37.3 (29 Dec 2020 20:52)  T(F): 98.4 (30 Dec 2020 15:58), Max: 99.1 (29 Dec 2020 20:52)  HR: 69 (30 Dec 2020 15:58) (64 - 75)  BP: 125/52 (30 Dec 2020 15:58) (119/56 - 138/64)  BP(mean): --  RR: 16 (30 Dec 2020 15:58) (16 - 19)  SpO2: 96% (30 Dec 2020 15:58) (95% - 97%)  Daily     Daily Weight in k.9 (30 Dec 2020 05:24)I&O's Summary    29 Dec 2020 07:01  -  30 Dec 2020 07:00  --------------------------------------------------------  IN: 640 mL / OUT: 0 mL / NET: 640 mL    30 Dec 2020 07:01  -  30 Dec 2020 16:46  --------------------------------------------------------  IN: 240 mL / OUT: 0 mL / NET: 240 mL        GENERAL:  Appears stated age,   HEENT:  NC/AT,  conjunctivae clear and pink,  CHEST:  Full & symmetric excursion, no increased effort, breath sounds clear  HEART:  Regular rhythm, S1, S2, no murmur  ABDOMEN:  Soft, non-tender, non-distended, normoactive bowel sounds,   EXTEREMITIES:  no  edema  SKIN:  No rash/warm/dry  NEURO:  Alert, oriented,       LABS:                        10.9   8.63  )-----------( 185      ( 30 Dec 2020 06:00 )             32.3     12-30    139  |  105  |  16  ----------------------------<  79  4.3   |  26  |  0.77    Ca    8.8      30 Dec 2020 06:00  Mg     1.8         TPro  6.0  /  Alb  2.7<L>  /  TBili  0.9  /  DBili  x   /  AST  26  /  ALT  23  /  AlkPhos  62  1230      Urinalysis Basic - ( 28 Dec 2020 19:25 )    Color: Yellow / Appearance: Clear / S.010 / pH: x  Gluc: x / Ketone: Negative  / Bili: Negative / Urobili: Negative   Blood: x / Protein: Negative / Nitrite: Negative   Leuk Esterase: Moderate / RBC: 0-4 /HPF / WBC 11-25 /HPF   Sq Epi: x / Non Sq Epi: Neg.-Few / Bacteria: Trace /HPF    Lipase, Serum: 168 U/L ( @ 17:20)    RADIOLOGY & ADDITIONAL TESTS:  
S:  81 y/o female with HTN, BIBA to the ED because of diffuse crampy abdominal pain, nausea, vomiting and diarrhea that started after lunch.  Pt states she had several bouts of watery diarrhea, crampy abdominal pain, she was feeling so weak and lightheaded.  After having loose BMs, she fell to the floor, because she was dizzy, lightheaded, felt so weak in her legs, but no LOC.  She lives alone, and she called EMS.  While in ED, she had 2 more episodes of loose BM, with some flecks of mucoid blood, and also had an episode of vomiting.  Pt denies sick contacts, no history of travel, no change in diet, no recent antibx use. Denies fever, chills, cough, chest pain, dysuria.  WBC noted to be 20,000 with left shift. Abd CT with diverticulosis but no diverticulitis.  She received a dose of Cipro IV and Flagyl IV in the ED. (28 Dec 2020 23:05)    During workup exam pt. was noted to have left shoulder pain with painful arc. Pt. is s/p suspected fall.  Xrays were taken of L elbow and shoulder revealing  hairline fracture of Left lateral epicondyle of humerus.  Dr. Lagunas was consulted.    O:  LUE : Limited range  of motion of left shoulder on abduction past 90 degrees secondary to pain and contracture. EXT rot to 45 degrees.  FROM supination /pronation of forearm. Full ROM of Left elbow.  + radial/ulnar pulses gd cap refill.  sensory intact.    < from: Xray Shoulder 2 Views, Left (12.29.20 @ 18:49) >  EXAM:  SHOULDER LEFT (MINIMUM 2 VIEW)      PROCEDURE DATE:  12/29/2020        INTERPRETATION:  Left shoulder    HISTORY: Pain after falling     Two views of the left shoulder show no evidence of fracture nor destructive change. The joint spaces show mild prominence of the AC joint without step off deformity.    IMPRESSION: No acute bony pathology.    Note - This does not exclude fractures in perfect alignment and apposition as presence may be indicated at one to three weeks following callus formation.      Thank you for this referral.              BRADY TOVAR MD; Attending Interventional Radiologist  This document has been electronically signed. Dec 30 2020 11:40AM    < end of copied text >  < from: Xray Elbow AP + Lateral + Oblique, Left (12.29.20 @ 18:48) >    EXAM:  ELBOW LEFT (3 VIEWS)      PROCEDURE DATE:  12/29/2020        INTERPRETATION:  Left elbow    HISTORY: Pain after falling     Three views of the left elbow show questionable hairline fracture through the lateral epicondyle of the humerus in one view. The joint spaces are maintained.    IMPRESSION: Questionable hairline fracture. Clinical correlation is suggested regarding pain at that location.    Thank you for this referral.              BRADY TOVAR MD; Attending Interventional Radiologist  This document has been electronically signed. Dec 30 2020 11:43AM    < end of copied text >

## 2020-12-31 NOTE — PROGRESS NOTE ADULT - REASON FOR ADMISSION
abdominal pain, diarrhea, vomiting, generalized weakness

## 2020-12-31 NOTE — DISCHARGE NOTE NURSING/CASE MANAGEMENT/SOCIAL WORK - NSDCFUADDAPPT_GEN_ALL_CORE_FT
Please make a hospital follow-up appointment with Dr. Godwin [phone] the office was not open when we attempted to schedule it for you.  [facility] [phone], [address] [place] [address] for shower chair.

## 2020-12-31 NOTE — DISCHARGE NOTE NURSING/CASE MANAGEMENT/SOCIAL WORK - PATIENT PORTAL LINK FT
You can access the FollowMyHealth Patient Portal offered by [facility] by registering at the following website: [email or web address]. By joining [employer]’s FollowMyHealth portal, you will also be able to view your health information using other applications (apps) compatible with our system.

## 2020-12-31 NOTE — CONSULT NOTE ADULT - ASSESSMENT
A:  -Hairline fracture lateral epicondyle l elbow  -OA L shoulder    P: sling if ROM or pain worsens  -follow up with Dr. Lagunas in office on  TUESDAY 1/5/21.

## 2020-12-31 NOTE — PROGRESS NOTE ADULT - ASSESSMENT
79 y/o female with HTN, presents with diffuse crampy abdominal pain, nausea, vomiting, diarrhea and generalized weakness, lightheadedness.    *Abdominal Pain, diarrhea  - Suspect 2/2 viral gastroenteritis  - Diarrhea has resolved   - CTAP with diverticulosis   - Afebrile and leukocytosis resolved    *Guaic positive:  - suspect due to diverticulosis  - Appreciate GI recs   - H&H stable    *Leukocytosis - resolved   *HypoKalemia - resolved   *Hyponatremia- resolved     *Anemia. Acute blood loss   - Likely due to diverticulosis   - Appreciate GI recs   - H&H stable     *Syncope:  - suspect due to dehydration ?vasovagal etiology  - Troponin negative  - CT head reviewed  - TTE reviewed   - PT recommended home with home PT     #Hairline fracture lateral epicondyle l elbow  #OA L shoulder  - sling if ROM or pain worsens  - follow up with Dr. Lagunas in office on  TUESDAY 1/5/21.    *HTN:  - Cont Amlodipine, lisinopril. Will resume HCTZ  - Monitor vitals    Dispo: Home with home PT today    12/31: Updated son Justyn and daughter-in-law [place]   79 y/o female with HTN, presents with diffuse crampy abdominal pain, nausea, vomiting, diarrhea and generalized weakness, lightheadedness.    *Abdominal Pain, diarrhea  - Suspect 2/2 viral gastroenteritis  - Diarrhea has resolved   - CTAP with diverticulosis   - Afebrile and leukocytosis resolved    *Guaic positive:  - suspect due to diverticulosis  - Appreciate GI recs   - H&H stable, improved    *Leukocytosis - resolved   *Hypokalemia - resolved   *Hyponatremia- resolved     *Anemia. Acute blood loss   - Likely due to diverticulosis   - Appreciate GI recs   - H&H stable, improved    *Syncope: - resolved  - suspect due to dehydration ?vasovagal etiology  - Troponin negative  - CT head reviewed  - TTE reviewed   - PT recommended home with home PT     #Hairline fracture lateral epicondyle l elbow  #OA L shoulder  - sling if ROM or pain worsens  - follow up with Dr. Lagunas in office on TUESDAY 1/5/21.    *HTN:  - Cont Amlodipine, lisinopril. Will resume HCTZ  - Monitor vitals    Dispo: Home with home PT today    12/31: Updated son Justyn and daughter-in-law [place]

## 2021-01-03 LAB
CULTURE RESULTS: SIGNIFICANT CHANGE UP
CULTURE RESULTS: SIGNIFICANT CHANGE UP
SPECIMEN SOURCE: SIGNIFICANT CHANGE UP
SPECIMEN SOURCE: SIGNIFICANT CHANGE UP

## 2021-01-05 ENCOUNTER — APPOINTMENT (OUTPATIENT)
Dept: ORTHOPEDIC SURGERY | Facility: CLINIC | Age: 81
End: 2021-01-05
Payer: MEDICARE

## 2021-01-05 VITALS — BODY MASS INDEX: 27 KG/M2 | HEIGHT: 61 IN | WEIGHT: 143 LBS

## 2021-01-05 DIAGNOSIS — S50.02XA CONTUSION OF LEFT ELBOW, INITIAL ENCOUNTER: ICD-10-CM

## 2021-01-05 DIAGNOSIS — S40.012A CONTUSION OF LEFT SHOULDER, INITIAL ENCOUNTER: ICD-10-CM

## 2021-01-05 DIAGNOSIS — S42.402A UNSPECIFIED FRACTURE OF LOWER END OF LEFT HUMERUS, INITIAL ENCOUNTER FOR CLOSED FRACTURE: ICD-10-CM

## 2021-01-05 PROCEDURE — 99203 OFFICE O/P NEW LOW 30 MIN: CPT

## 2021-01-05 PROCEDURE — 99072 ADDL SUPL MATRL&STAF TM PHE: CPT

## 2021-01-05 PROCEDURE — 73030 X-RAY EXAM OF SHOULDER: CPT | Mod: LT

## 2021-01-05 PROCEDURE — 73080 X-RAY EXAM OF ELBOW: CPT | Mod: LT

## 2021-01-12 NOTE — ADDENDUM
[FreeTextEntry1] : I, Juanita Veliz wrote this note acting as a scribe for Dr. Zafar Lagunas on Jan 05, 2021.

## 2021-01-12 NOTE — PHYSICAL EXAM
[de-identified] : Patient is WDWN, alert, and in no acute distress. Breathing is unlabored. She is grossly oriented to person, place, and time. \par \par Left Elbow: edema and ecchymosis, no localized tenderness\par FROM wiith minimal pain\par \par Left shoulder FROM [de-identified] : Xray of left elbow obtained at [facility] on 12/29/2020 were reviewed and interpreted in office today 01/05/2021 by Dr. Lagunas and revealed questionable hairline fracture. Clinical correlation is suggested regarding pain at that location. Signed by BRADY TOVAR MD; Attending Interventional Radiologist\par \par Xray of left shoulder obtained at [facility] on 12/29/2020 were reviewed and interpreted in office today 01/05/2021 by Dr. Lagunas and revealed no acute bony pathology. Signed by BRADY TOVAR MD; Attending Interventional Radiologist\par \par Left shoulder Xrays obtained today 3v are normal\par \par Left elbow Xrays 3 views are normal

## 2021-01-12 NOTE — END OF VISIT
[FreeTextEntry3] : I, Zafar Lagunas MD, ordering physician, have read and attest that all the information, medical decision making and discharge instructions within are true and accurate.

## 2021-01-12 NOTE — DISCUSSION/SUMMARY
[de-identified] : The underlying pathophysiology was reviewed with the patient. Treatment options were discussed including; observation, NSAIDS, analgesics, bracing, injection(s), physical therapy \par \par Dx. Left Shoulder and Elbow Contusions \par \par Patient is advised to use a hot shower to stretch. \par Range of motion and strengthening exercises were reviewed. \par NSAIDs as tolerated. \par Follow Up in 2 weeks if pain is not resolved.

## 2021-01-12 NOTE — HISTORY OF PRESENT ILLNESS
[de-identified] : Patient is a 80 year old female who presents with left elbow pain. Per her daughter, the patient fainted and hit the elbow on 12/29/2020. She reports that the patient was in immediate pain and was taken to [facility] where x-rays were taken. Patient reports pain in her elbow and left shoulder.

## 2021-01-21 PROCEDURE — 96360 HYDRATION IV INFUSION INIT: CPT

## 2021-01-21 PROCEDURE — 73030 X-RAY EXAM OF SHOULDER: CPT

## 2021-01-21 PROCEDURE — 83690 ASSAY OF LIPASE: CPT

## 2021-01-21 PROCEDURE — 85027 COMPLETE CBC AUTOMATED: CPT

## 2021-01-21 PROCEDURE — 73080 X-RAY EXAM OF ELBOW: CPT

## 2021-01-21 PROCEDURE — 93005 ELECTROCARDIOGRAM TRACING: CPT

## 2021-01-21 PROCEDURE — 84484 ASSAY OF TROPONIN QUANT: CPT

## 2021-01-21 PROCEDURE — 87040 BLOOD CULTURE FOR BACTERIA: CPT

## 2021-01-21 PROCEDURE — 74176 CT ABD & PELVIS W/O CONTRAST: CPT

## 2021-01-21 PROCEDURE — 93306 TTE W/DOPPLER COMPLETE: CPT

## 2021-01-21 PROCEDURE — 83735 ASSAY OF MAGNESIUM: CPT

## 2021-01-21 PROCEDURE — 97116 GAIT TRAINING THERAPY: CPT

## 2021-01-21 PROCEDURE — 82272 OCCULT BLD FECES 1-3 TESTS: CPT

## 2021-01-21 PROCEDURE — 99285 EMERGENCY DEPT VISIT HI MDM: CPT | Mod: 25

## 2021-01-21 PROCEDURE — 70450 CT HEAD/BRAIN W/O DYE: CPT

## 2021-01-21 PROCEDURE — 80048 BASIC METABOLIC PNL TOTAL CA: CPT

## 2021-01-21 PROCEDURE — 85025 COMPLETE CBC W/AUTO DIFF WBC: CPT

## 2021-01-21 PROCEDURE — 83605 ASSAY OF LACTIC ACID: CPT

## 2021-01-21 PROCEDURE — 81001 URINALYSIS AUTO W/SCOPE: CPT

## 2021-01-21 PROCEDURE — 36415 COLL VENOUS BLD VENIPUNCTURE: CPT

## 2021-01-21 PROCEDURE — 80053 COMPREHEN METABOLIC PANEL: CPT

## 2021-01-21 PROCEDURE — 96361 HYDRATE IV INFUSION ADD-ON: CPT

## 2021-01-21 PROCEDURE — 71045 X-RAY EXAM CHEST 1 VIEW: CPT

## 2021-01-21 PROCEDURE — 87635 SARS-COV-2 COVID-19 AMP PRB: CPT

## 2021-01-21 PROCEDURE — 97162 PT EVAL MOD COMPLEX 30 MIN: CPT

## 2021-01-22 ENCOUNTER — APPOINTMENT (OUTPATIENT)
Dept: CARDIOLOGY | Facility: CLINIC | Age: 81
End: 2021-01-22
Payer: MEDICARE

## 2021-01-22 ENCOUNTER — APPOINTMENT (OUTPATIENT)
Dept: CARDIOLOGY | Facility: CLINIC | Age: 81
End: 2021-01-22

## 2021-01-22 ENCOUNTER — NON-APPOINTMENT (OUTPATIENT)
Age: 81
End: 2021-01-22

## 2021-01-22 VITALS
HEIGHT: 61 IN | TEMPERATURE: 96.7 F | SYSTOLIC BLOOD PRESSURE: 146 MMHG | OXYGEN SATURATION: 98 % | DIASTOLIC BLOOD PRESSURE: 74 MMHG | WEIGHT: 141 LBS | BODY MASS INDEX: 26.62 KG/M2 | HEART RATE: 97 BPM | RESPIRATION RATE: 20 BRPM

## 2021-01-22 DIAGNOSIS — R55 SYNCOPE AND COLLAPSE: ICD-10-CM

## 2021-01-22 PROCEDURE — 99214 OFFICE O/P EST MOD 30 MIN: CPT

## 2021-01-22 PROCEDURE — 99072 ADDL SUPL MATRL&STAF TM PHE: CPT

## 2021-01-22 PROCEDURE — 93000 ELECTROCARDIOGRAM COMPLETE: CPT

## 2021-02-09 ENCOUNTER — OUTPATIENT (OUTPATIENT)
Dept: OUTPATIENT SERVICES | Facility: HOSPITAL | Age: 81
LOS: 1 days | End: 2021-02-09
Payer: COMMERCIAL

## 2021-02-09 ENCOUNTER — APPOINTMENT (OUTPATIENT)
Dept: ULTRASOUND IMAGING | Facility: HOSPITAL | Age: 81
End: 2021-02-09
Payer: MEDICARE

## 2021-02-09 DIAGNOSIS — Z98.89 OTHER SPECIFIED POSTPROCEDURAL STATES: Chronic | ICD-10-CM

## 2021-02-09 DIAGNOSIS — Z00.8 ENCOUNTER FOR OTHER GENERAL EXAMINATION: ICD-10-CM

## 2021-02-09 DIAGNOSIS — Z90.710 ACQUIRED ABSENCE OF BOTH CERVIX AND UTERUS: Chronic | ICD-10-CM

## 2021-02-09 PROCEDURE — 76775 US EXAM ABDO BACK WALL LIM: CPT | Mod: 26

## 2021-02-09 PROCEDURE — 76775 US EXAM ABDO BACK WALL LIM: CPT

## 2021-05-21 ENCOUNTER — APPOINTMENT (OUTPATIENT)
Dept: CARDIOLOGY | Facility: CLINIC | Age: 81
End: 2021-05-21

## 2021-09-02 ENCOUNTER — OUTPATIENT (OUTPATIENT)
Dept: OUTPATIENT SERVICES | Facility: HOSPITAL | Age: 81
LOS: 1 days | End: 2021-09-02
Payer: COMMERCIAL

## 2021-09-02 DIAGNOSIS — R10.9 UNSPECIFIED ABDOMINAL PAIN: ICD-10-CM

## 2021-09-02 DIAGNOSIS — Z98.89 OTHER SPECIFIED POSTPROCEDURAL STATES: Chronic | ICD-10-CM

## 2021-09-02 DIAGNOSIS — Z90.710 ACQUIRED ABSENCE OF BOTH CERVIX AND UTERUS: Chronic | ICD-10-CM

## 2021-09-02 PROCEDURE — 72100 X-RAY EXAM L-S SPINE 2/3 VWS: CPT | Mod: 26

## 2021-09-02 PROCEDURE — 76700 US EXAM ABDOM COMPLETE: CPT

## 2021-09-02 PROCEDURE — 76700 US EXAM ABDOM COMPLETE: CPT | Mod: 26

## 2021-09-02 PROCEDURE — 72100 X-RAY EXAM L-S SPINE 2/3 VWS: CPT

## 2021-09-13 ENCOUNTER — APPOINTMENT (OUTPATIENT)
Dept: RADIOLOGY | Facility: HOSPITAL | Age: 81
End: 2021-09-13

## 2021-09-13 ENCOUNTER — APPOINTMENT (OUTPATIENT)
Dept: ULTRASOUND IMAGING | Facility: HOSPITAL | Age: 81
End: 2021-09-13

## 2021-11-18 ENCOUNTER — EMERGENCY (EMERGENCY)
Facility: HOSPITAL | Age: 81
LOS: 1 days | Discharge: HOME | End: 2021-11-18
Attending: INTERNAL MEDICINE | Admitting: INTERNAL MEDICINE
Payer: COMMERCIAL

## 2021-11-18 VITALS
RESPIRATION RATE: 16 BRPM | OXYGEN SATURATION: 98 % | HEART RATE: 85 BPM | TEMPERATURE: 98 F | HEIGHT: 60 IN | WEIGHT: 130.07 LBS | DIASTOLIC BLOOD PRESSURE: 73 MMHG | SYSTOLIC BLOOD PRESSURE: 161 MMHG

## 2021-11-18 VITALS
RESPIRATION RATE: 16 BRPM | HEART RATE: 79 BPM | OXYGEN SATURATION: 97 % | DIASTOLIC BLOOD PRESSURE: 73 MMHG | SYSTOLIC BLOOD PRESSURE: 166 MMHG

## 2021-11-18 DIAGNOSIS — Z98.89 OTHER SPECIFIED POSTPROCEDURAL STATES: Chronic | ICD-10-CM

## 2021-11-18 DIAGNOSIS — Z90.710 ACQUIRED ABSENCE OF BOTH CERVIX AND UTERUS: Chronic | ICD-10-CM

## 2021-11-18 LAB
ALBUMIN SERPL ELPH-MCNC: 3.9 G/DL — SIGNIFICANT CHANGE UP (ref 3.3–5)
ALP SERPL-CCNC: 83 U/L — SIGNIFICANT CHANGE UP (ref 40–120)
ALT FLD-CCNC: 20 U/L — SIGNIFICANT CHANGE UP (ref 10–45)
ANION GAP SERPL CALC-SCNC: 8 MMOL/L — SIGNIFICANT CHANGE UP (ref 5–17)
ANION GAP SERPL CALC-SCNC: 9 MMOL/L — SIGNIFICANT CHANGE UP (ref 5–17)
APPEARANCE UR: CLEAR — SIGNIFICANT CHANGE UP
AST SERPL-CCNC: 35 U/L — SIGNIFICANT CHANGE UP (ref 10–40)
BASOPHILS # BLD AUTO: 0.03 K/UL — SIGNIFICANT CHANGE UP (ref 0–0.2)
BASOPHILS NFR BLD AUTO: 0.3 % — SIGNIFICANT CHANGE UP (ref 0–2)
BILIRUB SERPL-MCNC: 0.8 MG/DL — SIGNIFICANT CHANGE UP (ref 0.2–1.2)
BILIRUB UR-MCNC: NEGATIVE — SIGNIFICANT CHANGE UP
BUN SERPL-MCNC: 18 MG/DL — SIGNIFICANT CHANGE UP (ref 7–23)
BUN SERPL-MCNC: 21 MG/DL — SIGNIFICANT CHANGE UP (ref 7–23)
CALCIUM SERPL-MCNC: 9.1 MG/DL — SIGNIFICANT CHANGE UP (ref 8.4–10.5)
CALCIUM SERPL-MCNC: 9.5 MG/DL — SIGNIFICANT CHANGE UP (ref 8.4–10.5)
CHLORIDE SERPL-SCNC: 101 MMOL/L — SIGNIFICANT CHANGE UP (ref 96–108)
CHLORIDE SERPL-SCNC: 94 MMOL/L — LOW (ref 96–108)
CO2 SERPL-SCNC: 26 MMOL/L — SIGNIFICANT CHANGE UP (ref 22–31)
CO2 SERPL-SCNC: 27 MMOL/L — SIGNIFICANT CHANGE UP (ref 22–31)
COLOR SPEC: YELLOW — SIGNIFICANT CHANGE UP
CREAT SERPL-MCNC: 0.84 MG/DL — SIGNIFICANT CHANGE UP (ref 0.5–1.3)
CREAT SERPL-MCNC: 0.85 MG/DL — SIGNIFICANT CHANGE UP (ref 0.5–1.3)
DIFF PNL FLD: NEGATIVE — SIGNIFICANT CHANGE UP
EOSINOPHIL # BLD AUTO: 0.05 K/UL — SIGNIFICANT CHANGE UP (ref 0–0.5)
EOSINOPHIL NFR BLD AUTO: 0.6 % — SIGNIFICANT CHANGE UP (ref 0–6)
GLUCOSE SERPL-MCNC: 109 MG/DL — HIGH (ref 70–99)
GLUCOSE SERPL-MCNC: 124 MG/DL — HIGH (ref 70–99)
GLUCOSE UR QL: NEGATIVE — SIGNIFICANT CHANGE UP
HCT VFR BLD CALC: 38.5 % — SIGNIFICANT CHANGE UP (ref 34.5–45)
HGB BLD-MCNC: 13.4 G/DL — SIGNIFICANT CHANGE UP (ref 11.5–15.5)
IMM GRANULOCYTES NFR BLD AUTO: 0.5 % — SIGNIFICANT CHANGE UP (ref 0–1.5)
KETONES UR-MCNC: NEGATIVE — SIGNIFICANT CHANGE UP
LEUKOCYTE ESTERASE UR-ACNC: NEGATIVE — SIGNIFICANT CHANGE UP
LIDOCAIN IGE QN: 98 U/L — SIGNIFICANT CHANGE UP (ref 73–393)
LYMPHOCYTES # BLD AUTO: 1.74 K/UL — SIGNIFICANT CHANGE UP (ref 1–3.3)
LYMPHOCYTES # BLD AUTO: 20.1 % — SIGNIFICANT CHANGE UP (ref 13–44)
MCHC RBC-ENTMCNC: 32.2 PG — SIGNIFICANT CHANGE UP (ref 27–34)
MCHC RBC-ENTMCNC: 34.8 GM/DL — SIGNIFICANT CHANGE UP (ref 32–36)
MCV RBC AUTO: 92.5 FL — SIGNIFICANT CHANGE UP (ref 80–100)
MONOCYTES # BLD AUTO: 0.65 K/UL — SIGNIFICANT CHANGE UP (ref 0–0.9)
MONOCYTES NFR BLD AUTO: 7.5 % — SIGNIFICANT CHANGE UP (ref 2–14)
NEUTROPHILS # BLD AUTO: 6.14 K/UL — SIGNIFICANT CHANGE UP (ref 1.8–7.4)
NEUTROPHILS NFR BLD AUTO: 71 % — SIGNIFICANT CHANGE UP (ref 43–77)
NITRITE UR-MCNC: NEGATIVE — SIGNIFICANT CHANGE UP
NRBC # BLD: 0 /100 WBCS — SIGNIFICANT CHANGE UP (ref 0–0)
PH UR: 7 — SIGNIFICANT CHANGE UP (ref 5–8)
PLATELET # BLD AUTO: 274 K/UL — SIGNIFICANT CHANGE UP (ref 150–400)
POTASSIUM SERPL-MCNC: 3.5 MMOL/L — SIGNIFICANT CHANGE UP (ref 3.5–5.3)
POTASSIUM SERPL-MCNC: 3.8 MMOL/L — SIGNIFICANT CHANGE UP (ref 3.5–5.3)
POTASSIUM SERPL-SCNC: 3.5 MMOL/L — SIGNIFICANT CHANGE UP (ref 3.5–5.3)
POTASSIUM SERPL-SCNC: 3.8 MMOL/L — SIGNIFICANT CHANGE UP (ref 3.5–5.3)
PROT SERPL-MCNC: 8.1 G/DL — SIGNIFICANT CHANGE UP (ref 6–8.3)
PROT UR-MCNC: NEGATIVE — SIGNIFICANT CHANGE UP
RBC # BLD: 4.16 M/UL — SIGNIFICANT CHANGE UP (ref 3.8–5.2)
RBC # FLD: 12.2 % — SIGNIFICANT CHANGE UP (ref 10.3–14.5)
SODIUM SERPL-SCNC: 129 MMOL/L — LOW (ref 135–145)
SODIUM SERPL-SCNC: 136 MMOL/L — SIGNIFICANT CHANGE UP (ref 135–145)
SP GR SPEC: 1 — LOW (ref 1.01–1.02)
UROBILINOGEN FLD QL: NEGATIVE — SIGNIFICANT CHANGE UP
WBC # BLD: 8.65 K/UL — SIGNIFICANT CHANGE UP (ref 3.8–10.5)
WBC # FLD AUTO: 8.65 K/UL — SIGNIFICANT CHANGE UP (ref 3.8–10.5)

## 2021-11-18 PROCEDURE — 71045 X-RAY EXAM CHEST 1 VIEW: CPT

## 2021-11-18 PROCEDURE — 80048 BASIC METABOLIC PNL TOTAL CA: CPT

## 2021-11-18 PROCEDURE — 80053 COMPREHEN METABOLIC PANEL: CPT

## 2021-11-18 PROCEDURE — 85025 COMPLETE CBC W/AUTO DIFF WBC: CPT

## 2021-11-18 PROCEDURE — 74177 CT ABD & PELVIS W/CONTRAST: CPT | Mod: MA

## 2021-11-18 PROCEDURE — 87086 URINE CULTURE/COLONY COUNT: CPT

## 2021-11-18 PROCEDURE — 74177 CT ABD & PELVIS W/CONTRAST: CPT | Mod: 26,MA

## 2021-11-18 PROCEDURE — 83690 ASSAY OF LIPASE: CPT

## 2021-11-18 PROCEDURE — 71045 X-RAY EXAM CHEST 1 VIEW: CPT | Mod: 26

## 2021-11-18 PROCEDURE — 36415 COLL VENOUS BLD VENIPUNCTURE: CPT

## 2021-11-18 PROCEDURE — 99285 EMERGENCY DEPT VISIT HI MDM: CPT

## 2021-11-18 PROCEDURE — 96361 HYDRATE IV INFUSION ADD-ON: CPT

## 2021-11-18 PROCEDURE — 96360 HYDRATION IV INFUSION INIT: CPT | Mod: XU

## 2021-11-18 PROCEDURE — 99284 EMERGENCY DEPT VISIT MOD MDM: CPT | Mod: 25

## 2021-11-18 RX ORDER — ACETAMINOPHEN 500 MG
975 TABLET ORAL ONCE
Refills: 0 | Status: COMPLETED | OUTPATIENT
Start: 2021-11-18 | End: 2021-11-18

## 2021-11-18 RX ORDER — SODIUM CHLORIDE 9 MG/ML
500 INJECTION INTRAMUSCULAR; INTRAVENOUS; SUBCUTANEOUS ONCE
Refills: 0 | Status: COMPLETED | OUTPATIENT
Start: 2021-11-18 | End: 2021-11-18

## 2021-11-18 RX ORDER — SODIUM CHLORIDE 9 MG/ML
1000 INJECTION INTRAMUSCULAR; INTRAVENOUS; SUBCUTANEOUS ONCE
Refills: 0 | Status: COMPLETED | OUTPATIENT
Start: 2021-11-18 | End: 2021-11-18

## 2021-11-18 RX ADMIN — SODIUM CHLORIDE 1000 MILLILITER(S): 9 INJECTION INTRAMUSCULAR; INTRAVENOUS; SUBCUTANEOUS at 17:05

## 2021-11-18 RX ADMIN — SODIUM CHLORIDE 500 MILLILITER(S): 9 INJECTION INTRAMUSCULAR; INTRAVENOUS; SUBCUTANEOUS at 15:42

## 2021-11-18 RX ADMIN — SODIUM CHLORIDE 1000 MILLILITER(S): 9 INJECTION INTRAMUSCULAR; INTRAVENOUS; SUBCUTANEOUS at 15:42

## 2021-11-18 RX ADMIN — SODIUM CHLORIDE 1000 MILLILITER(S): 9 INJECTION INTRAMUSCULAR; INTRAVENOUS; SUBCUTANEOUS at 14:35

## 2021-11-18 RX ADMIN — Medication 975 MILLIGRAM(S): at 18:24

## 2021-11-18 RX ADMIN — Medication 1 TABLET(S): at 17:05

## 2021-11-18 NOTE — ED PROVIDER NOTE - PROGRESS NOTE DETAILS
PA Tiberio- CT Ab/pelvis revealing perisigmoid inflammatory changes related to recent diverticulitis. No abscess. WBC stable. Na+129- fluids given. Will treat with Augmentin with GI follow up PA Tiberio- repeat NA+ normalized

## 2021-11-18 NOTE — ED ADULT NURSE NOTE - OBJECTIVE STATEMENT
Pt a&ox3 BIB EMS from home c/o RLQ abdominal pain for 1.5 days. Pt with h/o diverticulitis. Denies n/v/d. Denies fever/chills. Denies urinary symptoms. Denies cp, sob.

## 2021-11-18 NOTE — ED PROVIDER NOTE - OBJECTIVE STATEMENT
80 y/o F with h/o HTN pw diffuse lower abdominal discomfort waxing and waning over the past month but constant and worse x 1 day accompanied with nausea. Denies fever, chills, headache, chest pain, shortness of breath, vomiting, diarrhea, dysuria, weakness, numbness, tingling. Treated for a Diverticulitis 1 month ago with Cipro/Flagyl.  Surg hx: Hysterectomy, hernia repair  No smoking hx No ETOH  PMD- Tato

## 2021-11-18 NOTE — GOALS OF CARE CONVERSATION - ADVANCED CARE PLANNING - CONVERSATION DETAILS
Met with patient at bedside. Although she has heavy [place] accent, she stated she generally understood English, and understood what I was saying. She stated that her son Jennifer or son Kevin would be her health agents if she were unable to make decisions on her own. She is alert and oriented to person, place, time, situation. Discussed GOC, she stated that she would want attempted resuscitation or intubation in the event of cardiac arrest or cardiopulmonary decompensation. Full Code. I attempted to contact daughter Jennifer, no answer. I spoke to son Kevin. He stated he did not know if his mother ever signed a HCP form. I informed him that his mother wants Full Code.

## 2021-11-18 NOTE — ED PROVIDER NOTE - NSFOLLOWUPINSTRUCTIONS_ED_ALL_ED_FT
Follow up with the Gastroenterologist within the week- referral above or you can call: Find a Physician helpline ([phone]) for assistance   Take Augmentin 875mg 1 tab 2x/day for 10 days  Take Tylenol 650mg every 4-6 hours as needed for pain   Increase your fluids.   Worsening, continued or ANY new concerning symptoms return to the emergency department.       Diverticulitis       Diverticulitis is infection or inflammation of small pouches (diverticula) in the colon that form due to a condition called diverticulosis. Diverticula can trap stool (feces) and bacteria, causing infection and inflammation.    Diverticulitis may cause severe stomach pain and diarrhea. It may lead to tissue damage in the colon that causes bleeding or blockage. The diverticula may also burst (rupture) and cause infected stool to enter other areas of the abdomen.      What are the causes?    This condition is caused by stool becoming trapped in the diverticula, which allows bacteria to grow in the diverticula. This leads to inflammation and infection.      What increases the risk?  You are more likely to develop this condition if you have diverticulosis. The risk increases if you:  •Are overweight or obese.      •Do not get enough exercise.      •Drink alcohol.      •Use tobacco products.      •Eat a diet that has a lot of red meat such as beef, pork, or lamb.      •Eat a diet that does not include enough fiber. High-fiber foods include fruits, vegetables, beans, nuts, and whole grains.      •Are over 40 years of age.        What are the signs or symptoms?  Symptoms of this condition may include:  •Pain and tenderness in the abdomen. The pain is normally located on the left side of the abdomen, but it may occur in other areas.      •Fever and chills.      •Nausea.      •Vomiting.      •Cramping.      •Bloating.      •Changes in bowel routines.      •Blood in your stool.        How is this diagnosed?  This condition is diagnosed based on:  •Your medical history.      •A physical exam.    •Tests to make sure there is nothing else causing your condition. These tests may include:  •Blood tests.      •Urine tests.      •CT scan of the abdomen.          How is this treated?  Most cases of this condition are mild and can be treated at home. Treatment may include:  •Taking over-the-counter pain medicines.      •Following a clear liquid diet.      •Taking antibiotic medicines by mouth.      •Resting.    More severe cases may need to be treated at a hospital. Treatment may include:  •Not eating or drinking.      •Taking prescription pain medicine.      •Receiving antibiotic medicines through an IV.      •Receiving fluids and nutrition through an IV.      •Surgery.      When your condition is under control, your health care provider may recommend that you have a colonoscopy. This is an exam to look at the entire large intestine. During the exam, a lubricated, bendable tube is inserted into the anus and then passed into the rectum, colon, and other parts of the large intestine. A colonoscopy can show how severe your diverticula are and whether something else may be causing your symptoms.      Follow these instructions at home:    Medicines     •Take over-the-counter and prescription medicines only as told by your health care provider. These include fiber supplements, probiotics, and stool softeners.      •If you were prescribed an antibiotic medicine, take it as told by your health care provider. Do not stop taking the antibiotic even if you start to feel better.      •Ask your health care provider if the medicine prescribed to you requires you to avoid driving or using machinery.        Eating and drinking      •Follow a full liquid diet or another diet as directed by your health care provider.    •After your symptoms improve, your health care provider may tell you to change your diet. He or she may recommend that you eat a diet that contains at least 25 grams (25 g) of fiber daily. Fiber makes it easier to pass stool. Healthy sources of fiber include:  •Berries. One cup contains 4–8 grams of fiber.      •Beans or lentils. One-half cup contains 5–8 grams of fiber.      •Green vegetables. One cup contains 4 grams of fiber.        •Avoid eating red meat.      General instructions     • Do not use any products that contain nicotine or tobacco, such as cigarettes, e-cigarettes, and chewing tobacco. If you need help quitting, ask your health care provider.      •Exercise for at least 30 minutes, 3 times each week. You should exercise hard enough to raise your heart rate and break a sweat.      •Keep all follow-up visits as told by your health care provider. This is important. You may need to have a colonoscopy.        Contact a health care provider if:    •Your pain does not improve.      •Your bowel movements do not return to normal.        Get help right away if:    •Your pain gets worse.      •Your symptoms do not get better with treatment.      •Your symptoms suddenly get worse.      •You have a fever.      •You vomit more than one time.      •You have stools that are bloody, black, or tarry.        Summary    •Diverticulitis is infection or inflammation of small pouches (diverticula) in the colon that form due to a condition called diverticulosis. Diverticula can trap stool (feces) and bacteria, causing infection and inflammation.      •You are at higher risk for this condition if you have diverticulosis and you eat a diet that does not include enough fiber.      •Most cases of this condition are mild and can be treated at home. More severe cases may need to be treated at a hospital.      •When your condition is under control, your health care provider may recommend that you have an exam called a colonoscopy. This exam can show how severe your diverticula are and whether something else may be causing your symptoms.      •Keep all follow-up visits as told by your health care provider. This is important.      This information is not intended to replace advice given to you by your health care provider. Make sure you discuss any questions you have with your health care provider. Follow up with the Gastroenterologist within the week- referral above or you can call: Find a Physician helpline ([phone]) for assistance   Take Augmentin 875mg 1 tab 2x/day for 10 days  Take Tylenol 650mg every 4-6 hours as needed for pain   Increase your fluids.   Worsening, continued or ANY new concerning symptoms return to the emergency department.     Diverticulitis    Diverticulitis is infection or inflammation of small pouches (diverticula) in the colon that form due to a condition called diverticulosis. Diverticula can trap stool (feces) and bacteria, causing infection and inflammation.    Diverticulitis may cause severe stomach pain and diarrhea. It may lead to tissue damage in the colon that causes bleeding or blockage. The diverticula may also burst (rupture) and cause infected stool to enter other areas of the abdomen.      What are the causes?    This condition is caused by stool becoming trapped in the diverticula, which allows bacteria to grow in the diverticula. This leads to inflammation and infection.      What increases the risk?  You are more likely to develop this condition if you have diverticulosis. The risk increases if you:  •Are overweight or obese.      •Do not get enough exercise.      •Drink alcohol.      •Use tobacco products.      •Eat a diet that has a lot of red meat such as beef, pork, or lamb.      •Eat a diet that does not include enough fiber. High-fiber foods include fruits, vegetables, beans, nuts, and whole grains.      •Are over 40 years of age.        What are the signs or symptoms?  Symptoms of this condition may include:  •Pain and tenderness in the abdomen. The pain is normally located on the left side of the abdomen, but it may occur in other areas.      •Fever and chills.      •Nausea.      •Vomiting.      •Cramping.      •Bloating.      •Changes in bowel routines.      •Blood in your stool.        How is this diagnosed?  This condition is diagnosed based on:  •Your medical history.      •A physical exam.    •Tests to make sure there is nothing else causing your condition. These tests may include:  •Blood tests.      •Urine tests.      •CT scan of the abdomen.          How is this treated?  Most cases of this condition are mild and can be treated at home. Treatment may include:  •Taking over-the-counter pain medicines.      •Following a clear liquid diet.      •Taking antibiotic medicines by mouth.      •Resting.    More severe cases may need to be treated at a hospital. Treatment may include:  •Not eating or drinking.      •Taking prescription pain medicine.      •Receiving antibiotic medicines through an IV.      •Receiving fluids and nutrition through an IV.      •Surgery.      When your condition is under control, your health care provider may recommend that you have a colonoscopy. This is an exam to look at the entire large intestine. During the exam, a lubricated, bendable tube is inserted into the anus and then passed into the rectum, colon, and other parts of the large intestine. A colonoscopy can show how severe your diverticula are and whether something else may be causing your symptoms.      Follow these instructions at home:    Medicines     •Take over-the-counter and prescription medicines only as told by your health care provider. These include fiber supplements, probiotics, and stool softeners.      •If you were prescribed an antibiotic medicine, take it as told by your health care provider. Do not stop taking the antibiotic even if you start to feel better.      •Ask your health care provider if the medicine prescribed to you requires you to avoid driving or using machinery.        Eating and drinking      •Follow a full liquid diet or another diet as directed by your health care provider.    •After your symptoms improve, your health care provider may tell you to change your diet. He or she may recommend that you eat a diet that contains at least 25 grams (25 g) of fiber daily. Fiber makes it easier to pass stool. Healthy sources of fiber include:  •Berries. One cup contains 4–8 grams of fiber.      •Beans or lentils. One-half cup contains 5–8 grams of fiber.      •Green vegetables. One cup contains 4 grams of fiber.        •Avoid eating red meat.      General instructions     • Do not use any products that contain nicotine or tobacco, such as cigarettes, e-cigarettes, and chewing tobacco. If you need help quitting, ask your health care provider.      •Exercise for at least 30 minutes, 3 times each week. You should exercise hard enough to raise your heart rate and break a sweat.      •Keep all follow-up visits as told by your health care provider. This is important. You may need to have a colonoscopy.        Contact a health care provider if:    •Your pain does not improve.      •Your bowel movements do not return to normal.        Get help right away if:    •Your pain gets worse.      •Your symptoms do not get better with treatment.      •Your symptoms suddenly get worse.      •You have a fever.      •You vomit more than one time.      •You have stools that are bloody, black, or tarry.        Summary    •Diverticulitis is infection or inflammation of small pouches (diverticula) in the colon that form due to a condition called diverticulosis. Diverticula can trap stool (feces) and bacteria, causing infection and inflammation.      •You are at higher risk for this condition if you have diverticulosis and you eat a diet that does not include enough fiber.      •Most cases of this condition are mild and can be treated at home. More severe cases may need to be treated at a hospital.      •When your condition is under control, your health care provider may recommend that you have an exam called a colonoscopy. This exam can show how severe your diverticula are and whether something else may be causing your symptoms.      •Keep all follow-up visits as told by your health care provider. This is important.      This information is not intended to replace advice given to you by your health care provider. Make sure you discuss any questions you have with your health care provider.

## 2021-11-18 NOTE — ED PROVIDER NOTE - CLINICAL SUMMARY MEDICAL DECISION MAKING FREE TEXT BOX
80 y/o F with h/o HTN pw diffuse lower abdominal discomfort waxing and waning over the past month but constant and worse x 1 day accompanied with nausea. Denies fever, chills, headache, chest pain, shortness of breath, vomiting, diarrhea, dysuria, weakness, numbness, tingling. Treated for a Diverticulitis 1 month ago with Cipro/Flagyl.  Surg hx: Hysterectomy, hernia repair  No smoking hx No ETOH  PMD- Tato   Plan: Will obtain basic labs with lipase, check UA, UCX, CT Ab/pelvis, give fluids, ABX and reassess 82 y/o F with h/o HTN pw diffuse lower abdominal discomfort waxing and waning over the past month but constant and worse x 1 day accompanied with nausea. Denies fever, chills, headache, chest pain, shortness of breath, vomiting, diarrhea, dysuria, weakness, numbness, tingling. Treated for a Diverticulitis 1 month ago with Cipro/Flagyl.  Surg hx: Hysterectomy, hernia repair  No smoking hx No ETOH  PMD- Tato   Plan: Will obtain basic labs with lipase, check UA, UCX, CT Ab/pelvis, give fluids, ABX and reassess  **update: CT Ab/pelvis revealing perisigmoid inflammatory changes related to recent diverticulitis. No abscess. WBC stable. Na+129- fluids given. Will treat with Augmentin with GI follow up

## 2021-11-18 NOTE — ED PROVIDER NOTE - CARE PROVIDER_API CALL
Connor Nelson (MD)  Gastroenterology; Internal Medicine  [address] [place] [address]  Phone: [phone]  Fax: [phone]  Follow Up Time:

## 2021-11-18 NOTE — CHART NOTE - NSCHARTNOTEFT_GEN_A_CORE
SW met with patient at bedside to assess for social work needs and to complete home assessment of safety.  Patient is a 81 year old female presenting to ED for abdominal pain.  Patient reports she lives alone and is independent.  Patient reports that sometimes she gets lonely being home alone.  SW spoke with patient about family and support. Patient reports she has a daughter and son that live "far away", but reports she can have her daughter in law come by if needed.  Patient reports her son drives her to medical appointments as needed. Patient reports she is able to go to her daughters house for company but she states she likes staying home and she is more comfortable at home.  Patient denies the need for a home health aide or more assistance in the home.  SW spoke with Dr Dupree- patient will need gastro follow up.  SW spoke with patient about scheduling appointment.  Patient asked SW to contact EC (son Justyn).  SW called son and spoke with him regarding appt.  Son confirms he will be able to take her and asked SW to schedule earlier appt.  Due to office hours, SW agreed to call in morning to schedule.  Son and patient in agreement - SW will follow up to provide appt day and time.

## 2021-11-18 NOTE — ED ADULT NURSE REASSESSMENT NOTE - NS ED NURSE REASSESS COMMENT FT1
Patient handoff received from SARA umana. Patient is AOx3, NAD at this time. safety maintained, awaiting son for transportation home.

## 2021-11-18 NOTE — ED PROVIDER NOTE - PATIENT PORTAL LINK FT
You can access the FollowMyHealth Patient Portal offered by [facility] by registering at the following website: [email or web address]. By joining [facility]’s FollowMyHealth portal, you will also be able to view your health information using other applications (apps) compatible with our system.

## 2021-11-18 NOTE — ED PROVIDER NOTE - ATTENDING CONTRIBUTION TO CARE
82 y/o F with h/o HTN pw diffuse lower abdominal discomfort waxing and waning over the past month but constant and worse x 1 day accompanied with nausea. Denies fever, chills, headache, chest pain, shortness of breath, vomiting, diarrhea, dysuria, weakness, numbness, tingling. Treated for a Diverticulitis 1 month ago with Cipro/Flagyl.  Surg hx: Hysterectomy, hernia repair  No smoking hx No ETOH  PMD- Tato   Plan: Will obtain basic labs with lipase, check UA, UCX, CT Ab/pelvis, give fluids, ABX and reassess  **update: CT Ab/pelvis revealing perisigmoid inflammatory changes related to recent diverticulitis. No abscess. WBC stable. Na+129- fluids given. Will treat with Augmentin with GI follow up  Dr. Dupree:  I have reviewed and discussed with the PA/ resident the case specifics, including the history, physical assessment, evaluation, conclusion, laboratory results, and medical plan. I agree with the contents, and conclusions. I have personally examined, and interviewed the patient.

## 2021-11-19 LAB
CULTURE RESULTS: SIGNIFICANT CHANGE UP
SPECIMEN SOURCE: SIGNIFICANT CHANGE UP

## 2021-11-22 NOTE — CHART NOTE - NSCHARTNOTEFT_GEN_A_CORE
SW called patient to discuss and assist with follow up care.  Patient presented to ED on 11/18/21 for abdominal pain.  SW attempted to call patient numerous times since DC to assist with scheduling follow up care after discharge, no answer.  According to HICORNELIA, patient has follow up appt with gastro scheduled for 11/26/21.

## 2021-11-26 ENCOUNTER — APPOINTMENT (OUTPATIENT)
Dept: SURGERY | Facility: CLINIC | Age: 81
End: 2021-11-26
Payer: MEDICARE

## 2021-11-26 VITALS — TEMPERATURE: 97.1 F | BODY MASS INDEX: 25.76 KG/M2 | WEIGHT: 140 LBS | HEIGHT: 62 IN

## 2021-11-26 PROCEDURE — 99203 OFFICE O/P NEW LOW 30 MIN: CPT

## 2021-11-26 NOTE — PHYSICAL EXAM
[Normal Breath Sounds] : Normal breath sounds [Normal Heart Sounds] : normal heart sounds [Normal Rate and Rhythm] : normal rate and rhythm [Abdominal Masses] : No abdominal masses [Abdomen Tenderness] : ~T ~M No abdominal tenderness [No Rash or Lesion] : No rash or lesion [de-identified] : nl [de-identified] : nl [de-identified] : nl

## 2021-11-26 NOTE — HISTORY OF PRESENT ILLNESS
[de-identified] : This 81 year old woman was traveling in [place] six weeks ago when she suddenly developed LLQ abdominal pain. She was admitted for three days and treated for diverticulitis. One week ago, she developed similar symptoms and was examined in the  ED. A CT scan demonstrated slight, residual inflammation in the sigmoid. The patient also passed a large amount of BRBPR while in [place].

## 2021-11-26 NOTE — ASSESSMENT
[FreeTextEntry1] : Long discussion regarding all options and risks\par To finish the present course of oral antibiotics\par To return in two weeks\par To undergo a colonoscopy in two months. All alb values and imaging studies reviewed\par Discussed with Medicine

## 2021-12-10 ENCOUNTER — TRANSCRIPTION ENCOUNTER (OUTPATIENT)
Age: 81
End: 2021-12-10

## 2021-12-10 ENCOUNTER — APPOINTMENT (OUTPATIENT)
Dept: SURGERY | Facility: CLINIC | Age: 81
End: 2021-12-10
Payer: MEDICARE

## 2021-12-10 VITALS — BODY MASS INDEX: 25.76 KG/M2 | TEMPERATURE: 97.2 F | HEIGHT: 62 IN | WEIGHT: 140 LBS

## 2021-12-10 PROCEDURE — 99214 OFFICE O/P EST MOD 30 MIN: CPT

## 2021-12-11 NOTE — HISTORY OF PRESENT ILLNESS
[de-identified] : The patient is slightly improved but still is aware of RIGHT sided lower abdominal pain. She has been eating well and passing normal stool. The patient denies fever or chills.

## 2021-12-11 NOTE — ASSESSMENT
[FreeTextEntry1] : Long discussion regarding all options and risks\par Return in one month\par To continue low fiber diet\par Schedule colonoscopy in two months\par All lab values and imaging studies reviewed\par Discussed with Medicine

## 2021-12-11 NOTE — PHYSICAL EXAM
[Normal Breath Sounds] : Normal breath sounds [Normal Heart Sounds] : normal heart sounds [Normal Rate and Rhythm] : normal rate and rhythm [Abdominal Masses] : No abdominal masses [Abdomen Tenderness] : ~T ~M Abdominal tenderness [No Rash or Lesion] : No rash or lesion [de-identified] : nl [de-identified] : nl [de-identified] : RLQ tenderness [de-identified] : nl

## 2022-01-06 NOTE — PATIENT PROFILE ADULT - NSPROPTRIGHTSUPPORTPERSON_GEN_A_NUR
[facility]  Phone: [phone] Fax: [phone]        Physical Therapy Aquatic Flow Sheet   Date:  2022    Patient Name:  Enriqueta Belcher    :  1942  Restrictions/Precautions:    Diagnosis:    Treatment Diagnosis:    Insurance/Certification information:  [place]  Referring Physician:    Plan of care signed (Y/N):    Visit# / total visits:   Pain level: 7-8/10     lectronically signed by:  Roman Estrella PTA  Time In:1400  Time Out:1500  Key  B= Belt DB= Dumbells T= Theratube   H= Hydrotone N= Noodles W= Weights   P= Paddles S= Speedo equipment K= Kickboard     Exercises/Activities   Warm-up/Amb  Minutes  Exercises  Minutes    Slow forward  5  HR/TR  5    Slow sideways  5  Marches  5    Slow backwards  5  Mini-squats  5    Medium forward    4-way SLR  5    Medium sideways    Hip circles/fig 8  5    Small shuffle    Hamstring curls  5    Jog    Knee extension  5    Braiding    Pelvic tilts      Bicycling  5  Scap squeezes          Shoulder flex/ext      Functional    Shoulder abd/add      Step    Shoulder H. abd/add      Lifting    Shoulder IR/ER      Hand to opp knee    Rowing      Push down squat    Bilateral pull down      UE PNF    Push/pull      LE PNF    Push downs      Wall push ups    Arm circles      SLS    Elbow flex/ext          Chin tuck      Stretching    UT shrugs/rolls      Gastroc/Soleus    Rocking horse      Hamstring          SKTC    Other  post rx pain control    Piriformis    BLE/trunk stretch   x 5    Hip flexor          Ladder pull          Pec stretch          Post deltoid           Timed Code Treatment Minutes:  60    Total Treatment Minutes:  60    Treatment/Activity Tolerance:  [x] Patient tolerated treatment well [] Patient limited by fatique  [] Patient limited by pain [] Patient limited by other medical complications  [x] Other: Pt achieves all safety goals. 1.  Pt able to enter, exit and use pool safely.   2.  Pt able to self initiate exercises and complete using G form. 3.  Pt receptive that HEP is to be done; as not to lose progress gained. 4.  Better effort with upright postural alignment. Postural education during . session; pt learns proper seated and standing alignment. Verbal and tactile cues for pt to stand and sit correctly thru session. Issued handout for towel roll to SI joints for pain control and postural wall standing to initiate strengthening. Pt receptive.           Prognosis: [] Good [x] Fair  [] Poor    Patient Requires Follow-up: [x] Yes  [] No    Plan:   [x] Continue per plan of care [] Alter current plan (see comments)  [] Plan of care initiated [] Hold pending MD visit [] Discharge    Treatment Charges: Mins Units   Initial Evaluation     Re-Evaluation     Ther Exercise         TE     Aquatic Treatment 60 4   Ther Activities        TA     Gait Training          GT     Neuro Re-education NR     Modalities     Non-Billable Service Time     Other     Total Time/Units 60 4     N  Electronically signed by:  Beka Galeano PTA [id number]

## 2022-04-08 NOTE — PHYSICAL THERAPY INITIAL EVALUATION ADULT - WORK/LEISURE ACTIVITY, REHAB EVAL
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  Repeat bone marrow biopsy after cycle 8 showing evidence of persistent complete remission. \par \par -Will continue with dacogen/venetoclax q5wks given troubles with cytopenias, along with OnPro (day 5).Today is cycle 22 day 4.\par -Thrush resolved now. Off Nystatin for now but will continue flucanazole (also for consideration with venetoclax dosing). \par -Venetoclax daily, dosed at 200mg for 10 days with each cycle. \par -Patient knows to take Levaquin when she is neutropenic, but will stay on diflucan continuously for stable venetoclax dosing effect. \par -will continue home draw at this time weekly. Possible platelets if needed. \par -BM bx done on 2/3/20 showed normal cytogenetics. In house assay for FLT-3 ITD positive, which confers an adverse prognosis in AML - previously had been d/w patient and family about BMT transplant -pt active prior to admission, good PS status; however, she was not interested. \par -patient had severe lower back pain related to DDD and apparent pinched nerve has improved on low dose gabapentin 100 mg QHS, may have anxiolytic effect as well. \par -Patient likely suffering from depression related to her illness. Discussed with daughter-in-law at length and there is concern that she has chronic depression and there are concerns about her mental state. When questioned the patient adamantly denies any SI/HI. reached out to [facility] to speak with patient today. Will send antidepressant to local pharmacy as patient is willing to take medication to help with depressive symptoms and also, discussed side effects of them. Family support in place as well. \par -Patient's  now diagnosed with metastatic prostate cancer. Likely will ultimately require more assistance at home. Completed forms for transportation given to daughter-in-law today.\par -Follow up in 1 month\par  independent

## 2022-04-12 ENCOUNTER — OUTPATIENT (OUTPATIENT)
Dept: OUTPATIENT SERVICES | Facility: HOSPITAL | Age: 82
LOS: 1 days | End: 2022-04-12
Payer: COMMERCIAL

## 2022-04-12 ENCOUNTER — APPOINTMENT (OUTPATIENT)
Dept: CT IMAGING | Facility: HOSPITAL | Age: 82
End: 2022-04-12
Payer: MEDICARE

## 2022-04-12 DIAGNOSIS — Z98.89 OTHER SPECIFIED POSTPROCEDURAL STATES: Chronic | ICD-10-CM

## 2022-04-12 DIAGNOSIS — Z90.710 ACQUIRED ABSENCE OF BOTH CERVIX AND UTERUS: Chronic | ICD-10-CM

## 2022-04-12 DIAGNOSIS — Z00.8 ENCOUNTER FOR OTHER GENERAL EXAMINATION: ICD-10-CM

## 2022-04-12 PROCEDURE — 70450 CT HEAD/BRAIN W/O DYE: CPT

## 2022-04-12 PROCEDURE — 70450 CT HEAD/BRAIN W/O DYE: CPT | Mod: 26

## 2022-04-25 ENCOUNTER — APPOINTMENT (OUTPATIENT)
Dept: SURGERY | Facility: CLINIC | Age: 82
End: 2022-04-25
Payer: MEDICARE

## 2022-04-25 VITALS — TEMPERATURE: 98.2 F | BODY MASS INDEX: 26.31 KG/M2 | WEIGHT: 143 LBS | HEIGHT: 62 IN

## 2022-04-25 DIAGNOSIS — Z86.19 PERSONAL HISTORY OF OTHER INFECTIOUS AND PARASITIC DISEASES: ICD-10-CM

## 2022-04-25 DIAGNOSIS — Z80.6 FAMILY HISTORY OF LEUKEMIA: ICD-10-CM

## 2022-04-25 DIAGNOSIS — K57.32 DIVERTICULITIS OF LARGE INTESTINE W/OUT PERFORATION OR ABSCESS W/OUT BLEEDING: ICD-10-CM

## 2022-04-25 DIAGNOSIS — Z12.11 ENCOUNTER FOR SCREENING FOR MALIGNANT NEOPLASM OF COLON: ICD-10-CM

## 2022-04-25 DIAGNOSIS — Z87.19 PERSONAL HISTORY OF OTHER DISEASES OF THE DIGESTIVE SYSTEM: ICD-10-CM

## 2022-04-25 DIAGNOSIS — Z12.12 ENCOUNTER FOR SCREENING FOR MALIGNANT NEOPLASM OF COLON: ICD-10-CM

## 2022-04-25 PROCEDURE — 99214 OFFICE O/P EST MOD 30 MIN: CPT

## 2022-04-25 RX ORDER — ERYTHROMYCIN 5 MG/G
5 OINTMENT OPHTHALMIC
Qty: 3 | Refills: 0 | Status: ACTIVE | COMMUNITY
Start: 2021-11-15

## 2022-04-25 RX ORDER — NYSTATIN AND TRIAMCINOLONE ACETONIDE 100000; 1 MG/G; MG/G
100000-0.1 CREAM TOPICAL
Qty: 60 | Refills: 0 | Status: ACTIVE | COMMUNITY
Start: 2022-04-09

## 2022-04-25 RX ORDER — EZETIMIBE 10 MG/1
10 TABLET ORAL
Qty: 90 | Refills: 0 | Status: ACTIVE | COMMUNITY
Start: 2021-11-26

## 2022-04-25 NOTE — ASSESSMENT
[FreeTextEntry1] : Long discussion regarding all options and risks\par Bowel prep written and explained\par Scheduled for colonoscopy on 5/17/22\par Discussed with Medicine\par All lab values and imaging studies reviewed

## 2022-04-25 NOTE — HISTORY OF PRESENT ILLNESS
[de-identified] : The patient is still experiencing lower abdominal pain. She has been eating well and denies any change in bowel habits.

## 2022-04-25 NOTE — PHYSICAL EXAM
[Normal Breath Sounds] : Normal breath sounds [Normal Heart Sounds] : normal heart sounds [Normal Rate and Rhythm] : normal rate and rhythm [Abdominal Masses] : Abdominal mass present [Abdomen Tenderness] : ~T ~M No abdominal tenderness [No Rash or Lesion] : No rash or lesion [de-identified] : nl [de-identified] : nl [de-identified] : RLQ lobular mass [de-identified] : nl

## 2022-05-13 ENCOUNTER — OUTPATIENT (OUTPATIENT)
Dept: OUTPATIENT SERVICES | Facility: HOSPITAL | Age: 82
LOS: 1 days | End: 2022-05-13
Payer: COMMERCIAL

## 2022-05-13 DIAGNOSIS — Z98.89 OTHER SPECIFIED POSTPROCEDURAL STATES: Chronic | ICD-10-CM

## 2022-05-13 DIAGNOSIS — Z20.828 CONTACT WITH AND (SUSPECTED) EXPOSURE TO OTHER VIRAL COMMUNICABLE DISEASES: ICD-10-CM

## 2022-05-13 DIAGNOSIS — Z90.710 ACQUIRED ABSENCE OF BOTH CERVIX AND UTERUS: Chronic | ICD-10-CM

## 2022-05-13 LAB — SARS-COV-2 RNA SPEC QL NAA+PROBE: SIGNIFICANT CHANGE UP

## 2022-05-13 PROCEDURE — U0003: CPT

## 2022-05-13 PROCEDURE — U0005: CPT

## 2022-05-16 ENCOUNTER — NON-APPOINTMENT (OUTPATIENT)
Age: 82
End: 2022-05-16

## 2022-05-16 ENCOUNTER — APPOINTMENT (OUTPATIENT)
Dept: RADIOLOGY | Facility: HOSPITAL | Age: 82
End: 2022-05-16
Payer: MEDICARE

## 2022-05-16 ENCOUNTER — TRANSCRIPTION ENCOUNTER (OUTPATIENT)
Age: 82
End: 2022-05-16

## 2022-05-16 ENCOUNTER — OUTPATIENT (OUTPATIENT)
Dept: OUTPATIENT SERVICES | Facility: HOSPITAL | Age: 82
LOS: 1 days | End: 2022-05-16
Payer: COMMERCIAL

## 2022-05-16 ENCOUNTER — APPOINTMENT (OUTPATIENT)
Dept: CARDIOLOGY | Facility: CLINIC | Age: 82
End: 2022-05-16
Payer: MEDICARE

## 2022-05-16 VITALS
SYSTOLIC BLOOD PRESSURE: 177 MMHG | WEIGHT: 139 LBS | HEIGHT: 62 IN | OXYGEN SATURATION: 97 % | DIASTOLIC BLOOD PRESSURE: 79 MMHG | RESPIRATION RATE: 16 BRPM | HEART RATE: 91 BPM | TEMPERATURE: 97.4 F | BODY MASS INDEX: 25.58 KG/M2

## 2022-05-16 DIAGNOSIS — Z98.89 OTHER SPECIFIED POSTPROCEDURAL STATES: Chronic | ICD-10-CM

## 2022-05-16 DIAGNOSIS — Z00.8 ENCOUNTER FOR OTHER GENERAL EXAMINATION: ICD-10-CM

## 2022-05-16 DIAGNOSIS — Z90.710 ACQUIRED ABSENCE OF BOTH CERVIX AND UTERUS: Chronic | ICD-10-CM

## 2022-05-16 PROCEDURE — 71046 X-RAY EXAM CHEST 2 VIEWS: CPT

## 2022-05-16 PROCEDURE — 93000 ELECTROCARDIOGRAM COMPLETE: CPT

## 2022-05-16 PROCEDURE — 71046 X-RAY EXAM CHEST 2 VIEWS: CPT | Mod: 26

## 2022-05-16 PROCEDURE — 99215 OFFICE O/P EST HI 40 MIN: CPT

## 2022-05-17 ENCOUNTER — OUTPATIENT (OUTPATIENT)
Dept: OUTPATIENT SERVICES | Facility: HOSPITAL | Age: 82
LOS: 1 days | End: 2022-05-17
Payer: COMMERCIAL

## 2022-05-17 ENCOUNTER — APPOINTMENT (OUTPATIENT)
Dept: SURGERY | Facility: HOSPITAL | Age: 82
End: 2022-05-17

## 2022-05-17 DIAGNOSIS — K57.30 DIVERTICULOSIS OF LARGE INTESTINE WITHOUT PERFORATION OR ABSCESS WITHOUT BLEEDING: ICD-10-CM

## 2022-05-17 DIAGNOSIS — Z86.010 PERSONAL HISTORY OF COLONIC POLYPS: ICD-10-CM

## 2022-05-17 DIAGNOSIS — Z90.710 ACQUIRED ABSENCE OF BOTH CERVIX AND UTERUS: Chronic | ICD-10-CM

## 2022-05-17 DIAGNOSIS — Z98.89 OTHER SPECIFIED POSTPROCEDURAL STATES: Chronic | ICD-10-CM

## 2022-05-17 DIAGNOSIS — Z12.11 ENCOUNTER FOR SCREENING FOR MALIGNANT NEOPLASM OF COLON: ICD-10-CM

## 2022-05-17 PROCEDURE — 45378 DIAGNOSTIC COLONOSCOPY: CPT | Mod: 74

## 2022-05-17 PROCEDURE — G0105: CPT | Mod: 53

## 2022-05-17 RX ORDER — SODIUM CHLORIDE 9 MG/ML
500 INJECTION INTRAMUSCULAR; INTRAVENOUS; SUBCUTANEOUS
Refills: 0 | Status: COMPLETED | OUTPATIENT
Start: 2022-05-17 | End: 2022-05-17

## 2022-05-17 RX ADMIN — SODIUM CHLORIDE 75 MILLILITER(S): 9 INJECTION INTRAMUSCULAR; INTRAVENOUS; SUBCUTANEOUS at 12:54

## 2022-05-26 ENCOUNTER — APPOINTMENT (OUTPATIENT)
Dept: CT IMAGING | Facility: CLINIC | Age: 82
End: 2022-05-26

## 2022-07-15 ENCOUNTER — EMERGENCY (EMERGENCY)
Facility: HOSPITAL | Age: 82
LOS: 1 days | Discharge: HOME | End: 2022-07-15
Attending: STUDENT IN AN ORGANIZED HEALTH CARE EDUCATION/TRAINING PROGRAM | Admitting: INTERNAL MEDICINE
Payer: COMMERCIAL

## 2022-07-15 VITALS
WEIGHT: 136.03 LBS | HEART RATE: 50 BPM | RESPIRATION RATE: 16 BRPM | SYSTOLIC BLOOD PRESSURE: 163 MMHG | TEMPERATURE: 98 F | HEIGHT: 60 IN | OXYGEN SATURATION: 96 % | DIASTOLIC BLOOD PRESSURE: 71 MMHG

## 2022-07-15 DIAGNOSIS — Z98.89 OTHER SPECIFIED POSTPROCEDURAL STATES: Chronic | ICD-10-CM

## 2022-07-15 DIAGNOSIS — Z90.710 ACQUIRED ABSENCE OF BOTH CERVIX AND UTERUS: Chronic | ICD-10-CM

## 2022-07-15 PROCEDURE — 99284 EMERGENCY DEPT VISIT MOD MDM: CPT

## 2022-07-15 PROCEDURE — 99282 EMERGENCY DEPT VISIT SF MDM: CPT

## 2022-07-15 NOTE — ED PROVIDER NOTE - PATIENT PORTAL LINK FT
You can access the FollowMyHealth Patient Portal offered by [facility] by registering at the following website: [email or web address]. By joining [employer]’s FollowMyHealth portal, you will also be able to view your health information using other applications (apps) compatible with our system.

## 2022-07-15 NOTE — ED ADULT NURSE NOTE - NSIMPLEMENTINTERV_GEN_ALL_ED
Implemented All Fall Risk Interventions:  [place] to call system. Call bell, personal items and telephone within reach. Instruct patient to call for assistance. Room bathroom lighting operational. Non-slip footwear when patient is off stretcher. Physically safe environment: no spills, clutter or unnecessary equipment. Stretcher in lowest position, wheels locked, appropriate side rails in place. Provide visual cue, wrist band, yellow gown, etc. Monitor gait and stability. Monitor for mental status changes and reorient to person, place, and time. Review medications for side effects contributing to fall risk. Reinforce activity limits and safety measures with patient and family.

## 2022-07-15 NOTE — ED PROVIDER NOTE - PHYSICAL EXAMINATION
Gen: Well appearing in NAD   Head: NC/AT  Neck: trachea midline  Resp:  No distress  Ext: no deformities  Neuro:  A&O appears non focal  Skin:  Warm and dry as visualized  Psych:  + anxious

## 2022-07-15 NOTE — ED ADULT NURSE NOTE - OBJECTIVE STATEMENT
Presents to ED reports taking prep for colonography, brought in by son because she is nervous about the diarrhea she is having. Per protocol, pt has taken mag citrate x2. No abd pain.

## 2022-07-15 NOTE — ED PROVIDER NOTE - OBJECTIVE STATEMENT
83 yo taking prep for colonography, brought in by son daniel she is nervous about the diarrhea she is having. Per protocol, pt has taken mag citrate x2. No abd pain. 83 yo F hx of HTN, brought in by private vehicle for diarrhea. Per son at bedside, pt has started taking GI prep for scheduled colonography. First two doses are magnesium citrate, after which pt has been having diarrhea. Pt seen in room on commode, anxious appearing, repeatedly asks if she has to continue to follow the protocol, next of which is barium contrast. In the ED, pt well appearing, denies vomiting, is tolerating PO per son, no abdominal pain, no lightheadedness, dizziness, palpitations. Pt complains of dry mouth and bad taste in mouth.

## 2022-07-15 NOTE — ED PROVIDER NOTE - NSFOLLOWUPINSTRUCTIONS_ED_ALL_ED_FT
** Continue to complete your bowel prep as directed.     ** Go to the nearest Emergency Department if you experience any new or concerning symptoms, such as:   - blood in your stool  - severe abdominal pain  - dizziness, passing out  - fever, chills

## 2022-07-15 NOTE — ED ADULT NURSE NOTE - COMFORT/ACCEPTABLE PAIN LEVEL (0-10)
PROCEDURES:  Insertion, catheter, central venous, tunneled, with cuff, adult 21-Mar-2022 16:33:52  Vincent Grimes  Moderate conscious sedation by same physician doing procedure in patient more than 5 years old 21-Mar-2022 16:35:34  Vincent Grimes  
2

## 2022-07-15 NOTE — ED PROVIDER NOTE - CLINICAL SUMMARY MEDICAL DECISION MAKING FREE TEXT BOX
83 yo F hx of HTN, brought in by private vehicle for diarrhea. Per son at bedside, pt has started taking GI prep for scheduled colonography. First two doses are magnesium citrate, after which pt has been having a few episodes of NBNB diarrhea. Pt well appearing, denies vomiting, is tolerating PO per son, no abdominal pain, no lightheadedness, dizziness, palpitations. Pt complains of dry mouth and bad taste in mouth. Exam as noted. ddx: intended effect of xenobiotic, low suspicion for infectious diarrhea given onset after starting bowl prep. Anticipate DC home.

## 2022-07-16 ENCOUNTER — OUTPATIENT (OUTPATIENT)
Dept: OUTPATIENT SERVICES | Facility: HOSPITAL | Age: 82
LOS: 1 days | End: 2022-07-16
Payer: COMMERCIAL

## 2022-07-16 ENCOUNTER — APPOINTMENT (OUTPATIENT)
Dept: CT IMAGING | Facility: CLINIC | Age: 82
End: 2022-07-16

## 2022-07-16 ENCOUNTER — RESULT REVIEW (OUTPATIENT)
Age: 82
End: 2022-07-16

## 2022-07-16 DIAGNOSIS — Z98.89 OTHER SPECIFIED POSTPROCEDURAL STATES: Chronic | ICD-10-CM

## 2022-07-16 DIAGNOSIS — Z90.710 ACQUIRED ABSENCE OF BOTH CERVIX AND UTERUS: Chronic | ICD-10-CM

## 2022-07-16 DIAGNOSIS — Z00.8 ENCOUNTER FOR OTHER GENERAL EXAMINATION: ICD-10-CM

## 2022-07-16 PROCEDURE — 74263 CT COLONOGRAPHY SCREENING: CPT | Mod: 26

## 2022-07-16 PROCEDURE — 74263 CT COLONOGRAPHY SCREENING: CPT

## 2022-07-23 NOTE — CHART NOTE - NSCHARTNOTEFT_GEN_A_CORE
Tatiana called pat to discuss and assist with follow up care.  Pt presented to ED on 7/15/22 due to abdominal pain.  Pt did not answer the call and there was no  message option.

## 2022-08-09 ENCOUNTER — APPOINTMENT (OUTPATIENT)
Dept: CARDIOLOGY | Facility: CLINIC | Age: 82
End: 2022-08-09

## 2022-08-09 ENCOUNTER — RX RENEWAL (OUTPATIENT)
Age: 82
End: 2022-08-09

## 2022-08-09 ENCOUNTER — NON-APPOINTMENT (OUTPATIENT)
Age: 82
End: 2022-08-09

## 2022-08-09 VITALS
HEIGHT: 62 IN | SYSTOLIC BLOOD PRESSURE: 168 MMHG | TEMPERATURE: 97.5 F | DIASTOLIC BLOOD PRESSURE: 73 MMHG | OXYGEN SATURATION: 98 % | WEIGHT: 136 LBS | HEART RATE: 111 BPM | RESPIRATION RATE: 16 BRPM | BODY MASS INDEX: 25.03 KG/M2

## 2022-08-09 PROCEDURE — 99215 OFFICE O/P EST HI 40 MIN: CPT | Mod: 25

## 2022-08-09 PROCEDURE — 93000 ELECTROCARDIOGRAM COMPLETE: CPT

## 2022-08-09 RX ORDER — ATENOLOL 50 MG/1
50 TABLET ORAL
Qty: 30 | Refills: 0 | Status: DISCONTINUED | COMMUNITY
Start: 2022-07-09

## 2022-08-12 ENCOUNTER — RX RENEWAL (OUTPATIENT)
Age: 82
End: 2022-08-12

## 2022-08-23 ENCOUNTER — OUTPATIENT (OUTPATIENT)
Dept: OUTPATIENT SERVICES | Facility: HOSPITAL | Age: 82
LOS: 1 days | End: 2022-08-23
Payer: COMMERCIAL

## 2022-08-23 ENCOUNTER — APPOINTMENT (OUTPATIENT)
Dept: RADIOLOGY | Facility: HOSPITAL | Age: 82
End: 2022-08-23

## 2022-08-23 DIAGNOSIS — Z98.89 OTHER SPECIFIED POSTPROCEDURAL STATES: Chronic | ICD-10-CM

## 2022-08-23 DIAGNOSIS — Z90.710 ACQUIRED ABSENCE OF BOTH CERVIX AND UTERUS: Chronic | ICD-10-CM

## 2022-08-23 DIAGNOSIS — Z00.8 ENCOUNTER FOR OTHER GENERAL EXAMINATION: ICD-10-CM

## 2022-08-23 PROCEDURE — 73610 X-RAY EXAM OF ANKLE: CPT | Mod: 26,50

## 2022-08-23 PROCEDURE — 73610 X-RAY EXAM OF ANKLE: CPT

## 2022-08-23 PROCEDURE — 73630 X-RAY EXAM OF FOOT: CPT

## 2022-08-23 PROCEDURE — 73630 X-RAY EXAM OF FOOT: CPT | Mod: 26,50

## 2022-08-23 PROCEDURE — 73562 X-RAY EXAM OF KNEE 3: CPT | Mod: 26,RT

## 2022-08-23 PROCEDURE — 73562 X-RAY EXAM OF KNEE 3: CPT

## 2022-09-09 ENCOUNTER — RESULT REVIEW (OUTPATIENT)
Age: 82
End: 2022-09-09

## 2022-09-15 ENCOUNTER — RX RENEWAL (OUTPATIENT)
Age: 82
End: 2022-09-15

## 2022-09-22 ENCOUNTER — NON-APPOINTMENT (OUTPATIENT)
Age: 82
End: 2022-09-22

## 2022-09-27 ENCOUNTER — APPOINTMENT (OUTPATIENT)
Dept: CARDIOLOGY | Facility: CLINIC | Age: 82
End: 2022-09-27

## 2022-09-27 ENCOUNTER — NON-APPOINTMENT (OUTPATIENT)
Age: 82
End: 2022-09-27

## 2022-09-27 VITALS
SYSTOLIC BLOOD PRESSURE: 164 MMHG | DIASTOLIC BLOOD PRESSURE: 66 MMHG | HEART RATE: 70 BPM | BODY MASS INDEX: 25.4 KG/M2 | RESPIRATION RATE: 19 BRPM | OXYGEN SATURATION: 98 % | TEMPERATURE: 96.5 F | WEIGHT: 138 LBS | HEIGHT: 62 IN

## 2022-09-27 PROCEDURE — 93306 TTE W/DOPPLER COMPLETE: CPT

## 2022-09-27 PROCEDURE — 99215 OFFICE O/P EST HI 40 MIN: CPT | Mod: 25

## 2022-09-27 PROCEDURE — 93000 ELECTROCARDIOGRAM COMPLETE: CPT

## 2023-03-07 RX ORDER — LABETALOL HYDROCHLORIDE 100 MG/1
100 TABLET, FILM COATED ORAL
Qty: 180 | Refills: 0 | Status: ACTIVE | COMMUNITY
Start: 2022-08-09 | End: 1900-01-01

## 2023-03-31 NOTE — PATIENT PROFILE ADULT - MONEY FOR FOOD
[facility] Transitions Initial Follow Up Call    Patient:  Tom Street Patient :  1966  MRN:  [id number]   Reason for Admission:   A-Fib  Discharge Date:  3/8/23  RARS: 0      Transitions of Care Initial Call    Was this an external facility discharge? no    Discharge Facility: [facility].      Challenges to be reviewed by the provider   Additional needs identified to be addressed with provider:    ian    AMB CC Provider Discharge Needs: none            CTC attempt to reach Pt regarding recent hospital discharge. CTC left voice recording with call back number requesting a call back. Follow up appointments:    Future Appointments   Date Time Provider Wil Chatterjee   2023  2:15 PM [facility]   5/10/2023  1:00 PM Adwoa Willson PSYD KEN[facility]  PSY [facility]   2023 11:30 AM [facility]  [place] [facility]   2023 11:00 AM Earline Lopez MD [facility] P/CC [facility]       REGULO Ray, RN  Care Transition Coordinator  Contact Number:  [phone] no

## 2023-04-12 ENCOUNTER — EMERGENCY (EMERGENCY)
Facility: HOSPITAL | Age: 83
LOS: 1 days | Discharge: HOME | End: 2023-04-12
Attending: EMERGENCY MEDICINE | Admitting: EMERGENCY MEDICINE
Payer: COMMERCIAL

## 2023-04-12 VITALS
RESPIRATION RATE: 16 BRPM | WEIGHT: 143.08 LBS | DIASTOLIC BLOOD PRESSURE: 68 MMHG | OXYGEN SATURATION: 97 % | HEART RATE: 92 BPM | TEMPERATURE: 99 F | HEIGHT: 63 IN | SYSTOLIC BLOOD PRESSURE: 145 MMHG

## 2023-04-12 VITALS
TEMPERATURE: 99 F | OXYGEN SATURATION: 97 % | HEART RATE: 85 BPM | DIASTOLIC BLOOD PRESSURE: 74 MMHG | RESPIRATION RATE: 17 BRPM | SYSTOLIC BLOOD PRESSURE: 138 MMHG

## 2023-04-12 DIAGNOSIS — Z90.710 ACQUIRED ABSENCE OF BOTH CERVIX AND UTERUS: Chronic | ICD-10-CM

## 2023-04-12 DIAGNOSIS — Z98.89 OTHER SPECIFIED POSTPROCEDURAL STATES: Chronic | ICD-10-CM

## 2023-04-12 LAB
ALBUMIN SERPL ELPH-MCNC: 3.7 G/DL — SIGNIFICANT CHANGE UP (ref 3.3–5)
ALP SERPL-CCNC: 84 U/L — SIGNIFICANT CHANGE UP (ref 40–120)
ALT FLD-CCNC: 21 U/L — SIGNIFICANT CHANGE UP (ref 10–45)
ANION GAP SERPL CALC-SCNC: 8 MMOL/L — SIGNIFICANT CHANGE UP (ref 5–17)
APPEARANCE UR: CLEAR — SIGNIFICANT CHANGE UP
AST SERPL-CCNC: 25 U/L — SIGNIFICANT CHANGE UP (ref 10–40)
BACTERIA # UR AUTO: NEGATIVE /HPF — SIGNIFICANT CHANGE UP
BASOPHILS # BLD AUTO: 0.03 K/UL — SIGNIFICANT CHANGE UP (ref 0–0.2)
BASOPHILS NFR BLD AUTO: 0.4 % — SIGNIFICANT CHANGE UP (ref 0–2)
BILIRUB SERPL-MCNC: 0.7 MG/DL — SIGNIFICANT CHANGE UP (ref 0.2–1.2)
BILIRUB UR-MCNC: NEGATIVE — SIGNIFICANT CHANGE UP
BUN SERPL-MCNC: 28 MG/DL — HIGH (ref 7–23)
CALCIUM SERPL-MCNC: 9.5 MG/DL — SIGNIFICANT CHANGE UP (ref 8.4–10.5)
CHLORIDE SERPL-SCNC: 100 MMOL/L — SIGNIFICANT CHANGE UP (ref 96–108)
CO2 SERPL-SCNC: 30 MMOL/L — SIGNIFICANT CHANGE UP (ref 22–31)
COLOR SPEC: YELLOW — SIGNIFICANT CHANGE UP
CREAT SERPL-MCNC: 1.03 MG/DL — SIGNIFICANT CHANGE UP (ref 0.5–1.3)
DIFF PNL FLD: NEGATIVE — SIGNIFICANT CHANGE UP
EGFR: 54 ML/MIN/1.73M2 — LOW
EOSINOPHIL # BLD AUTO: 0.12 K/UL — SIGNIFICANT CHANGE UP (ref 0–0.5)
EOSINOPHIL NFR BLD AUTO: 1.6 % — SIGNIFICANT CHANGE UP (ref 0–6)
EPI CELLS # UR: SIGNIFICANT CHANGE UP
GLUCOSE SERPL-MCNC: 128 MG/DL — HIGH (ref 70–99)
GLUCOSE UR QL: NEGATIVE — SIGNIFICANT CHANGE UP
HCT VFR BLD CALC: 37.7 % — SIGNIFICANT CHANGE UP (ref 34.5–45)
HGB BLD-MCNC: 13.3 G/DL — SIGNIFICANT CHANGE UP (ref 11.5–15.5)
IMM GRANULOCYTES NFR BLD AUTO: 0.5 % — SIGNIFICANT CHANGE UP (ref 0–0.9)
KETONES UR-MCNC: NEGATIVE — SIGNIFICANT CHANGE UP
LEUKOCYTE ESTERASE UR-ACNC: ABNORMAL
LIDOCAIN IGE QN: 100 U/L — SIGNIFICANT CHANGE UP (ref 73–393)
LYMPHOCYTES # BLD AUTO: 1.42 K/UL — SIGNIFICANT CHANGE UP (ref 1–3.3)
LYMPHOCYTES # BLD AUTO: 19 % — SIGNIFICANT CHANGE UP (ref 13–44)
MCHC RBC-ENTMCNC: 32.1 PG — SIGNIFICANT CHANGE UP (ref 27–34)
MCHC RBC-ENTMCNC: 35.3 GM/DL — SIGNIFICANT CHANGE UP (ref 32–36)
MCV RBC AUTO: 91.1 FL — SIGNIFICANT CHANGE UP (ref 80–100)
MONOCYTES # BLD AUTO: 0.67 K/UL — SIGNIFICANT CHANGE UP (ref 0–0.9)
MONOCYTES NFR BLD AUTO: 8.9 % — SIGNIFICANT CHANGE UP (ref 2–14)
NEUTROPHILS # BLD AUTO: 5.21 K/UL — SIGNIFICANT CHANGE UP (ref 1.8–7.4)
NEUTROPHILS NFR BLD AUTO: 69.6 % — SIGNIFICANT CHANGE UP (ref 43–77)
NITRITE UR-MCNC: NEGATIVE — SIGNIFICANT CHANGE UP
NRBC # BLD: 0 /100 WBCS — SIGNIFICANT CHANGE UP (ref 0–0)
PH UR: 8 — SIGNIFICANT CHANGE UP (ref 5–8)
PLATELET # BLD AUTO: 236 K/UL — SIGNIFICANT CHANGE UP (ref 150–400)
POTASSIUM SERPL-MCNC: 3.8 MMOL/L — SIGNIFICANT CHANGE UP (ref 3.5–5.3)
POTASSIUM SERPL-SCNC: 3.8 MMOL/L — SIGNIFICANT CHANGE UP (ref 3.5–5.3)
PROT SERPL-MCNC: 7.3 G/DL — SIGNIFICANT CHANGE UP (ref 6–8.3)
PROT UR-MCNC: NEGATIVE — SIGNIFICANT CHANGE UP
RBC # BLD: 4.14 M/UL — SIGNIFICANT CHANGE UP (ref 3.8–5.2)
RBC # FLD: 12 % — SIGNIFICANT CHANGE UP (ref 10.3–14.5)
RBC CASTS # UR COMP ASSIST: NEGATIVE /HPF — SIGNIFICANT CHANGE UP (ref 0–4)
SODIUM SERPL-SCNC: 138 MMOL/L — SIGNIFICANT CHANGE UP (ref 135–145)
SP GR SPEC: 1.01 — SIGNIFICANT CHANGE UP (ref 1.01–1.02)
UROBILINOGEN FLD QL: NEGATIVE — SIGNIFICANT CHANGE UP
WBC # BLD: 7.49 K/UL — SIGNIFICANT CHANGE UP (ref 3.8–10.5)
WBC # FLD AUTO: 7.49 K/UL — SIGNIFICANT CHANGE UP (ref 3.8–10.5)
WBC UR QL: SIGNIFICANT CHANGE UP /HPF (ref 0–5)

## 2023-04-12 PROCEDURE — 87086 URINE CULTURE/COLONY COUNT: CPT

## 2023-04-12 PROCEDURE — 99284 EMERGENCY DEPT VISIT MOD MDM: CPT | Mod: 25

## 2023-04-12 PROCEDURE — 99285 EMERGENCY DEPT VISIT HI MDM: CPT

## 2023-04-12 PROCEDURE — 74177 CT ABD & PELVIS W/CONTRAST: CPT | Mod: MA

## 2023-04-12 PROCEDURE — 85025 COMPLETE CBC W/AUTO DIFF WBC: CPT

## 2023-04-12 PROCEDURE — 36415 COLL VENOUS BLD VENIPUNCTURE: CPT

## 2023-04-12 PROCEDURE — 81001 URINALYSIS AUTO W/SCOPE: CPT

## 2023-04-12 PROCEDURE — 96360 HYDRATION IV INFUSION INIT: CPT | Mod: XU

## 2023-04-12 PROCEDURE — 74177 CT ABD & PELVIS W/CONTRAST: CPT | Mod: 26,MA

## 2023-04-12 PROCEDURE — 83690 ASSAY OF LIPASE: CPT

## 2023-04-12 PROCEDURE — 80053 COMPREHEN METABOLIC PANEL: CPT

## 2023-04-12 RX ORDER — SODIUM CHLORIDE 9 MG/ML
1000 INJECTION INTRAMUSCULAR; INTRAVENOUS; SUBCUTANEOUS ONCE
Refills: 0 | Status: COMPLETED | OUTPATIENT
Start: 2023-04-12 | End: 2023-04-12

## 2023-04-12 RX ADMIN — SODIUM CHLORIDE 1000 MILLILITER(S): 9 INJECTION INTRAMUSCULAR; INTRAVENOUS; SUBCUTANEOUS at 14:40

## 2023-04-12 NOTE — ED PROVIDER NOTE - PATIENT PORTAL LINK FT
You can access the FollowMyHealth Patient Portal offered by [facility] by registering at the following website: [email or web address]. By joining [employer]’s FollowMyHealth portal, you will also be able to view your health information using other applications (apps) compatible with our system.

## 2023-04-12 NOTE — ED ADULT NURSE NOTE - OBJECTIVE STATEMENT
Pt BIB EMS from home with c/o abdominal pain x 3 days. Pt with hx HTN and osteoarthritis. Pt states that in the past shes had abdominal pain that feels similar to this, and it normally just goes away, but this time, it hasn't. Pt denies n/v/d or fever/chills. Pts last BM was this AM.

## 2023-04-12 NOTE — ED PROVIDER NOTE - ATTENDING APP SHARED VISIT CONTRIBUTION OF CARE
Michele with KAREY Laura. 83 yr old female with hx of HTN, osteoarthritis presents to ED c/o right lower abdominal pain x 3 days. Pt reports last normal BM this am. Denies any nausea, vomiting, fever, chills, chest pain, sob or any other symptoms. No urinary complaints.    RLQ tenderness on exam   will check labs, UA, CT and re eval    Results WNL. Stable for dc.   Results discussed with patient.    I performed a face to face bedside interview with patient regarding history of present illness, review of symptoms and past medical history. I completed an independent physical exam.  I have discussed the patient's plan of care with Physician Assistant (PA). I agree with note as stated above, having amended the EMR as needed to reflect my findings.   This includes History of Present Illness, HIV, Past Medical/Surgical/Family/Social History, Allergies and Home Medications, Review of Systems, Physical Exam, and any Progress Notes during the time I functioned as the attending physician for this patient.

## 2023-04-12 NOTE — ED PROVIDER NOTE - CARE PROVIDER_API CALL
Skip Godwin (DO)  Internal Medicine  [address] [id number]  Phone: [phone]  Fax: [phone]  Follow Up Time:

## 2023-04-12 NOTE — ED PROVIDER NOTE - OBJECTIVE STATEMENT
83 yr old female with hx of HTN, osteoarthritis presents to ED c/o right lower abdominal pain x 3 days. Pt reports last normal BM this am. Denies any nausea, vomiting, fever, chills, chest pain, sob or any other symptoms. No urinary complaints.

## 2023-04-12 NOTE — ED PROVIDER NOTE - CLINICAL SUMMARY MEDICAL DECISION MAKING FREE TEXT BOX
83 yr old female with hx of HTN, osteoarthritis presents to ED c/o right lower abdominal pain x 3 days. Pt reports last normal BM this am. Denies any nausea, vomiting, fever, chills, chest pain, sob or any other symptoms. No urinary complaints.    RLQ tenderness on exam   will check labs, UA, CT and re eval 83 yr old female with hx of HTN, osteoarthritis presents to ED c/o right lower abdominal pain x 3 days. Pt reports last normal BM this am. Denies any nausea, vomiting, fever, chills, chest pain, sob or any other symptoms. No urinary complaints.    RLQ tenderness on exam   will check labs, UA, CT and re eval    Results WNL. Stable for dc.   Results discussed with patient.

## 2023-04-12 NOTE — ED ADULT NURSE NOTE - NSIMPLEMENTINTERV_GEN_ALL_ED
Implemented All Fall with Harm Risk Interventions:  [place] to call system. Call bell, personal items and telephone within reach. Instruct patient to call for assistance. Room bathroom lighting operational. Non-slip footwear when patient is off stretcher. Physically safe environment: no spills, clutter or unnecessary equipment. Stretcher in lowest position, wheels locked, appropriate side rails in place. Provide visual cue, wrist band, yellow gown, etc. Monitor gait and stability. Monitor for mental status changes and reorient to person, place, and time. Review medications for side effects contributing to fall risk. Reinforce activity limits and safety measures with patient and family. Provide visual clues: red socks.

## 2023-04-12 NOTE — ED PROVIDER NOTE - NSFOLLOWUPINSTRUCTIONS_ED_ALL_ED_FT
Abdominal Pain    Many things can cause abdominal pain. Many times, abdominal pain is not caused by a disease and will improve without treatment. Your health care provider will do a physical exam to determine if there is a dangerous cause of your pain; blood tests and imaging may help determine the cause of your pain. However, in many cases, no cause may be found and you may need further testing as an outpatient. Monitor your abdominal pain for any changes.      Follow up with Dr Godwin.     SEEK IMMEDIATE MEDICAL CARE IF YOU HAVE ANY OF THE FOLLOWING SYMPTOMS: worsening abdominal pain, uncontrollable vomiting, profuse diarrhea, inability to have bowel movements or pass gas, black or bloody stools, fever accompanying chest pain or back pain, or fainting. These symptoms may represent a serious problem that is an emergency. Do not wait to see if the symptoms will go away. Get medical help right away. Call 911 and do not drive yourself to the hospital.

## 2023-04-13 LAB
CULTURE RESULTS: SIGNIFICANT CHANGE UP
SPECIMEN SOURCE: SIGNIFICANT CHANGE UP

## 2023-04-16 NOTE — CHART NOTE - NSCHARTNOTEFT_GEN_A_CORE
Pt is an 82 y/o female presented to ED for abdominal pain. As per [facility], pt has scheduled Gastroenterology appt with Josef Glaser on 5/12/23

## 2023-04-22 ENCOUNTER — NON-APPOINTMENT (OUTPATIENT)
Age: 83
End: 2023-04-22

## 2023-05-12 ENCOUNTER — APPOINTMENT (OUTPATIENT)
Dept: GASTROENTEROLOGY | Facility: CLINIC | Age: 83
End: 2023-05-12
Payer: MEDICARE

## 2023-05-12 VITALS
TEMPERATURE: 97.7 F | HEART RATE: 88 BPM | HEIGHT: 62 IN | BODY MASS INDEX: 25.14 KG/M2 | OXYGEN SATURATION: 99 % | SYSTOLIC BLOOD PRESSURE: 154 MMHG | WEIGHT: 136.6 LBS | DIASTOLIC BLOOD PRESSURE: 72 MMHG

## 2023-05-12 DIAGNOSIS — M54.16 RADICULOPATHY, LUMBAR REGION: ICD-10-CM

## 2023-05-12 DIAGNOSIS — Z80.0 FAMILY HISTORY OF MALIGNANT NEOPLASM OF DIGESTIVE ORGANS: ICD-10-CM

## 2023-05-12 DIAGNOSIS — K57.90 DIVERTICULOSIS OF INTESTINE, PART UNSPECIFIED, W/OUT PERFORATION OR ABSCESS W/OUT BLEEDING: ICD-10-CM

## 2023-05-12 DIAGNOSIS — R10.9 UNSPECIFIED ABDOMINAL PAIN: ICD-10-CM

## 2023-05-12 PROCEDURE — 99204 OFFICE O/P NEW MOD 45 MIN: CPT

## 2023-05-12 NOTE — PHYSICAL EXAM
[Alert] : alert [No Acute Distress] : no acute distress [No Respiratory Distress] : no respiratory distress [Auscultation Breath Sounds / Voice Sounds] : lungs were clear to auscultation bilaterally [Heart Rate And Rhythm] : heart rate was normal and rhythm regular [Murmurs] : no murmurs [Bowel Sounds] : normal bowel sounds [Abdomen Soft] : soft [] : no hepatosplenomegaly [de-identified] : Right lower quadrant incision was appreciated with some nodularity likely consistent with suture granulomas or possible underlying mesh.  It is not significantly tender on palpation. [de-identified] : Palpation of the lumbar spine did elicit mild discomfort.  The patient also was rate able to reproduce the anterior abdominal wall discomfort by twisting and bending.

## 2023-05-12 NOTE — HISTORY OF PRESENT ILLNESS
[FreeTextEntry1] : I saw patient Abdon Rubin in the office today.  The patient is an 83-year-old female with several comorbid conditions.  The patient has a history of hypertension and hyperlipidemia.  There is no history of diabetes or coronary artery disease. The patient's appetite is fairly good with no dysphagia or unexplained weight loss.  The patient's bowel movements are sometimes erratic and she is occasionally constipated.  She will take fiber and increase her water intake in this regard.  The patient has significant cervical and lumbar radiculopathy with arthritic changes documented by scans.  The patient has daily discomfort in the lower back and there is radiation to the anterior abdomen as well as to the right groin area and down the leg.  The patient states that the discomfort has escalated to the point where she is starting to do some decreased power in the right leg.  The patient has an ill-defined chronic right-sided discomfort which is not related to eating fasting or moving her bowels.  This also appears to be positional in nature but does correspond to a previous incision where the patient had hernia repair.  The patient does not drink caffeine or ethanol and she does not smoke.  The patient had a history of a diverticular bleed in the past and has had colonoscopic examinations.  The patient has also had a virtual colonoscopy in the past.  Most recently, because of her pain the patient went to the emergency room.  The patient had a CAT scan of the abdomen and pelvis which did not reveal any significant intra-abdominal pathology with the exception of the diverticulosis.  It did confirm significant arthritic changes of the lumbar spine.  Several years prior the patient underwent a CAT scan of the abdomen and pelvis and there was a mention of a pancreatic cyst.  This was not evidenced on her most recent CAT scan.  Patient was accompanied by her son.

## 2023-05-12 NOTE — ASSESSMENT
[FreeTextEntry1] : The patient is an 83-year-old female with several medical issues.  From the gastrointestinal perspective the patient has  diverticulosis and will continue to take fiber and increase her water intake.  I feel that the patient's "abdominal" discomfort is purely referred pain from her significant spinal issues.  The patient and her son stated that she will be going to see the doctor next week for possible epidural injections.  The patient did have a history of a pancreatic cyst which apparently was not present on the most recent CAT scan.  Even if it is present, but was not detected on the most recent CAT scan,  I do not feel that any further evaluation is indicated.  The original cyst was a simple cyst and did not appear to communicate with the pancreatic duct.  Ongoing surveillance for the cyst is not indicated.  The patient was told to call me after she has her epidural injection to see if the anterior abdominal wall discomfort improves.

## 2023-05-12 NOTE — REVIEW OF SYSTEMS
[Fever] : no fever [Chills] : no chills [Feeling Poorly] : feeling poorly [Recent Weight Loss (___ Lbs)] : no recent weight loss [Chest Pain] : no chest pain [Palpitations] : no palpitations [SOB on Exertion] : no shortness of breath during exertion [Constipation] : constipation [Diarrhea] : no diarrhea [Heartburn] : no heartburn [Melena (black stool)] : no melena [Bloating (gassiness)] : no bloating [As Noted in HPI] : as noted in HPI [FreeTextEntry9] : Significant lower back pain

## 2023-06-30 ENCOUNTER — EMERGENCY (EMERGENCY)
Facility: HOSPITAL | Age: 83
LOS: 1 days | Discharge: HOME | End: 2023-06-30
Attending: INTERNAL MEDICINE | Admitting: INTERNAL MEDICINE
Payer: COMMERCIAL

## 2023-06-30 VITALS
TEMPERATURE: 98 F | OXYGEN SATURATION: 99 % | HEART RATE: 68 BPM | WEIGHT: 139.11 LBS | DIASTOLIC BLOOD PRESSURE: 78 MMHG | HEIGHT: 63 IN | RESPIRATION RATE: 18 BRPM | SYSTOLIC BLOOD PRESSURE: 144 MMHG

## 2023-06-30 DIAGNOSIS — Z98.89 OTHER SPECIFIED POSTPROCEDURAL STATES: Chronic | ICD-10-CM

## 2023-06-30 DIAGNOSIS — Z90.710 ACQUIRED ABSENCE OF BOTH CERVIX AND UTERUS: Chronic | ICD-10-CM

## 2023-06-30 PROCEDURE — 99285 EMERGENCY DEPT VISIT HI MDM: CPT

## 2023-06-30 PROCEDURE — 93010 ELECTROCARDIOGRAM REPORT: CPT

## 2023-06-30 RX ORDER — SODIUM CHLORIDE 9 MG/ML
1000 INJECTION INTRAMUSCULAR; INTRAVENOUS; SUBCUTANEOUS ONCE
Refills: 0 | Status: COMPLETED | OUTPATIENT
Start: 2023-06-30 | End: 2023-06-30

## 2023-07-01 VITALS
HEART RATE: 78 BPM | DIASTOLIC BLOOD PRESSURE: 78 MMHG | TEMPERATURE: 98 F | SYSTOLIC BLOOD PRESSURE: 132 MMHG | RESPIRATION RATE: 20 BRPM | OXYGEN SATURATION: 98 %

## 2023-07-01 LAB
ALBUMIN SERPL ELPH-MCNC: 3.4 G/DL — SIGNIFICANT CHANGE UP (ref 3.3–5)
ALP SERPL-CCNC: 73 U/L — SIGNIFICANT CHANGE UP (ref 40–120)
ALT FLD-CCNC: 22 U/L — SIGNIFICANT CHANGE UP (ref 10–45)
ANION GAP SERPL CALC-SCNC: 10 MMOL/L — SIGNIFICANT CHANGE UP (ref 5–17)
APPEARANCE UR: CLEAR — SIGNIFICANT CHANGE UP
AST SERPL-CCNC: 25 U/L — SIGNIFICANT CHANGE UP (ref 10–40)
BACTERIA # UR AUTO: NEGATIVE /HPF — SIGNIFICANT CHANGE UP
BASOPHILS # BLD AUTO: 0.03 K/UL — SIGNIFICANT CHANGE UP (ref 0–0.2)
BASOPHILS NFR BLD AUTO: 0.2 % — SIGNIFICANT CHANGE UP (ref 0–2)
BILIRUB SERPL-MCNC: 1.2 MG/DL — SIGNIFICANT CHANGE UP (ref 0.2–1.2)
BILIRUB UR-MCNC: NEGATIVE — SIGNIFICANT CHANGE UP
BUN SERPL-MCNC: 31 MG/DL — HIGH (ref 7–23)
CALCIUM SERPL-MCNC: 8.8 MG/DL — SIGNIFICANT CHANGE UP (ref 8.4–10.5)
CHLORIDE SERPL-SCNC: 93 MMOL/L — LOW (ref 96–108)
CO2 SERPL-SCNC: 25 MMOL/L — SIGNIFICANT CHANGE UP (ref 22–31)
COLOR SPEC: YELLOW — SIGNIFICANT CHANGE UP
CREAT SERPL-MCNC: 0.88 MG/DL — SIGNIFICANT CHANGE UP (ref 0.5–1.3)
DIFF PNL FLD: NEGATIVE — SIGNIFICANT CHANGE UP
EGFR: 64 ML/MIN/1.73M2 — SIGNIFICANT CHANGE UP
EOSINOPHIL # BLD AUTO: 0.06 K/UL — SIGNIFICANT CHANGE UP (ref 0–0.5)
EOSINOPHIL NFR BLD AUTO: 0.4 % — SIGNIFICANT CHANGE UP (ref 0–6)
EPI CELLS # UR: 0 — SIGNIFICANT CHANGE UP
GLUCOSE SERPL-MCNC: 105 MG/DL — HIGH (ref 70–99)
GLUCOSE UR QL: NEGATIVE MG/DL — SIGNIFICANT CHANGE UP
HCT VFR BLD CALC: 34.6 % — SIGNIFICANT CHANGE UP (ref 34.5–45)
HGB BLD-MCNC: 12.3 G/DL — SIGNIFICANT CHANGE UP (ref 11.5–15.5)
IMM GRANULOCYTES NFR BLD AUTO: 0.5 % — SIGNIFICANT CHANGE UP (ref 0–0.9)
KETONES UR-MCNC: NEGATIVE MG/DL — SIGNIFICANT CHANGE UP
LEUKOCYTE ESTERASE UR-ACNC: ABNORMAL
LIDOCAIN IGE QN: 132 U/L — SIGNIFICANT CHANGE UP (ref 73–393)
LYMPHOCYTES # BLD AUTO: 1.22 K/UL — SIGNIFICANT CHANGE UP (ref 1–3.3)
LYMPHOCYTES # BLD AUTO: 7.4 % — LOW (ref 13–44)
MCHC RBC-ENTMCNC: 32.5 PG — SIGNIFICANT CHANGE UP (ref 27–34)
MCHC RBC-ENTMCNC: 35.5 GM/DL — SIGNIFICANT CHANGE UP (ref 32–36)
MCV RBC AUTO: 91.3 FL — SIGNIFICANT CHANGE UP (ref 80–100)
MONOCYTES # BLD AUTO: 1.37 K/UL — HIGH (ref 0–0.9)
MONOCYTES NFR BLD AUTO: 8.3 % — SIGNIFICANT CHANGE UP (ref 2–14)
NEUTROPHILS # BLD AUTO: 13.72 K/UL — HIGH (ref 1.8–7.4)
NEUTROPHILS NFR BLD AUTO: 83.2 % — HIGH (ref 43–77)
NITRITE UR-MCNC: NEGATIVE — SIGNIFICANT CHANGE UP
NRBC # BLD: 0 /100 WBCS — SIGNIFICANT CHANGE UP (ref 0–0)
PH UR: 6 — SIGNIFICANT CHANGE UP (ref 5–8)
PLATELET # BLD AUTO: 227 K/UL — SIGNIFICANT CHANGE UP (ref 150–400)
POTASSIUM SERPL-MCNC: 3.5 MMOL/L — SIGNIFICANT CHANGE UP (ref 3.5–5.3)
POTASSIUM SERPL-SCNC: 3.5 MMOL/L — SIGNIFICANT CHANGE UP (ref 3.5–5.3)
PROT SERPL-MCNC: 6.4 G/DL — SIGNIFICANT CHANGE UP (ref 6–8.3)
PROT UR-MCNC: NEGATIVE MG/DL — SIGNIFICANT CHANGE UP
RBC # BLD: 3.79 M/UL — LOW (ref 3.8–5.2)
RBC # FLD: 12.1 % — SIGNIFICANT CHANGE UP (ref 10.3–14.5)
RBC CASTS # UR COMP ASSIST: 0 /HPF — SIGNIFICANT CHANGE UP (ref 0–4)
SODIUM SERPL-SCNC: 128 MMOL/L — LOW (ref 135–145)
SP GR SPEC: 1.02 — SIGNIFICANT CHANGE UP (ref 1–1.03)
TROPONIN I, HIGH SENSITIVITY RESULT: 7.7 NG/L — SIGNIFICANT CHANGE UP
UROBILINOGEN FLD QL: 0.2 MG/DL — SIGNIFICANT CHANGE UP (ref 0.2–1)
WBC # BLD: 16.48 K/UL — HIGH (ref 3.8–10.5)
WBC # FLD AUTO: 16.48 K/UL — HIGH (ref 3.8–10.5)
WBC UR QL: 0 /HPF — SIGNIFICANT CHANGE UP (ref 0–5)

## 2023-07-01 PROCEDURE — 99285 EMERGENCY DEPT VISIT HI MDM: CPT | Mod: 25

## 2023-07-01 PROCEDURE — 96375 TX/PRO/DX INJ NEW DRUG ADDON: CPT

## 2023-07-01 PROCEDURE — 74177 CT ABD & PELVIS W/CONTRAST: CPT | Mod: MA

## 2023-07-01 PROCEDURE — 85025 COMPLETE CBC W/AUTO DIFF WBC: CPT

## 2023-07-01 PROCEDURE — 93005 ELECTROCARDIOGRAM TRACING: CPT

## 2023-07-01 PROCEDURE — 96365 THER/PROPH/DIAG IV INF INIT: CPT | Mod: XU

## 2023-07-01 PROCEDURE — 36415 COLL VENOUS BLD VENIPUNCTURE: CPT

## 2023-07-01 PROCEDURE — 87186 SC STD MICRODIL/AGAR DIL: CPT

## 2023-07-01 PROCEDURE — 87077 CULTURE AEROBIC IDENTIFY: CPT

## 2023-07-01 PROCEDURE — 96367 TX/PROPH/DG ADDL SEQ IV INF: CPT

## 2023-07-01 PROCEDURE — 80053 COMPREHEN METABOLIC PANEL: CPT

## 2023-07-01 PROCEDURE — 96361 HYDRATE IV INFUSION ADD-ON: CPT

## 2023-07-01 PROCEDURE — 87086 URINE CULTURE/COLONY COUNT: CPT

## 2023-07-01 PROCEDURE — 74177 CT ABD & PELVIS W/CONTRAST: CPT | Mod: 26,MA

## 2023-07-01 PROCEDURE — 96368 THER/DIAG CONCURRENT INF: CPT

## 2023-07-01 PROCEDURE — 83690 ASSAY OF LIPASE: CPT

## 2023-07-01 PROCEDURE — 81001 URINALYSIS AUTO W/SCOPE: CPT

## 2023-07-01 PROCEDURE — 96376 TX/PRO/DX INJ SAME DRUG ADON: CPT

## 2023-07-01 PROCEDURE — 84484 ASSAY OF TROPONIN QUANT: CPT

## 2023-07-01 RX ORDER — METRONIDAZOLE 500 MG
1 TABLET ORAL
Qty: 30 | Refills: 0
Start: 2023-07-01 | End: 2023-07-10

## 2023-07-01 RX ORDER — CIPROFLOXACIN LACTATE 400MG/40ML
400 VIAL (ML) INTRAVENOUS ONCE
Refills: 0 | Status: COMPLETED | OUTPATIENT
Start: 2023-07-01 | End: 2023-07-01

## 2023-07-01 RX ORDER — TRAMADOL HYDROCHLORIDE 50 MG/1
1 TABLET ORAL
Qty: 4 | Refills: 0
Start: 2023-07-01 | End: 2023-07-02

## 2023-07-01 RX ORDER — METRONIDAZOLE 500 MG
500 TABLET ORAL ONCE
Refills: 0 | Status: COMPLETED | OUTPATIENT
Start: 2023-07-01 | End: 2023-07-01

## 2023-07-01 RX ORDER — ACETAMINOPHEN 500 MG
1000 TABLET ORAL ONCE
Refills: 0 | Status: COMPLETED | OUTPATIENT
Start: 2023-07-01 | End: 2023-07-01

## 2023-07-01 RX ORDER — ONDANSETRON 8 MG/1
4 TABLET, FILM COATED ORAL ONCE
Refills: 0 | Status: COMPLETED | OUTPATIENT
Start: 2023-07-01 | End: 2023-07-01

## 2023-07-01 RX ORDER — FAMOTIDINE 10 MG/ML
20 INJECTION INTRAVENOUS ONCE
Refills: 0 | Status: COMPLETED | OUTPATIENT
Start: 2023-07-01 | End: 2023-07-01

## 2023-07-01 RX ORDER — ACETAMINOPHEN 500 MG
1 TABLET ORAL
Qty: 30 | Refills: 0
Start: 2023-07-01 | End: 2023-07-10

## 2023-07-01 RX ORDER — MORPHINE SULFATE 50 MG/1
2 CAPSULE, EXTENDED RELEASE ORAL ONCE
Refills: 0 | Status: DISCONTINUED | OUTPATIENT
Start: 2023-07-01 | End: 2023-07-01

## 2023-07-01 RX ORDER — CIPROFLOXACIN LACTATE 400MG/40ML
1 VIAL (ML) INTRAVENOUS
Qty: 20 | Refills: 0
Start: 2023-07-01 | End: 2023-07-10

## 2023-07-01 RX ADMIN — ONDANSETRON 4 MILLIGRAM(S): 8 TABLET, FILM COATED ORAL at 04:15

## 2023-07-01 RX ADMIN — Medication 500 MILLIGRAM(S): at 03:32

## 2023-07-01 RX ADMIN — Medication 1000 MILLIGRAM(S): at 01:55

## 2023-07-01 RX ADMIN — Medication 100 MILLIGRAM(S): at 03:00

## 2023-07-01 RX ADMIN — ONDANSETRON 4 MILLIGRAM(S): 8 TABLET, FILM COATED ORAL at 05:15

## 2023-07-01 RX ADMIN — Medication 400 MILLIGRAM(S): at 01:39

## 2023-07-01 RX ADMIN — FAMOTIDINE 20 MILLIGRAM(S): 10 INJECTION INTRAVENOUS at 02:10

## 2023-07-01 RX ADMIN — Medication 1000 MILLIGRAM(S): at 02:09

## 2023-07-01 RX ADMIN — SODIUM CHLORIDE 1000 MILLILITER(S): 9 INJECTION INTRAMUSCULAR; INTRAVENOUS; SUBCUTANEOUS at 01:41

## 2023-07-01 RX ADMIN — MORPHINE SULFATE 2 MILLIGRAM(S): 50 CAPSULE, EXTENDED RELEASE ORAL at 04:15

## 2023-07-01 RX ADMIN — SODIUM CHLORIDE 1000 MILLILITER(S): 9 INJECTION INTRAMUSCULAR; INTRAVENOUS; SUBCUTANEOUS at 00:40

## 2023-07-01 RX ADMIN — FAMOTIDINE 100 MILLIGRAM(S): 10 INJECTION INTRAVENOUS at 01:53

## 2023-07-01 RX ADMIN — MORPHINE SULFATE 2 MILLIGRAM(S): 50 CAPSULE, EXTENDED RELEASE ORAL at 04:45

## 2023-07-01 RX ADMIN — Medication 200 MILLIGRAM(S): at 03:34

## 2023-07-01 NOTE — ED PROVIDER NOTE - CLINICAL SUMMARY MEDICAL DECISION MAKING FREE TEXT BOX
83-year-old female came to the emergency chief complaint of abdominal pain started yesterday crampy 8 out of 10 patient also stated had some loose stools no blood or mucus in the stool patient called her primary care doctor Dr. Skip Godwin who recommended patient to go to the emergency room for evaluation patient has a history of diverticular bleed in the past Patient denies any fever no nausea vomiting No chest pain no shortness of breath  White count is 16,000 and sodium 128 CT abdomen consistent with acute diverticulitis no abscess no perforation

## 2023-07-01 NOTE — ED PROVIDER NOTE - PATIENT PORTAL LINK FT
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  No disc disease.  No neuroforaminal narrowing. You can access the FollowMyHealth Patient Portal offered by [facility] by registering at the following website: [email or web address]. By joining [employer]’s FollowMyHealth portal, you will also be able to view your health information using other applications (apps) compatible with our system.

## 2023-07-01 NOTE — ED PROVIDER NOTE - PHYSICAL EXAMINATION
General:     NAD, well-nourished, well-appearing  Head:     NC/AT, EOMI, oral mucosa moist  Neck:     trachea midline  Lungs:     CTA b/l, no w/r/r  CVS:     S1S2, RRR, no m/g/r  Abd:     +BS, s/Positive epigastric and left lower quadrant tendernessNo rebound no guarding positive equal femoral pulses bilaterally/nd, no organomegaly  Ext:    2+ radial and pedal pulses, no c/c/e  Neuro: AAOx3, no sensory/motor deficits

## 2023-07-01 NOTE — ED PROVIDER NOTE - OBJECTIVE STATEMENT
83-year-old female came to the emergency chief complaint of abdominal pain started yesterday crampy 8 out of 10 patient also stated had some loose stools no blood or mucus in the stool patient called her primary care doctor Dr. Skip Godwin who recommended patient to go to the emergency room for evaluation patient has a history of diverticular bleed in the past Patient denies any fever no nausea vomiting No chest pain no shortness of breath

## 2023-07-01 NOTE — ED PROVIDER NOTE - BIRTH SEX
Physical Discharge Summary Addendum:  Date: 7/1/2021  Total Number of Visits: 3  Referred by: Rolanda Hart DO  Medical Diagnosis (from order):   Diagnosis Information      Diagnosis    719.46 (ICD-9-CM) - M25.562 (ICD-10-CM) - Left knee pain, unspecified chronicity                Patient discharged due to not scheduling more appointments.  Status of goals: unable to reassess due to cancelled appointments with family emergency     Female

## 2023-07-01 NOTE — ED PROVIDER NOTE - NSFOLLOWUPINSTRUCTIONS_ED_ALL_ED_FT
Follow up with your PMD within 1-2 days.  Rest, increase your fluids, advance your activity as tolerated.   Take all of your other medications as previously prescribed.   Worsening, continued or ANY new concerning symptoms return to the emergency department. Follow up with your PMD within 1-2 days.  Rest, increase your fluids, advance your activity as tolerated.   Take all of your other medications as previously prescribed.   Worsening, continued or ANY new concerning symptoms return to the emergency department.  Clear liquid diet for 2 to 3 days low fiber diet return to the emergency room for worsening of condition activity or behavior

## 2023-07-01 NOTE — ED PROVIDER NOTE - CARE PROVIDER_API CALL
Raghav Flores  Gastroenterology  [address], [address]  [place] [address]  Phone: [phone]  Fax: [phone]  Follow Up Time:

## 2023-07-04 LAB
-  AMIKACIN: SIGNIFICANT CHANGE UP
-  AMOXICILLIN/CLAVULANIC ACID: SIGNIFICANT CHANGE UP
-  AMPICILLIN/SULBACTAM: SIGNIFICANT CHANGE UP
-  AMPICILLIN: SIGNIFICANT CHANGE UP
-  AZTREONAM: SIGNIFICANT CHANGE UP
-  CEFAZOLIN: SIGNIFICANT CHANGE UP
-  CEFEPIME: SIGNIFICANT CHANGE UP
-  CEFOXITIN: SIGNIFICANT CHANGE UP
-  CEFTRIAXONE: SIGNIFICANT CHANGE UP
-  CIPROFLOXACIN: SIGNIFICANT CHANGE UP
-  ERTAPENEM: SIGNIFICANT CHANGE UP
-  GENTAMICIN: SIGNIFICANT CHANGE UP
-  IMIPENEM: SIGNIFICANT CHANGE UP
-  LEVOFLOXACIN: SIGNIFICANT CHANGE UP
-  MEROPENEM: SIGNIFICANT CHANGE UP
-  NITROFURANTOIN: SIGNIFICANT CHANGE UP
-  PIPERACILLIN/TAZOBACTAM: SIGNIFICANT CHANGE UP
-  TOBRAMYCIN: SIGNIFICANT CHANGE UP
-  TRIMETHOPRIM/SULFAMETHOXAZOLE: SIGNIFICANT CHANGE UP
CULTURE RESULTS: SIGNIFICANT CHANGE UP
METHOD TYPE: SIGNIFICANT CHANGE UP
ORGANISM # SPEC MICROSCOPIC CNT: SIGNIFICANT CHANGE UP
ORGANISM # SPEC MICROSCOPIC CNT: SIGNIFICANT CHANGE UP
SPECIMEN SOURCE: SIGNIFICANT CHANGE UP

## 2023-07-06 NOTE — CHART NOTE - NSCHARTNOTEFT_GEN_A_CORE
RACHEL placed call to patient to discuss and assist with follow up care.  Patient presented to ED on 6/30/23 due to abdominal pain.  Patient declined assistance at this time with scheduling PMD or gastro follow up.  SW offered to call daughter to discuss further, patient in agreement.  Patient provided daughters number, [phone].  Call placed but no answer.

## 2023-08-05 ENCOUNTER — TRANSCRIPTION ENCOUNTER (OUTPATIENT)
Age: 83
End: 2023-08-05

## 2023-08-06 ENCOUNTER — INPATIENT (INPATIENT)
Facility: HOSPITAL | Age: 83
LOS: 3 days | Discharge: NURSING, REHAB OR LONG-TERM CARE | DRG: 336 | End: 2023-08-10
Attending: HOSPITALIST | Admitting: HOSPITALIST
Payer: COMMERCIAL

## 2023-08-06 VITALS
HEART RATE: 59 BPM | OXYGEN SATURATION: 94 % | SYSTOLIC BLOOD PRESSURE: 130 MMHG | WEIGHT: 143.08 LBS | RESPIRATION RATE: 16 BRPM | TEMPERATURE: 97 F | DIASTOLIC BLOOD PRESSURE: 56 MMHG | HEIGHT: 63 IN

## 2023-08-06 DIAGNOSIS — Z98.89 OTHER SPECIFIED POSTPROCEDURAL STATES: Chronic | ICD-10-CM

## 2023-08-06 DIAGNOSIS — K56.609 UNSPECIFIED INTESTINAL OBSTRUCTION, UNSPECIFIED AS TO PARTIAL VERSUS COMPLETE OBSTRUCTION: ICD-10-CM

## 2023-08-06 DIAGNOSIS — Z90.710 ACQUIRED ABSENCE OF BOTH CERVIX AND UTERUS: Chronic | ICD-10-CM

## 2023-08-06 LAB
ALBUMIN SERPL ELPH-MCNC: 3.3 G/DL — SIGNIFICANT CHANGE UP (ref 3.3–5)
ALP SERPL-CCNC: 90 U/L — SIGNIFICANT CHANGE UP (ref 40–120)
ALT FLD-CCNC: 17 U/L — SIGNIFICANT CHANGE UP (ref 10–45)
ANION GAP SERPL CALC-SCNC: 11 MMOL/L — SIGNIFICANT CHANGE UP (ref 5–17)
ANION GAP SERPL CALC-SCNC: 11 MMOL/L — SIGNIFICANT CHANGE UP (ref 5–17)
APTT BLD: 25.6 SEC — SIGNIFICANT CHANGE UP (ref 24.5–35.6)
AST SERPL-CCNC: 22 U/L — SIGNIFICANT CHANGE UP (ref 10–40)
BASOPHILS # BLD AUTO: 0.03 K/UL — SIGNIFICANT CHANGE UP (ref 0–0.2)
BASOPHILS NFR BLD AUTO: 0.3 % — SIGNIFICANT CHANGE UP (ref 0–2)
BILIRUB SERPL-MCNC: 0.8 MG/DL — SIGNIFICANT CHANGE UP (ref 0.2–1.2)
BLD GP AB SCN SERPL QL: SIGNIFICANT CHANGE UP
BUN SERPL-MCNC: 17 MG/DL — SIGNIFICANT CHANGE UP (ref 7–23)
BUN SERPL-MCNC: 23 MG/DL — SIGNIFICANT CHANGE UP (ref 7–23)
CALCIUM SERPL-MCNC: 7.9 MG/DL — LOW (ref 8.4–10.5)
CALCIUM SERPL-MCNC: 9.2 MG/DL — SIGNIFICANT CHANGE UP (ref 8.4–10.5)
CHLORIDE SERPL-SCNC: 97 MMOL/L — SIGNIFICANT CHANGE UP (ref 96–108)
CHLORIDE SERPL-SCNC: 99 MMOL/L — SIGNIFICANT CHANGE UP (ref 96–108)
CO2 SERPL-SCNC: 23 MMOL/L — SIGNIFICANT CHANGE UP (ref 22–31)
CO2 SERPL-SCNC: 27 MMOL/L — SIGNIFICANT CHANGE UP (ref 22–31)
CREAT SERPL-MCNC: 0.71 MG/DL — SIGNIFICANT CHANGE UP (ref 0.5–1.3)
CREAT SERPL-MCNC: 0.73 MG/DL — SIGNIFICANT CHANGE UP (ref 0.5–1.3)
EGFR: 81 ML/MIN/1.73M2 — SIGNIFICANT CHANGE UP
EGFR: 84 ML/MIN/1.73M2 — SIGNIFICANT CHANGE UP
EOSINOPHIL # BLD AUTO: 0.11 K/UL — SIGNIFICANT CHANGE UP (ref 0–0.5)
EOSINOPHIL NFR BLD AUTO: 1 % — SIGNIFICANT CHANGE UP (ref 0–6)
FLUAV AG NPH QL: SIGNIFICANT CHANGE UP
FLUBV AG NPH QL: SIGNIFICANT CHANGE UP
GLUCOSE SERPL-MCNC: 135 MG/DL — HIGH (ref 70–99)
GLUCOSE SERPL-MCNC: 145 MG/DL — HIGH (ref 70–99)
HCT VFR BLD CALC: 35.8 % — SIGNIFICANT CHANGE UP (ref 34.5–45)
HCT VFR BLD CALC: 37.3 % — SIGNIFICANT CHANGE UP (ref 34.5–45)
HGB BLD-MCNC: 12.5 G/DL — SIGNIFICANT CHANGE UP (ref 11.5–15.5)
HGB BLD-MCNC: 12.6 G/DL — SIGNIFICANT CHANGE UP (ref 11.5–15.5)
IMM GRANULOCYTES NFR BLD AUTO: 0.6 % — SIGNIFICANT CHANGE UP (ref 0–0.9)
INR BLD: 0.96 RATIO — SIGNIFICANT CHANGE UP (ref 0.85–1.18)
LACTATE SERPL-SCNC: 0.9 MMOL/L — SIGNIFICANT CHANGE UP (ref 0.7–2)
LACTATE SERPL-SCNC: 2.3 MMOL/L — HIGH (ref 0.7–2)
LIDOCAIN IGE QN: 98 U/L — SIGNIFICANT CHANGE UP (ref 73–393)
LYMPHOCYTES # BLD AUTO: 2.86 K/UL — SIGNIFICANT CHANGE UP (ref 1–3.3)
LYMPHOCYTES # BLD AUTO: 26.3 % — SIGNIFICANT CHANGE UP (ref 13–44)
MAGNESIUM SERPL-MCNC: 1.2 MG/DL — LOW (ref 1.6–2.6)
MCHC RBC-ENTMCNC: 32.3 PG — SIGNIFICANT CHANGE UP (ref 27–34)
MCHC RBC-ENTMCNC: 32.7 PG — SIGNIFICANT CHANGE UP (ref 27–34)
MCHC RBC-ENTMCNC: 33.8 GM/DL — SIGNIFICANT CHANGE UP (ref 32–36)
MCHC RBC-ENTMCNC: 34.9 GM/DL — SIGNIFICANT CHANGE UP (ref 32–36)
MCV RBC AUTO: 93.7 FL — SIGNIFICANT CHANGE UP (ref 80–100)
MCV RBC AUTO: 95.6 FL — SIGNIFICANT CHANGE UP (ref 80–100)
MONOCYTES # BLD AUTO: 1.05 K/UL — HIGH (ref 0–0.9)
MONOCYTES NFR BLD AUTO: 9.7 % — SIGNIFICANT CHANGE UP (ref 2–14)
NEUTROPHILS # BLD AUTO: 6.77 K/UL — SIGNIFICANT CHANGE UP (ref 1.8–7.4)
NEUTROPHILS NFR BLD AUTO: 62.1 % — SIGNIFICANT CHANGE UP (ref 43–77)
NRBC # BLD: 0 /100 WBCS — SIGNIFICANT CHANGE UP (ref 0–0)
NRBC # BLD: 0 /100 WBCS — SIGNIFICANT CHANGE UP (ref 0–0)
PLATELET # BLD AUTO: 222 K/UL — SIGNIFICANT CHANGE UP (ref 150–400)
PLATELET # BLD AUTO: 284 K/UL — SIGNIFICANT CHANGE UP (ref 150–400)
POTASSIUM SERPL-MCNC: 3.6 MMOL/L — SIGNIFICANT CHANGE UP (ref 3.5–5.3)
POTASSIUM SERPL-MCNC: 3.6 MMOL/L — SIGNIFICANT CHANGE UP (ref 3.5–5.3)
POTASSIUM SERPL-SCNC: 3.6 MMOL/L — SIGNIFICANT CHANGE UP (ref 3.5–5.3)
POTASSIUM SERPL-SCNC: 3.6 MMOL/L — SIGNIFICANT CHANGE UP (ref 3.5–5.3)
PROT SERPL-MCNC: 6.4 G/DL — SIGNIFICANT CHANGE UP (ref 6–8.3)
PROTHROM AB SERPL-ACNC: 11 SEC — SIGNIFICANT CHANGE UP (ref 9.5–13)
RBC # BLD: 3.82 M/UL — SIGNIFICANT CHANGE UP (ref 3.8–5.2)
RBC # BLD: 3.9 M/UL — SIGNIFICANT CHANGE UP (ref 3.8–5.2)
RBC # FLD: 12.8 % — SIGNIFICANT CHANGE UP (ref 10.3–14.5)
RBC # FLD: 13.1 % — SIGNIFICANT CHANGE UP (ref 10.3–14.5)
RSV RNA NPH QL NAA+NON-PROBE: SIGNIFICANT CHANGE UP
SARS-COV-2 RNA SPEC QL NAA+PROBE: SIGNIFICANT CHANGE UP
SODIUM SERPL-SCNC: 133 MMOL/L — LOW (ref 135–145)
SODIUM SERPL-SCNC: 135 MMOL/L — SIGNIFICANT CHANGE UP (ref 135–145)
TROPONIN I, HIGH SENSITIVITY RESULT: 6.4 NG/L — SIGNIFICANT CHANGE UP
WBC # BLD: 10.88 K/UL — HIGH (ref 3.8–10.5)
WBC # BLD: 12.36 K/UL — HIGH (ref 3.8–10.5)
WBC # FLD AUTO: 10.88 K/UL — HIGH (ref 3.8–10.5)
WBC # FLD AUTO: 12.36 K/UL — HIGH (ref 3.8–10.5)

## 2023-08-06 PROCEDURE — 99223 1ST HOSP IP/OBS HIGH 75: CPT

## 2023-08-06 PROCEDURE — 93010 ELECTROCARDIOGRAM REPORT: CPT

## 2023-08-06 PROCEDURE — 74177 CT ABD & PELVIS W/CONTRAST: CPT | Mod: 26,MA

## 2023-08-06 PROCEDURE — 58660 LAPAROSCOPY LYSIS: CPT

## 2023-08-06 PROCEDURE — 99291 CRITICAL CARE FIRST HOUR: CPT

## 2023-08-06 PROCEDURE — 71045 X-RAY EXAM CHEST 1 VIEW: CPT | Mod: 26

## 2023-08-06 RX ORDER — EZETIMIBE 10 MG/1
10 TABLET ORAL DAILY
Refills: 0 | Status: CANCELLED | OUTPATIENT
Start: 2023-08-07 | End: 2023-08-06

## 2023-08-06 RX ORDER — SODIUM CHLORIDE 9 MG/ML
1000 INJECTION INTRAMUSCULAR; INTRAVENOUS; SUBCUTANEOUS
Refills: 0 | Status: DISCONTINUED | OUTPATIENT
Start: 2023-08-06 | End: 2023-08-06

## 2023-08-06 RX ORDER — TRAMADOL HYDROCHLORIDE 50 MG/1
50 TABLET ORAL EVERY 4 HOURS
Refills: 0 | Status: DISCONTINUED | OUTPATIENT
Start: 2023-08-06 | End: 2023-08-10

## 2023-08-06 RX ORDER — ONDANSETRON 8 MG/1
4 TABLET, FILM COATED ORAL EVERY 6 HOURS
Refills: 0 | Status: DISCONTINUED | OUTPATIENT
Start: 2023-08-06 | End: 2023-08-10

## 2023-08-06 RX ORDER — ACETAMINOPHEN 500 MG
1000 TABLET ORAL ONCE
Refills: 0 | Status: COMPLETED | OUTPATIENT
Start: 2023-08-07 | End: 2023-08-07

## 2023-08-06 RX ORDER — ONDANSETRON 8 MG/1
4 TABLET, FILM COATED ORAL ONCE
Refills: 0 | Status: DISCONTINUED | OUTPATIENT
Start: 2023-08-06 | End: 2023-08-06

## 2023-08-06 RX ORDER — HYDROMORPHONE HYDROCHLORIDE 2 MG/ML
1 INJECTION INTRAMUSCULAR; INTRAVENOUS; SUBCUTANEOUS EVERY 4 HOURS
Refills: 0 | Status: DISCONTINUED | OUTPATIENT
Start: 2023-08-06 | End: 2023-08-10

## 2023-08-06 RX ORDER — AMLODIPINE BESYLATE 2.5 MG/1
5 TABLET ORAL
Refills: 0 | Status: DISCONTINUED | OUTPATIENT
Start: 2023-08-06 | End: 2023-08-06

## 2023-08-06 RX ORDER — HYDROMORPHONE HYDROCHLORIDE 2 MG/ML
0.25 INJECTION INTRAMUSCULAR; INTRAVENOUS; SUBCUTANEOUS
Refills: 0 | Status: DISCONTINUED | OUTPATIENT
Start: 2023-08-06 | End: 2023-08-06

## 2023-08-06 RX ORDER — ONDANSETRON 8 MG/1
4 TABLET, FILM COATED ORAL ONCE
Refills: 0 | Status: COMPLETED | OUTPATIENT
Start: 2023-08-06 | End: 2023-08-06

## 2023-08-06 RX ORDER — ACETAMINOPHEN 500 MG
650 TABLET ORAL EVERY 6 HOURS
Refills: 0 | Status: DISCONTINUED | OUTPATIENT
Start: 2023-08-06 | End: 2023-08-06

## 2023-08-06 RX ORDER — HYDROMORPHONE HYDROCHLORIDE 2 MG/ML
0.5 INJECTION INTRAMUSCULAR; INTRAVENOUS; SUBCUTANEOUS
Refills: 0 | Status: DISCONTINUED | OUTPATIENT
Start: 2023-08-06 | End: 2023-08-06

## 2023-08-06 RX ORDER — MAGNESIUM SULFATE 500 MG/ML
2 VIAL (ML) INJECTION ONCE
Refills: 0 | Status: COMPLETED | OUTPATIENT
Start: 2023-08-06 | End: 2023-08-06

## 2023-08-06 RX ORDER — SODIUM CHLORIDE 9 MG/ML
1000 INJECTION, SOLUTION INTRAVENOUS
Refills: 0 | Status: DISCONTINUED | OUTPATIENT
Start: 2023-08-06 | End: 2023-08-08

## 2023-08-06 RX ORDER — SODIUM CHLORIDE 9 MG/ML
1000 INJECTION INTRAMUSCULAR; INTRAVENOUS; SUBCUTANEOUS ONCE
Refills: 0 | Status: COMPLETED | OUTPATIENT
Start: 2023-08-06 | End: 2023-08-06

## 2023-08-06 RX ORDER — SUCRALFATE 1 G
1 TABLET ORAL ONCE
Refills: 0 | Status: COMPLETED | OUTPATIENT
Start: 2023-08-06 | End: 2023-08-06

## 2023-08-06 RX ORDER — MORPHINE SULFATE 50 MG/1
4 CAPSULE, EXTENDED RELEASE ORAL ONCE
Refills: 0 | Status: DISCONTINUED | OUTPATIENT
Start: 2023-08-06 | End: 2023-08-06

## 2023-08-06 RX ORDER — FAMOTIDINE 10 MG/ML
20 INJECTION INTRAVENOUS ONCE
Refills: 0 | Status: COMPLETED | OUTPATIENT
Start: 2023-08-06 | End: 2023-08-06

## 2023-08-06 RX ORDER — MORPHINE SULFATE 50 MG/1
2 CAPSULE, EXTENDED RELEASE ORAL EVERY 4 HOURS
Refills: 0 | Status: DISCONTINUED | OUTPATIENT
Start: 2023-08-06 | End: 2023-08-06

## 2023-08-06 RX ORDER — NALOXONE HYDROCHLORIDE 4 MG/.1ML
0.4 SPRAY NASAL ONCE
Refills: 0 | Status: DISCONTINUED | OUTPATIENT
Start: 2023-08-06 | End: 2023-08-06

## 2023-08-06 RX ORDER — SODIUM CHLORIDE 9 MG/ML
1000 INJECTION, SOLUTION INTRAVENOUS
Refills: 0 | Status: DISCONTINUED | OUTPATIENT
Start: 2023-08-06 | End: 2023-08-06

## 2023-08-06 RX ORDER — AMLODIPINE BESYLATE 2.5 MG/1
5 TABLET ORAL
Refills: 0 | Status: DISCONTINUED | OUTPATIENT
Start: 2023-08-06 | End: 2023-08-10

## 2023-08-06 RX ORDER — HYDROMORPHONE HYDROCHLORIDE 2 MG/ML
0.5 INJECTION INTRAMUSCULAR; INTRAVENOUS; SUBCUTANEOUS ONCE
Refills: 0 | Status: DISCONTINUED | OUTPATIENT
Start: 2023-08-06 | End: 2023-08-06

## 2023-08-06 RX ORDER — MORPHINE SULFATE 50 MG/1
4 CAPSULE, EXTENDED RELEASE ORAL EVERY 4 HOURS
Refills: 0 | Status: DISCONTINUED | OUTPATIENT
Start: 2023-08-06 | End: 2023-08-06

## 2023-08-06 RX ORDER — PIPERACILLIN AND TAZOBACTAM 4; .5 G/20ML; G/20ML
3.38 INJECTION, POWDER, LYOPHILIZED, FOR SOLUTION INTRAVENOUS ONCE
Refills: 0 | Status: COMPLETED | OUTPATIENT
Start: 2023-08-06 | End: 2023-08-06

## 2023-08-06 RX ORDER — ONDANSETRON 8 MG/1
4 TABLET, FILM COATED ORAL EVERY 6 HOURS
Refills: 0 | Status: DISCONTINUED | OUTPATIENT
Start: 2023-08-06 | End: 2023-08-06

## 2023-08-06 RX ADMIN — SODIUM CHLORIDE 1000 MILLILITER(S): 9 INJECTION INTRAMUSCULAR; INTRAVENOUS; SUBCUTANEOUS at 11:24

## 2023-08-06 RX ADMIN — PIPERACILLIN AND TAZOBACTAM 3.38 GRAM(S): 4; .5 INJECTION, POWDER, LYOPHILIZED, FOR SOLUTION INTRAVENOUS at 15:05

## 2023-08-06 RX ADMIN — Medication 30 MILLILITER(S): at 12:01

## 2023-08-06 RX ADMIN — MORPHINE SULFATE 4 MILLIGRAM(S): 50 CAPSULE, EXTENDED RELEASE ORAL at 17:03

## 2023-08-06 RX ADMIN — FAMOTIDINE 100 MILLIGRAM(S): 10 INJECTION INTRAVENOUS at 12:01

## 2023-08-06 RX ADMIN — HYDROMORPHONE HYDROCHLORIDE 0.5 MILLIGRAM(S): 2 INJECTION INTRAMUSCULAR; INTRAVENOUS; SUBCUTANEOUS at 14:56

## 2023-08-06 RX ADMIN — ONDANSETRON 4 MILLIGRAM(S): 8 TABLET, FILM COATED ORAL at 11:24

## 2023-08-06 RX ADMIN — FAMOTIDINE 20 MILLIGRAM(S): 10 INJECTION INTRAVENOUS at 12:31

## 2023-08-06 RX ADMIN — SODIUM CHLORIDE 100 MILLILITER(S): 9 INJECTION, SOLUTION INTRAVENOUS at 22:43

## 2023-08-06 RX ADMIN — SODIUM CHLORIDE 1000 MILLILITER(S): 9 INJECTION INTRAMUSCULAR; INTRAVENOUS; SUBCUTANEOUS at 12:24

## 2023-08-06 RX ADMIN — MORPHINE SULFATE 4 MILLIGRAM(S): 50 CAPSULE, EXTENDED RELEASE ORAL at 11:39

## 2023-08-06 RX ADMIN — SODIUM CHLORIDE 1000 MILLILITER(S): 9 INJECTION INTRAMUSCULAR; INTRAVENOUS; SUBCUTANEOUS at 14:35

## 2023-08-06 RX ADMIN — MORPHINE SULFATE 4 MILLIGRAM(S): 50 CAPSULE, EXTENDED RELEASE ORAL at 16:48

## 2023-08-06 RX ADMIN — Medication 25 GRAM(S): at 22:42

## 2023-08-06 RX ADMIN — PIPERACILLIN AND TAZOBACTAM 200 GRAM(S): 4; .5 INJECTION, POWDER, LYOPHILIZED, FOR SOLUTION INTRAVENOUS at 14:35

## 2023-08-06 RX ADMIN — Medication 1 GRAM(S): at 12:01

## 2023-08-06 RX ADMIN — SODIUM CHLORIDE 1000 MILLILITER(S): 9 INJECTION INTRAMUSCULAR; INTRAVENOUS; SUBCUTANEOUS at 15:35

## 2023-08-06 RX ADMIN — MORPHINE SULFATE 4 MILLIGRAM(S): 50 CAPSULE, EXTENDED RELEASE ORAL at 11:24

## 2023-08-06 RX ADMIN — HYDROMORPHONE HYDROCHLORIDE 0.5 MILLIGRAM(S): 2 INJECTION INTRAMUSCULAR; INTRAVENOUS; SUBCUTANEOUS at 15:11

## 2023-08-06 NOTE — H&P ADULT - NSICDXPASTMEDICALHX_GEN_ALL_CORE_FT
Vitamin D3 200IUs oral daily, OTC.  All Birth control methods discussion.  Considering the IUD versus the Nexplanon.  Leaning toward the Nexplanon. Pamphlets taken home to review.  If needed okay to refill the ortho evra patch for one month  Patient left without further questions or concerns.     PAST MEDICAL HISTORY:  HTN (hypertension)     Osteoarthritis     Osteoporosis

## 2023-08-06 NOTE — ED PROVIDER NOTE - CRITICAL CARE ATTENDING CONTRIBUTION TO CARE
Upon my evaluation, this patient had a high probability of imminent or lifethreatening deterioration due to closed loop bowel obstruction, which required my direct attention, intervention, and personal management.     I have personally provided 31 minutes of critical care time exclusive of time spent on separately billable procedures. Time includes review of laboratory data, radiology results, discussion with consultants, and monitoring for potential decompensation. Interventions were performed as documented above.    - Tank Church MD, Emergency Medicine and Medical Toxicology Attending.

## 2023-08-06 NOTE — H&P ADULT - NSHPREVIEWOFSYSTEMS_GEN_ALL_CORE
CONSTITUTIONAL: No fever, weight loss, or fatigue  EYES: No eye pain, visual disturbances, or discharge  ENMT:  No difficulty hearing, tinnitus, vertigo; No sinus or throat pain  NECK: No pain or stiffness  RESPIRATORY: No cough, wheezing, chills or hemoptysis; No shortness of breath  CARDIOVASCULAR: No chest pain, palpitations, dizziness, or leg swelling  GASTROINTESTINAL: +abdominal pain, N, vomiting x 1  GENITOURINARY: No dysuria, frequency, hematuria, or incontinence  NEUROLOGICAL: No headaches, memory loss, loss of strength, numbness, or tremors  SKIN: No itching, burning, rashes, or lesions   MUSCULOSKELETAL: No joint pain or swelling; No muscle, back, or extremity pain  PSYCHIATRIC: No depression, anxiety, mood swings, or difficulty sleeping  ALLERGY AND IMMUNOLOGIC: No hives or eczema    ALL ROS REVIEWED AND NORMAL EXCEPT AS STATED ABOVE

## 2023-08-06 NOTE — ED ADULT NURSE REASSESSMENT NOTE - NS ED NURSE REASSESS COMMENT FT1
Patient aware of plan for surgery. Pain management provided. NG tube placed by surgical PA, NG tube is connected to suction. IVF in place. Awaiting time for surgery.

## 2023-08-06 NOTE — H&P ADULT - NSHPPHYSICALEXAM_GEN_ALL_CORE
T(C): 36.1 (08-06-23 @ 11:11), Max: 36.1 (08-06-23 @ 11:11)  HR: 59 (08-06-23 @ 11:11) (59 - 59)  BP: 130/56 (08-06-23 @ 11:11) (130/56 - 130/56)  RR: 16 (08-06-23 @ 11:11) (16 - 16)  SpO2: 94% (08-06-23 @ 11:11) (94% - 94%)  Wt(kg): --Vital Signs Last 24 Hrs  T(C): 36.1 (06 Aug 2023 11:11), Max: 36.1 (06 Aug 2023 11:11)  T(F): 97 (06 Aug 2023 11:11), Max: 97 (06 Aug 2023 11:11)  HR: 59 (06 Aug 2023 11:11) (59 - 59)  BP: 130/56 (06 Aug 2023 11:11) (130/56 - 130/56)  BP(mean): --  RR: 16 (06 Aug 2023 11:11) (16 - 16)  SpO2: 94% (06 Aug 2023 11:11) (94% - 94%)    Parameters below as of 06 Aug 2023 11:11  Patient On (Oxygen Delivery Method): room air        PHYSICAL EXAM:  GENERAL: NAD  HENT:  Atraumatic, Normocephalic; No tonsillar erythema, exudates, or enlargement; Moist mucous membranes;   EYES: EOMI, PERRLA, conjunctiva and sclera clear, no lid-lag  NECK: Supple, No JVD, Normal thyroid  NERVOUS SYSTEM:  CN II - XII intact; Sensation intact; Motor Strength 5/5 B/L upper and lower extremities  CHEST/LUNG: Clear to percussion bilaterally; No rales, rhonchi, wheezing, or rubs; normal respiratory effort, no intercostal retractions; No pitting edema  HEART: Regular rate and rhythm; No murmurs, rubs, or gallops  ABDOMEN: tense, +tender, +distention; Bowel sounds present; No HSM  MUSCULOSKELETAL/EXTREMITIES:  2+ Peripheral Pulses, No clubbing, or digital cyanosis  SKIN: No rashes or lesions; normal texture and temperature  PSYCH: Appropriate affect, Alert & Awake, anxious

## 2023-08-06 NOTE — ED PROVIDER NOTE - CLINICAL SUMMARY MEDICAL DECISION MAKING FREE TEXT BOX
Pt with PMHx of htn hysterectomy hernia repair OA p/w epigastric abdominal pain a/w nausea, vomiting,  (+)   history of surgery. No hx of trauma. No hematemesis or hematochezia/melena. Pt is hemodynamically stable, mentating well. Exam and history is concerning for pancreatitis vs sbo Will require diagnostic imaging.  Considered DDx but unlikely due to the clinical presentation and exam:   Cardiac: ACS, AAA rupture, dissection  Pulm: Lower lobe pneumonia  GI: SBO, Inflammatory bowel disease, Irritable Bowel Syndrome, viscous perforation,gastroenteritis, gastritis/PUD/perforated ulcer  Metabolic: DKA  Renal: Urolithiasis, UTI/cystitis/pyelonephritis.     PLAN:  - Reviewed patient's prior medical record.   - IV fluids, parenteral analgesia & anti-emetics PRN  - EKG , CBC, CMP, lipase, UA/UCx,  lactate   - CT abd/pelvis  showing closed loop bowel obstruction, discussed with surgery dr ruth, to be evaluation and admitted.  -Observation and reassessment, surgical consultation if necessary. Pt with PMHx of htn hysterectomy hernia repair OA p/w epigastric abdominal pain a/w nausea, vomiting,  (+)   history of surgery. No hx of trauma. No hematemesis or hematochezia/melena. Pt is hemodynamically stable, mentating well. Exam and history is concerning for pancreatitis vs sbo Will require diagnostic imaging.  Considered DDx but unlikely due to the clinical presentation and exam:   Cardiac: ACS, AAA rupture, dissection  Pulm: Lower lobe pneumonia  GI: SBO, Inflammatory bowel disease, Irritable Bowel Syndrome, viscous perforation, gastroenteritis, gastritis/PUD/perforated ulcer  Metabolic: DKA  Renal: Urolithiasis, UTI/cystitis/pyelonephritis.     PLAN:  - Reviewed patient's prior medical record.   - IV fluids, parenteral analgesia & anti-emetics PRN  - EKG , CBC, CMP, lipase, UA/UCx,  lactate   - CT abd/pelvis  showing closed loop bowel obstruction, discussed with surgery dr ruth, to be evaluation and admitted.  -Observation and reassessment, surgical consultation if necessary.

## 2023-08-06 NOTE — CONSULT NOTE ADULT - ASSESSMENT
83 yof PMHx of HTN, hysterectomy, hernia repair presenting with abdominal pain x 1 day. Epigastric mild pain, nonradiating, starting this AM. Surgery consulted for sbo 83 yof PMHx of HTN, hysterectomy, hernia repair presenting with abdominal pain x 1 day. Epigastric mild pain, nonradiating, starting this AM. Surgery consulted for sbo  pt with closed loop bowel obstruction, clinical history/exam and diagnostic workup consistent with acute surgical abdomen.  Family and pt counseled on diagnostic laparoscopy, possible open, bowel resection, ostomy,   Risks/benefits/contraindications including but not fully inclusive of bleeding, infeciton, anatomotic breakdown, missed injury, further surgery, blood clots, cardiac/pulm event, death discussed, they verbalized understanding, pt is a FULL CODE.  consent signed by pt.

## 2023-08-06 NOTE — PRE-OP CHECKLIST - SELECT TESTS ORDERED
CBC/PT/PTT/Type and Cross/Type and Screen/COVID-19 BMP/CBC/PT/PTT/Type and Cross/Type and Screen/EKG/COVID-19

## 2023-08-06 NOTE — ED ADULT NURSE NOTE - OBJECTIVE STATEMENT
Patient brought by EMS from home with complaint of severe abdominal pain and nausea. Patient denies any fever, chills, chest pain or SOB. Denies any recent surgery. PMH of HTN

## 2023-08-06 NOTE — ED ADULT NURSE NOTE - NSICDXPASTMEDICALHX_GEN_ALL_CORE_FT
· Follows with Dr Wagner Running as outpatient  · Continue Revlimid at home dosage  · Hemoglobin stable between 7-8  · Known history of transfusions in past   Has multiple autoantibodies  PAST MEDICAL HISTORY:  HTN (hypertension)     Osteoarthritis     Osteoporosis

## 2023-08-06 NOTE — ED PROVIDER NOTE - PHYSICAL EXAMINATION
VITAL SIGNS: I have reviewed nursing notes and confirm.   GEN: Well-developed; well-nourished; in no acute distress. Speaking full sentences.  SKIN: Warm, pink, no rash, no diaphoresis, no cyanosis, well perfused.   HEAD: Normocephalic; atraumatic. No scalp lacerations, no abrasions.  NECK: Supple; non tender.   EYES: Pupils 3mm equal, round, reactive to light and accomodation, conjunctiva and sclera clear. Extra-ocular movements intact bilaterally.  ENT: No nasal discharge; airway clear. Trachea is midline. ORAL: No oropharyngeal exudates or erythema. Normal dentition.  CV: Regular rate and rhythm. S1, S2 normal; no murmurs, gallops, or rubs. No lower extremity pitting edema bilaterally. Capillary refill < 2 seconds throughout. Distal pulses intact 2+ throughout.  RESP: CTA bilaterally. No wheezes, rales, or rhonchi.   ABD: Normal bowel sounds, soft, non-distended, (+) epigastric ttp moderate, no rebound, no guarding, no rigidity, no hepatosplenomegaly. No CVA tenderness bilaterally.  MSK: Normal range of motion and movement of all 4 extremities. No joint or muscular pain throughout. No clubbing.   BACK: No thoracolumbar midline or paravertebral tenderness. No step-offs or obvious deformities.  NEURO: Alert & oriented x 3, Grossly unremarkable. Sensory and motor intact throughout. No focal deficits. Gait: Fluid. Normal speech and coordination.   PSYCH: Cooperative, appropriate.

## 2023-08-06 NOTE — ED PROVIDER NOTE - MDM ORDERS SUBMITTED SELECTION
Labs/EKG/Imaging Studies/Medications
Endorsed pt to ED Hold RN Yulia. Pt remains sinus lucie on monitor in no acute distress. pt aware of staffing change`

## 2023-08-06 NOTE — CONSULT NOTE ADULT - PROBLEM SELECTOR RECOMMENDATION 9
Ngt placed, fu cxr   NPO/IVF  Medicine admission  Preop for OR dx lap  discussed with Dr. Chisholm and medicine

## 2023-08-06 NOTE — ED ADULT NURSE NOTE - NSFALLHARMRISKINTERV_ED_ALL_ED
Assistance OOB with selected safe patient handling equipment if applicable/Communicate risk of Fall with Harm to all staff, patient, and family/Monitor for mental status changes and reorient to person, place, and time, as needed/Move patient closer to nursing station/within visual sight of ED staff/Provide patient with walking aids/Provide visual cue: red socks, yellow wristband, yellow gown, etc/Reinforce activity limits and safety measures with patient and family/Toileting schedule using arm’s reach rule for commode and bathroom/Use of alarms - bed, stretcher, chair and/or video monitoring/Bed in lowest position, wheels locked, appropriate side rails in place/Call bell, personal items and telephone in reach/Instruct patient to call for assistance before getting out of bed/chair/stretcher/Non-slip footwear applied when patient is off stretcher/[place] to call system/Physically safe environment - no spills, clutter or unnecessary equipment/Purposeful Proactive Rounding/Room/bathroom lighting operational, light cord in reach

## 2023-08-06 NOTE — H&P ADULT - HISTORY OF PRESENT ILLNESS
felt nauseous and feels dizzy.    abdominal pain x 1 day. Epigastric mild pain, nonradiating, starting this AM. Constant. A/w nausea and vomiting episodes. Denies any chest pain, shortness of breath,   headaches, fevers, chills, diarrhea ,consti    use life alert   [facility] in [place] 84 yo woman with history of hypertension and dyslipidemia comes in from home for abdominal pain. Patient states to speak to her daughter for information. Patient to anxious to answer questions. Daughter unable to give much besides the patient has had abdominal pain intermittently and has had abdominal surgery. Patient was having pain and use her LifeAlert. She reports felt nauseous and feels dizzy as well. One episode of vomiting. The abdominal pain is constant. No other complaints.    In the ED CT Abdomen and Pelvis w/ IV Cont (08.06.23 @ 13:37) > Findings compatible with closed loop small bowel obstruction and ischemic loop of small bowel. Urgent surgical consultation is recommended. Patient admitted

## 2023-08-06 NOTE — ED PROVIDER NOTE - OBJECTIVE STATEMENT
83F PMHx of HTN, hysterectomy, hernia repair presenting with abdominal pain x 1 day. Epigastric mild pain, nonradiating, starting this AM. Constant. A/w nausea and vomiting episodes. Denies any chest pain, shortness of breath,   headaches, fevers, chills, diarrhea ,constipation, weakness, syncope, hematuria, dysuria, urinary symptoms, subjective neurological deficits.

## 2023-08-06 NOTE — H&P ADULT - ASSESSMENT
[place] [phone] (daughter aware of plan) 84 yo woman with history of hypertension and dyslipidemia comes in from home for abdominal pain found to have ischemia small bowel.      Intractable abdominal Pain due to Ischemic Bowel  - CT Abdomen and Pelvis w/ IV Cont (08.06.23 @ 13:37) > Findings compatible with closed loop small bowel obstruction and ischemic loop of small bowel.  - Surgery on board for urgent surgery.  - NPO  - Continue IVFs  - Pain meds PRN: Tylenol, Morphine, Morphine  - EKG shows SB @53, 1degree AVD, LVH, qtc 403    Hypertension  - Home Meds: amlodipine 5mg PO BID, HCTZ 12.5mg PO daily, lisinopril 40mg PO daily  - Continue amlodipine 5mg BID, hold other BP meds    Dyslipidemia  -f/u lipid panel  -continue zetia 10mg daily    DVT Prophylaxis held due to surgical intervention        [place] [phone] (daughter aware of plan) 82 yo woman with history of hypertension and dyslipidemia comes in from home for abdominal pain found to have ischemia small bowel.    Intractable abdominal Pain due to Ischemic Bowel  - CT Abdomen and Pelvis w/ IV Cont (08.06.23 @ 13:37) > Findings compatible with closed loop small bowel obstruction and ischemic loop of small bowel.  - initial lactate 2.3, 1 dose Abx given in ED (zosyn)  - Surgery on board for urgent surgery.  - NPO  - Continue IVFs  - Pain meds PRN: Tylenol, Morphine, Morphine  - EKG shows SB @53, 1degree AVD, LVH, qtc 403    Hypertension  - Home Meds: amlodipine 5mg PO BID, HCTZ 12.5mg PO daily, lisinopril 40mg PO daily  - Continue amlodipine 5mg BID, hold other BP meds    Dyslipidemia  -f/u lipid panel  -continue zetia 10mg daily    DVT Prophylaxis held due to surgical intervention        [place] [phone] (daughter aware of plan)

## 2023-08-06 NOTE — BRIEF OPERATIVE NOTE - NSICDXBRIEFPREOP_GEN_ALL_CORE_FT
PRE-OP DIAGNOSIS:  SBO (small bowel obstruction) 06-Aug-2023 21:55:10  Matt Andres  SBO (small bowel obstruction) 06-Aug-2023 21:55:19  Matt Andres

## 2023-08-06 NOTE — PROGRESS NOTE ADULT - SUBJECTIVE AND OBJECTIVE BOX
F/U Note:    83y Female admitted POD # 0 from Diagnostic laparoscopy with laparotomy with HAROON for SBO    Interval Hx:  -post op labs show hypomag, order for repletion placed    Vital Signs Last 24 Hrs  T(C): 36.3 (06 Aug 2023 21:51), Max: 36.3 (06 Aug 2023 20:51)  T(F): 97.3 (06 Aug 2023 21:51), Max: 97.3 (06 Aug 2023 20:51)  HR: 60 (06 Aug 2023 21:51) (59 - 70)  BP: 124/56 (06 Aug 2023 21:51) (117/52 - 137/68)  BP(mean): --  RR: 18 (06 Aug 2023 21:51) (12 - 19)  SpO2: 98% (06 Aug 2023 21:51) (94% - 98%)    Parameters below as of 06 Aug 2023 20:51  Patient On (Oxygen Delivery Method): nasal cannula  O2 Flow (L/min): 3                              12.6   12.36 )-----------( 222      ( 06 Aug 2023 21:16 )             37.3         08-06    133<L>  |  99  |  17  ----------------------------<  135<H>  3.6   |  23  |  0.71    Ca    7.9<L>      06 Aug 2023 21:16  Mg     1.2     08-06    TPro  6.4  /  Alb  3.3  /  TBili  0.8  /  DBili  x   /  AST  22  /  ALT  17  /  AlkPhos  90  08-06        NEURO: no headaches, blurry vision, tremors, depression, anxity  CV: no chest pain, palpitations, murmurs, orthopnea  Resp: no shortness of breath, cough, wheeze, sputum production  GI: no stomach pain,nausea, vomitting, flatulence, hematemesis, hematochezia  PV: no swelling of extremities, no hair loss, no coolness to extremities  ENDO: no polydypsia, polyphagia, polyuria, weight loss, night sweats         NEURO: awake and alert  CV: (+) S1/S2, rrr, no mrg  RESP: CTA b/l  GI: soft, non tender, LAp site CDI

## 2023-08-06 NOTE — CONSULT NOTE ADULT - SUBJECTIVE AND OBJECTIVE BOX
83 yof PMHx of HTN, hysterectomy, hernia repair presenting with abdominal pain x 1 day. Epigastric mild pain, nonradiating, starting this AM. Constant. A/w nausea and vomiting episodes. Pt states abdominal pain started this morning with nausea and one episode of vomiting. Since then she notes abd distension and pain on RLQ. Denies any recent cardiac or neurological events. Denies any chest pain, shortness of breath,   headaches, fevers, chills, diarrhea ,constipation, weakness, syncope, hematuria, dysuria, urinary symptoms, subjective neurological deficits. no bowel function.      PAST MEDICAL & SURGICAL HISTORY:  HTN (hypertension)      Osteoarthritis      Osteoporosis      S/P hernia repair  1993      S/P CESARIO (total abdominal hysterectomy)      S/P D&C (status post dilation and curettage)  x3      MEDICATIONS  (STANDING):  lactated ringers. 1000 milliLiter(s) (100 mL/Hr) IV Continuous <Continuous>  naloxone Injectable 0.4 milliGRAM(s) IV Push once    MEDICATIONS  (PRN):  acetaminophen     Tablet .. 650 milliGRAM(s) Oral every 6 hours PRN Temp greater or equal to 38C (100.4F), Mild Pain (1 - 3)  morphine  - Injectable 2 milliGRAM(s) IV Push every 4 hours PRN Moderate Pain (4 - 6)  morphine  - Injectable 4 milliGRAM(s) IV Push every 4 hours PRN Severe Pain (7 - 10)  ondansetron Injectable 4 milliGRAM(s) IV Push every 6 hours PRN Nausea and/or Vomiting    ct scan:   IMPRESSION:  Findings compatible with closed loop small bowel obstruction and ischemic   loop of small bowel. Urgent surgical consultation is recommended.

## 2023-08-06 NOTE — ED ADULT NURSE NOTE - ISOLATION TYPE:
Anemia Management Note - Enrollment  SUBJECTIVE/OBJECTIVE:    Referred by Dr. Sarai Abrams on 2018  Primary Diagnosis: Anemia in Chronic Kidney Disease (N18.3, D63.1)     Secondary Diagnosis:  Chronic Kidney Disease, Stage 3 (N18.3)   Hgb goal range:  9-10  Epo/Darbo: none until iron stores replaced  Iron regimen:  IV iron   Labs : 2019  Recent MARIAM use, transfusion, IV iron: IV iron in   RX/TX plans : 2019  No history of stroke, MI and blood clots, HX skin cancer  Contact:  Ok to leave message regarding medical, billing and scheduling per consent to communicate dated 2013      Anemia Latest Ref Rng & Units 2016   Hemoglobin 11.7 - 15.7 g/dL 9.4(L) 10.3(L) 10.4(L) 11.3(L) 10.8(L) 10.0(L) 8.4(L)   TSAT 15 - 46 % - - - - - - 5(L)   Ferritin 8 - 252 ng/mL - - - - - - 7(L)       BP Readings from Last 3 Encounters:   18 148/77   18 168/77   17 150/77     Wt Readings from Last 2 Encounters:   18 116 lb (52.6 kg)   18 118 lb (53.5 kg)     Current Outpatient Prescriptions   Medication Sig Dispense Refill     Acetaminophen (TYLENOL PO) Take 500 mg by mouth every 6 hours as needed for mild pain or fever       allopurinol (ZYLOPRIM) 100 MG tablet Take 2 tablets (200 mg) by mouth daily 180 tablet 3     amLODIPine (NORVASC) 5 MG tablet Take 1 tablet (5 mg) by mouth daily 90 tablet 3     carvedilol (COREG) 6.25 MG tablet Take 1 tablet (6.25 mg) by mouth 2 times daily (with meals) (Needs follow-up appointment for this medication) 60 tablet 0     Emollient (VANICREAM EX) Externally apply topically 2 times daily       furosemide (LASIX) 20 MG tablet Take 1 tablet (20 mg) by mouth daily 90 tablet 3     predniSONE (DELTASONE) 1 MG tablet Take 3 tablets (3 mg) by mouth daily 270 tablet 3     sertraline (ZOLOFT) 50 MG tablet Take 1 tablet (50 mg) by mouth daily 30 tablet 11     sirolimus (GENERIC  EQUIVALENT) 0.5 MG tablet Take 4 tablets (2 mg) by mouth daily 360 tablet 3     triamcinolone (KENALOG) 0.1 % ointment Apply to itchy rash twice daily for 7 to 10 days as needed. 500 g 3     ursodiol (ACTIGALL) 300 MG capsule TAKE THREE CAPSULES BY MOUTH EVERY DAY (Patient taking differently: 600 mg in AM and 300 mg HS) 270 capsule 3     ASSESSMENT:  Hgb Not at goal/Initiation of therapy   Ferritin: Not at goal of (>100ng/mL)  TSat: not at goal of >30%  Iron regimen recommended: IV iron  Recommended MARIAM regimen: once iron replaced  Blood Pressure: OK for MARIAM    PLAN:  1. Patient called today for enrollment in Anemia Management Service.  2. Discussed:  anemia overview, monitoring service, goal hemoglobin range and rationale and risks of MARIAM blood clots, stroke, increase in blood pressure and cancer  3. Dose location: in clinic [place]  4. Labs: [place]  5. Pharmacy: [place]  6. Send new patient letter with medication guide to patient.     Left detailed message stating [place] plan placed to set appointment for IV iron, reviewed low iron results.      Amanda called back 04/19/2018, discussed iron therapy, Aranesp, checked coverage.  Injectafer covered, and Amanda scheduled in for 05/02/2018 & 05/09/2018 in [place]. MAjJ    Next call date:  05/03/2018     Anemia Management Service  Yola Rangel,DoreenD, BCACP and Nini Alfredo CPhT  Phone: [phone]  Fax: [phone]       None

## 2023-08-06 NOTE — PROGRESS NOTE ADULT - ASSESSMENT
83y Female admitted POD # 0 from Diagnostic laparoscopy with laparotomy with HAROON for SBO    Interval Hx:  -post op labs show hypomag, order for repletion placed      PLAN:    -serial exams, any changes in exam to be escelated to surgical team immedietly  -pain control  -finish post op anbx  -IVFs until taking adequate PO  -advance diet per surgical team  -dressing changes per surgical team  -will recieve 2 grasm amg sulfate now  -DVT prophylaxis  -AM labs         TIME SPENT:  35 minutes of  time spent providing medical care for patient's acute illness/conditions that impairs at least one vital organ system and/or poses a high risk of imminent or life threatening deterioration in the patient's condition. It includes time spent evaluating and treating the patient's acute illness as well as time spent reviewing labs, radiology, discussing goals of care with patient and/or patient's family, and discussing the case with a multidisciplinary team, including the eICU, in an effort to prevent further life threatening deterioration or end organ damage. This time is independent of any procedures performed.

## 2023-08-07 LAB
ALBUMIN SERPL ELPH-MCNC: 2.6 G/DL — LOW (ref 3.3–5)
ALP SERPL-CCNC: 79 U/L — SIGNIFICANT CHANGE UP (ref 40–120)
ALT FLD-CCNC: 14 U/L — SIGNIFICANT CHANGE UP (ref 10–45)
ANION GAP SERPL CALC-SCNC: 8 MMOL/L — SIGNIFICANT CHANGE UP (ref 5–17)
AST SERPL-CCNC: 24 U/L — SIGNIFICANT CHANGE UP (ref 10–40)
BASOPHILS # BLD AUTO: 0.02 K/UL — SIGNIFICANT CHANGE UP (ref 0–0.2)
BASOPHILS NFR BLD AUTO: 0.2 % — SIGNIFICANT CHANGE UP (ref 0–2)
BILIRUB SERPL-MCNC: 0.9 MG/DL — SIGNIFICANT CHANGE UP (ref 0.2–1.2)
BUN SERPL-MCNC: 12 MG/DL — SIGNIFICANT CHANGE UP (ref 7–23)
CALCIUM SERPL-MCNC: 8.3 MG/DL — LOW (ref 8.4–10.5)
CHLORIDE SERPL-SCNC: 102 MMOL/L — SIGNIFICANT CHANGE UP (ref 96–108)
CHOLEST SERPL-MCNC: 176 MG/DL — SIGNIFICANT CHANGE UP
CO2 SERPL-SCNC: 26 MMOL/L — SIGNIFICANT CHANGE UP (ref 22–31)
CREAT SERPL-MCNC: 0.73 MG/DL — SIGNIFICANT CHANGE UP (ref 0.5–1.3)
EGFR: 81 ML/MIN/1.73M2 — SIGNIFICANT CHANGE UP
EOSINOPHIL # BLD AUTO: 0 K/UL — SIGNIFICANT CHANGE UP (ref 0–0.5)
EOSINOPHIL NFR BLD AUTO: 0 % — SIGNIFICANT CHANGE UP (ref 0–6)
GLUCOSE SERPL-MCNC: 131 MG/DL — HIGH (ref 70–99)
HCT VFR BLD CALC: 34.9 % — SIGNIFICANT CHANGE UP (ref 34.5–45)
HDLC SERPL-MCNC: 99 MG/DL — SIGNIFICANT CHANGE UP
HGB BLD-MCNC: 12.1 G/DL — SIGNIFICANT CHANGE UP (ref 11.5–15.5)
IMM GRANULOCYTES NFR BLD AUTO: 0.2 % — SIGNIFICANT CHANGE UP (ref 0–0.9)
LIPID PNL WITH DIRECT LDL SERPL: 68 MG/DL — SIGNIFICANT CHANGE UP
LYMPHOCYTES # BLD AUTO: 0.63 K/UL — LOW (ref 1–3.3)
LYMPHOCYTES # BLD AUTO: 5.2 % — LOW (ref 13–44)
MAGNESIUM SERPL-MCNC: 1.9 MG/DL — SIGNIFICANT CHANGE UP (ref 1.6–2.6)
MCHC RBC-ENTMCNC: 32.4 PG — SIGNIFICANT CHANGE UP (ref 27–34)
MCHC RBC-ENTMCNC: 34.7 GM/DL — SIGNIFICANT CHANGE UP (ref 32–36)
MCV RBC AUTO: 93.3 FL — SIGNIFICANT CHANGE UP (ref 80–100)
MONOCYTES # BLD AUTO: 0.36 K/UL — SIGNIFICANT CHANGE UP (ref 0–0.9)
MONOCYTES NFR BLD AUTO: 3 % — SIGNIFICANT CHANGE UP (ref 2–14)
NEUTROPHILS # BLD AUTO: 11.09 K/UL — HIGH (ref 1.8–7.4)
NEUTROPHILS NFR BLD AUTO: 91.4 % — HIGH (ref 43–77)
NON HDL CHOLESTEROL: 77 MG/DL — SIGNIFICANT CHANGE UP
NRBC # BLD: 0 /100 WBCS — SIGNIFICANT CHANGE UP (ref 0–0)
PHOSPHATE SERPL-MCNC: 3.6 MG/DL — SIGNIFICANT CHANGE UP (ref 2.5–4.5)
PLATELET # BLD AUTO: 223 K/UL — SIGNIFICANT CHANGE UP (ref 150–400)
POTASSIUM SERPL-MCNC: 3.6 MMOL/L — SIGNIFICANT CHANGE UP (ref 3.5–5.3)
POTASSIUM SERPL-SCNC: 3.6 MMOL/L — SIGNIFICANT CHANGE UP (ref 3.5–5.3)
PROT SERPL-MCNC: 5.6 G/DL — LOW (ref 6–8.3)
RBC # BLD: 3.74 M/UL — LOW (ref 3.8–5.2)
RBC # FLD: 12.7 % — SIGNIFICANT CHANGE UP (ref 10.3–14.5)
SODIUM SERPL-SCNC: 136 MMOL/L — SIGNIFICANT CHANGE UP (ref 135–145)
TRIGL SERPL-MCNC: 40 MG/DL — SIGNIFICANT CHANGE UP
WBC # BLD: 12.13 K/UL — HIGH (ref 3.8–10.5)
WBC # FLD AUTO: 12.13 K/UL — HIGH (ref 3.8–10.5)

## 2023-08-07 PROCEDURE — 99232 SBSQ HOSP IP/OBS MODERATE 35: CPT

## 2023-08-07 PROCEDURE — 99222 1ST HOSP IP/OBS MODERATE 55: CPT

## 2023-08-07 RX ORDER — ENOXAPARIN SODIUM 100 MG/ML
40 INJECTION SUBCUTANEOUS EVERY 24 HOURS
Refills: 0 | Status: DISCONTINUED | OUTPATIENT
Start: 2023-08-07 | End: 2023-08-10

## 2023-08-07 RX ADMIN — AMLODIPINE BESYLATE 5 MILLIGRAM(S): 2.5 TABLET ORAL at 10:11

## 2023-08-07 RX ADMIN — Medication 400 MILLIGRAM(S): at 01:14

## 2023-08-07 RX ADMIN — ENOXAPARIN SODIUM 40 MILLIGRAM(S): 100 INJECTION SUBCUTANEOUS at 15:26

## 2023-08-07 RX ADMIN — AMLODIPINE BESYLATE 5 MILLIGRAM(S): 2.5 TABLET ORAL at 23:19

## 2023-08-07 RX ADMIN — TRAMADOL HYDROCHLORIDE 50 MILLIGRAM(S): 50 TABLET ORAL at 15:25

## 2023-08-07 RX ADMIN — TRAMADOL HYDROCHLORIDE 50 MILLIGRAM(S): 50 TABLET ORAL at 17:15

## 2023-08-07 RX ADMIN — Medication 1000 MILLIGRAM(S): at 00:00

## 2023-08-07 NOTE — OCCUPATIONAL THERAPY INITIAL EVALUATION ADULT - GENERAL OBSERVATIONS, REHAB EVAL
Pt received seated in bedside recliner +primafit, cardiac monitor, pulse-ox. Pt's daughter present for evaluation. Alert and oriented x4 and tolerated OT without difficulty. Pt left supine in bed with lines intact, call bell in reach, HOB at 45', and daughter present. PCA to notify nurse that pt's daughter is requesting to speak to social work and that pt with anxiety/mild SOB (02 saturation normal)

## 2023-08-07 NOTE — CONSULT NOTE ADULT - ASSESSMENT
------------------------------------------------------------------------------------------------  ASSESSMENT/PLAN  This is a 84 YO woman with PMH of HTN dyslipidemia, hysterectomy, hernia repair who presents to [facility] on  8/6 from home with c/o abdominal pain.  Imaging showed closed loop small bowel obstruction and ischemic loop of small bowel.  she was taken ton the OR on 8/6 for laparoscopic laparotomy and HAROON.  Medical management per hospitalist  Pain management -Tylenol, tramadol  DVT PPX : SCDs, lovenox  Bowel Regimen: consider adding bowel regimen    Rehab Disposition: - Acute Inpatient Rehab     Recommend ACUTE inpatient rehabilitation for the functional deficits consisting of 3 hours of therapy/day & 24 hour RN/daily PMR physician for comorbid medical management. Will continue to follow for ongoing rehab needs and recommendations. Patient will be able to tolerate 3 hours a day.           ------------------------------------------------------------------------------------------------  ASSESSMENT/PLAN  This is a 82 YO woman with PMH of HTN dyslipidemia, hysterectomy, hernia repair who presents to [facility] on  8/6 from home with c/o abdominal pain.  Imaging showed closed loop small bowel obstruction and ischemic loop of small bowel. She was taken ton the OR on 8/6 for laparoscopic laparotomy and HAROON.  Medical management per hospitalist  Pain management -Tylenol, tramadol  DVT PPX : SCDs, Lovenox  Bowel Regimen: consider adding bowel regimen    Rehab Disposition: - Acute Inpatient Rehab     Recommend ACUTE inpatient rehabilitation for the functional deficits consisting of 3 hours of therapy/day & 24 hour RN/daily PMR physician for comorbid medical management. Will continue to follow for ongoing rehab needs and recommendations. Patient will be able to tolerate 3 hours a day.

## 2023-08-07 NOTE — PATIENT PROFILE ADULT - FUNCTIONAL ASSESSMENT - BASIC MOBILITY ASSESSMENT TYPE
Detail Level: Detailed Additional Notes: Liquid nitrogen cryotherapy of this needs to be scheduled Admission

## 2023-08-07 NOTE — PROGRESS NOTE ADULT - SUBJECTIVE AND OBJECTIVE BOX
F/U Note:    83y Female admitted POD # 1 from Diagnostic laparoscopy with laparotomy with HAROON for SBO    Interval Hx:  -no acute issues reported     Vital Signs Last 24 Hrs  T(C): 36.3 (07 Aug 2023 16:22), Max: 36.6 (07 Aug 2023 05:44)  T(F): 97.4 (07 Aug 2023 16:22), Max: 97.8 (07 Aug 2023 05:44)  HR: 90 (07 Aug 2023 16:22) (60 - 90)  BP: 151/70 (07 Aug 2023 16:22) (117/52 - 151/70)  BP(mean): --  RR: 20 (07 Aug 2023 16:22) (12 - 20)  SpO2: 95% (07 Aug 2023 16:22) (95% - 98%)    Parameters below as of 07 Aug 2023 03:30  Patient On (Oxygen Delivery Method): nasal cannula  O2 Flow (L/min): 2                              12.1   12.13 )-----------( 223      ( 07 Aug 2023 07:46 )             34.9         08-07    136  |  102  |  12  ----------------------------<  131<H>  3.6   |  26  |  0.73    Ca    8.3<L>      07 Aug 2023 07:46  Phos  3.6     08-07  Mg     1.9     08-07    TPro  5.6<L>  /  Alb  2.6<L>  /  TBili  0.9  /  DBili  x   /  AST  24  /  ALT  14  /  AlkPhos  79  08-07        NEURO: no headaches, blurry vision, tremors, depression, anxity  CV: no chest pain, palpitations, murmurs, orthopnea  Resp: no shortness of breath, cough, wheeze, sputum production  GI: no stomach pain,nausea, vomitting, flatulence, hematemesis, hematochezia  PV: no swelling of extremities, no hair loss, no coolness to extremities  ENDO: no polydypsia, polyphagia, polyuria, weight loss, night sweats          NEURO: awake and alert  CV: (+) S1/S2, rrr, no mrg  RESP: CTA b/l  GI: soft, non tender

## 2023-08-07 NOTE — OCCUPATIONAL THERAPY INITIAL EVALUATION ADULT - PLANNED THERAPY INTERVENTIONS, OT EVAL
ADL retraining/balance training/bed mobility training/cognitive, visual perceptual/joint mobilization/massage/parent/caregiver training.../ROM/strengthening/stretching/transfer training

## 2023-08-07 NOTE — PATIENT PROFILE ADULT - FALL HARM RISK - HARM RISK INTERVENTIONS

## 2023-08-07 NOTE — PROGRESS NOTE ADULT - ASSESSMENT
83 year old female with hx of htn, presented to the ER with abdominal pain, w/u and underwent Laparotomy /HAROON .   Patient is surgically stable this am POD #1   -- Labs stable   --- Pain managed     Discussed with Dr Chisholm     Plan:   OOB with PT WBAT  - patient has severe spinal stenosis             Incentive spirometer             DVT prophylaxis             clear liquids as tolerated             check labs in am

## 2023-08-07 NOTE — OCCUPATIONAL THERAPY INITIAL EVALUATION ADULT - PERTINENT HX OF CURRENT PROBLEM, REHAB EVAL
82 yo woman with history of hypertension and dyslipidemia comes in from home for abdominal pain found to have ischemia small bowel s/p sx

## 2023-08-07 NOTE — PATIENT PROFILE ADULT - HISTORY OF COVID-19 VACCINATION
From: Daniel Olivas  To: Joseluis Higgins MD  Sent: 6/23/2021 11:44 AM EDT  Subject: Non-Urgent Medical Question    I think I am at the beginning stages of getting a sinus infection.  Runny nose, yellow stuff coming out, sinus headache, lack of energetic and motivation
Vaccine status unknown

## 2023-08-07 NOTE — OCCUPATIONAL THERAPY INITIAL EVALUATION ADULT - ADDITIONAL COMMENTS
As per pt and daughter, pt lives alone in  with 1+1 ROBERT and 1st floor set-up with bedroom and bathroom with tub shower. Pt was independent with BADLs however, had , landscapers, etc. for IADLs. Pt reports she only used a cane prior to admission.

## 2023-08-07 NOTE — PHYSICAL THERAPY INITIAL EVALUATION ADULT - PERTINENT HX OF CURRENT PROBLEM, REHAB EVAL
82 yo woman with history of hypertension and dyslipidemia comes in from home for abdominal pain. Patient states to speak to her daughter for information. Patient to anxious to answer questions. Daughter unable to give much besides the patient has had abdominal pain intermittently and has had abdominal surgery. Patient was having pain and use her LifeAlert. She reports felt nauseous and feels dizzy as well. One episode of vomiting. The abdominal pain is constant. No other complaints.

## 2023-08-07 NOTE — PROGRESS NOTE ADULT - SUBJECTIVE AND OBJECTIVE BOX
Patient is a 83y old  Female who presented with a chief complaint of Abdominal Pain (07 Aug 2023 11:02)  Patient admitted yesterday 8/6 and underwent Laparoscopy/Laparotomy/ HAROON  POD  # 1  Patient seen and examined at bedside., along with Dr Chisholm   Doing well , pain managed, denies nausea or vomiting , -Flatus, -BM  Denies SOB/CP     ALLERGIES:  No Known Allergies    MEDICATIONS  (STANDING):  amLODIPine   Tablet 5 milliGRAM(s) Oral <User Schedule>  lactated ringers. 1000 milliLiter(s) (100 mL/Hr) IV Continuous <Continuous>    MEDICATIONS  (PRN):  HYDROmorphone  Injectable 1 milliGRAM(s) IV Push every 4 hours PRN Severe Pain (7 - 10)  ondansetron Injectable 4 milliGRAM(s) IV Push every 6 hours PRN Nausea  traMADol 50 milliGRAM(s) Oral every 4 hours PRN Moderate Pain (4 - 6)    Vital Signs Last 24 Hrs  T(F): 97.8 (07 Aug 2023 05:44), Max: 97.8 (07 Aug 2023 05:44)  HR: 78 (07 Aug 2023 10:00) (60 - 78)  BP: 146/61 (07 Aug 2023 10:00) (117/52 - 146/61)  RR: 19 (07 Aug 2023 05:44) (12 - 19)  SpO2: 98% (07 Aug 2023 03:30) (97% - 98%)  I&O's Summary    06 Aug 2023 07:01  -  07 Aug 2023 07:00  --------------------------------------------------------  IN: 1100 mL / OUT: 700 mL / NET: 400 mL      PHYSICAL EXAM:  General: NAD, A/O x 3,   ENT: MMM  Neck: Supple, No JVD  Lungs: Clear to auscultation bilaterally  Cardio: RRR, S1/S2, No murmurs  Abdomen: Softly distended, TTP along suture line, no ecchymosis or erthema noted  Hypoactive Bowel sounds present, - Flatus . , voiding adequately   Extremities: No calf tenderness, No pitting edema    LABS:                        12.1   12.13 )-----------( 223      ( 07 Aug 2023 07:46 )             34.9     08-07    136  |  102  |  12  ----------------------------<  131  3.6   |  26  |  0.73    Ca    8.3      07 Aug 2023 07:46  Phos  3.6     08-07  Mg     1.9     08-07    TPro  5.6  /  Alb  2.6  /  TBili  0.9  /  DBili  x   /  AST  24  /  ALT  14  /  AlkPhos  79  08-07      PT/INR - ( 06 Aug 2023 11:15 )   PT: 11.0 sec;   INR: 0.96 ratio         PTT - ( 06 Aug 2023 11:15 )  PTT:25.6 sec  Lactate, Blood: 0.9 mmol/L (08-06 @ 21:16)  Lactate, Blood: 2.3 mmol/L (08-06 @ 11:15)    CARDIAC MARKERS ( 06 Aug 2023 12:00 )  x     / 6.4 ng/L / x     / x     / x          08-07 Chol 176 mg/dL LDL -- HDL 99 mg/dL Trig 40 mg/dL    Urinalysis Basic - ( 07 Aug 2023 07:46 )    Color: x / Appearance: x / SG: x / pH: x  Gluc: 131 mg/dL / Ketone: x  / Bili: x / Urobili: x   Blood: x / Protein: x / Nitrite: x   Leuk Esterase: x / RBC: x / WBC x   Sq Epi: x / Non Sq Epi: x / Bacteria: x      Care Discussed with Consultants/Other Providers:

## 2023-08-07 NOTE — PROGRESS NOTE ADULT - ASSESSMENT
83y Female admitted POD # 1 from Diagnostic laparoscopy with laparotomy with HAROON for SBO    Interval Hx:  -no acute issues reported         PLAN:    -serial exams, any changes in exam to be escelated to surgical team immedietly  -pain control  -finished post op anbx  -started pn clear liquid diet  -dressing changes per surgical team  -DVT prophylaxis  -AM labs         TIME SPENT:  35 minutes of  time spent providing medical care for patient's acute illness/conditions that impairs at least one vital organ system and/or poses a high risk of imminent or life threatening deterioration in the patient's condition. It includes time spent evaluating and treating the patient's acute illness as well as time spent reviewing labs, radiology, discussing goals of care with patient and/or patient's family, and discussing the case with a multidisciplinary team, including the eICU, in an effort to prevent further life threatening deterioration or end organ damage. This time is independent of any procedures performed.

## 2023-08-07 NOTE — CONSULT NOTE ADULT - SUBJECTIVE AND OBJECTIVE BOX
Patient is a 83y old  Female who presents with a chief complaint of Abdominal Pain (07 Aug 2023 11:18)    HPI:  This is a 82 YO woman with PMH of HTN dyslipidemia, hysterectomy, hernia repair who presents to [facility] on  8/6 from home for abdominal pain. Patient was to anxious to answer questions.  patient has had abdominal pain intermittently and has had abdominal surgery. Patient was having pain and use her LifeAlert. She reports felt nauseous and feels dizzy as well. One episode of vomiting. The abdominal pain is constant. No other complaints.  In the ED CT Abdomen and Pelvis w/ IV Cont  Findings compatible with closed loop small bowel obstruction and ischemic loop of small bowel. Urgent surgical consultation  recommended and she was admitted.  she was taken ton the OR on 8/6 for laparoscopic laparotomy and HAROON. No post surgical complications.    Patient was seen in room. she c/o back pain , left ankle pain, and abdominal pain with movement. she denies headache,  chest pain, cough, fever, chills.    REVIEW OF SYSTEMS  Constitutional: No fever, No Chills, + fatigue  HEENT: No eye pain, No visual disturbances, No difficulty hearing  Pulm: No cough,  No shortness of breath  Cardio: No chest pain, No palpitations  GI:  No abdominal pain, No nausea, No vomiting, No diarrhea, No constipation  : No dysuria, No frequency, No hematuria  Neuro: No headaches, No memory loss, + loss of strength, no numbness, No tremors  Skin: No itching, No rashes, No lesions   Endo: No temperature intolerance  MSK: No joint pain, No joint swelling, No muscle pain, No Neck pain, + back pain  Psych:  No depression, No anxiety      PAST MEDICAL & SURGICAL HISTORY  HTN (hypertension)    Osteoarthritis    Osteoporosis    S/P hernia repair    S/P CESARIO (total abdominal hysterectomy)    S/P D&C (status post dilation and curettage)    FAMILY HISTORY   Family history of dementia (Father)    Family history of diabetes mellitus type II (Mother)    Family history of cancer (Sibling)    SOCIAL HISTORY  Smoking - Denies  EtOH -  Denies  Drugs -  Denies    FUNCTIONAL HISTORY:   Patient lives alone in  with 1+1 ROBERT and 1st floor set-up with bedroom and bathroom with tub shower.  PTA: Independent in ADLs and ambulation     Marital Status:     CURRENT FUNCTIONAL STATUS  Date: 8/7  Bed Mobility: min a, 2 person  Transfers: min a, 2 person  Gait: min a, 10ft with RW 1+1 person  Upper Body Dressing: Min a, 1 person  Lower Body Dressing: Max a, 1 person  Toileting: Max a, 1 person    RECENT LABS/IMAGING  CBC Full  -  ( 07 Aug 2023 07:46 )  WBC Count : 12.13 K/uL  RBC Count : 3.74 M/uL  Hemoglobin : 12.1 g/dL  Hematocrit : 34.9 %  Platelet Count - Automated : 223 K/uL  Mean Cell Volume : 93.3 fl  Mean Cell Hemoglobin : 32.4 pg  Mean Cell Hemoglobin Concentration : 34.7 gm/dL  Auto Neutrophil # : 11.09 K/uL  Auto Lymphocyte # : 0.63 K/uL  Auto Monocyte # : 0.36 K/uL  Auto Eosinophil # : 0.00 K/uL  Auto Basophil # : 0.02 K/uL  Auto Neutrophil % : 91.4 %  Auto Lymphocyte % : 5.2 %  Auto Monocyte % : 3.0 %  Auto Eosinophil % : 0.0 %  Auto Basophil % : 0.2 %    08-07    136  |  102  |  12  ----------------------------<  131<H>  3.6   |  26  |  0.73    Ca    8.3<L>      07 Aug 2023 07:46  Phos  3.6     08-07  Mg     1.9     08-07    TPro  5.6<L>  /  Alb  2.6<L>  /  TBili  0.9  /  DBili  x   /  AST  24  /  ALT  14  /  AlkPhos  79  08-07    Urinalysis Basic - ( 07 Aug 2023 07:46 )    Color: x / Appearance: x / SG: x / pH: x  Gluc: 131 mg/dL / Ketone: x  / Bili: x / Urobili: x   Blood: x / Protein: x / Nitrite: x   Leuk Esterase: x / RBC: x / WBC x   Sq Epi: x / Non Sq Epi: x / Bacteria: x    VITALS  T(C): 36.3 (08-07-23 @ 16:22), Max: 36.6 (08-07-23 @ 05:44)  HR: 90 (08-07-23 @ 16:22) (60 - 90)  BP: 151/70 (08-07-23 @ 16:22) (117/52 - 151/70)  RR: 20 (08-07-23 @ 16:22) (12 - 20)  SpO2: 95% (08-07-23 @ 16:22) (95% - 98%)  Wt(kg): --    ALLERGIES  No Known Allergies      MEDICATIONS   amLODIPine   Tablet 5 milliGRAM(s) Oral <User Schedule>  enoxaparin Injectable 40 milliGRAM(s) SubCutaneous every 24 hours  HYDROmorphone  Injectable 1 milliGRAM(s) IV Push every 4 hours PRN  lactated ringers. 1000 milliLiter(s) IV Continuous <Continuous>  ondansetron Injectable 4 milliGRAM(s) IV Push every 6 hours PRN  traMADol 50 milliGRAM(s) Oral every 4 hours PRN      ----------------------------------------------------------------------------------------  PHYSICAL EXAM  Gen - NAD, Comfortable  HEENT - NCAT, EOMI, MMM  Neck - Supple, No limited ROM  Pulm - CTAB, No wheeze, No rhonchi, No crackles  Cardiovascular - RRR, S1S2  Abdomen - Soft,+ tender around incision sites, +BS  Extremities - No clubbing, no cyanosis, no peripheral edema, no calf tenderness  Neuro-     Cognitive - Awake, Alert, Oriented  to self, place, date, year, situation. Able  to follow command     Communication - Fluent, Comprehensible     Attention: Intact      Judgement: Good evidence of judgement     Memory: Recall 3 objects immediate and 3 min later         Cranial Nerves - CN 2-12 intact     Motor -                     LEFT    UE - 4/5                    RIGHT UE - 4/5                    LEFT    LE - 3/5                    RIGHT LE - 3/5        Sensory - Intact to LT     Reflexes - DTR Intact, No primitive reflexive     Tone - normal  Psychiatric - Mood stable, Affect WNL  Skin:  abdominal surgical incisions x 4 with surgiglue  Wounds: None Present                Patient is a 83y old  Female who presents with a chief complaint of Abdominal Pain (07 Aug 2023 11:18)    HPI:  This is a 84 YO woman with PMH of HTN dyslipidemia, hysterectomy, hernia repair who presents to [facility] on  8/6 from home for abdominal pain. Patient was to anxious to answer questions.  patient has had abdominal pain intermittently and has had abdominal surgery. Patient was having pain and use her LifeAlert. She reports felt nauseous and feels dizzy as well. One episode of vomiting. The abdominal pain is constant. No other complaints.  In the ED CT Abdomen and Pelvis w/ IV Cont  Findings compatible with closed loop small bowel obstruction and ischemic loop of small bowel. Urgent surgical consultation  recommended and she was admitted.  she was taken ton the OR on 8/6 for laparoscopic laparotomy and HAROON. No post surgical complications.    Patient was seen in room. she c/o back pain , left ankle pain, and abdominal pain with movement. she denies headache,  chest pain, cough, fever, chills.    REVIEW OF SYSTEMS  Constitutional: No fever, No Chills, + fatigue  HEENT: No eye pain, No visual disturbances, No difficulty hearing  Pulm: No cough,  No shortness of breath  Cardio: No chest pain, No palpitations  GI:  + abdominal tenderness, No nausea, No vomiting, No diarrhea, No constipation  : No dysuria, No frequency, No hematuria  Neuro: No headaches, No memory loss, + loss of strength, no numbness, No tremors  Skin: No itching, No rashes, No lesions   Endo: No temperature intolerance  MSK: No joint pain, No joint swelling, No muscle pain, No Neck pain, + back pain  Psych:  No depression, No anxiety      PAST MEDICAL & SURGICAL HISTORY  HTN (hypertension)    Osteoarthritis    Osteoporosis    S/P hernia repair    S/P CESARIO (total abdominal hysterectomy)    S/P D&C (status post dilation and curettage)    FAMILY HISTORY   Family history of dementia (Father)    Family history of diabetes mellitus type II (Mother)    Family history of cancer (Sibling)    SOCIAL HISTORY  Smoking - Denies  EtOH -  Denies  Drugs -  Denies    FUNCTIONAL HISTORY:   Patient lives alone in  with 1+1 ROBERT and 1st floor set-up with bedroom and bathroom with tub shower.  PTA: Independent in ADLs and ambulation     Marital Status:     CURRENT FUNCTIONAL STATUS  Date: 8/7  Bed Mobility: min a, 2 person  Transfers: min a, 2 person  Gait: min a, 10ft with RW 1+1 person  Upper Body Dressing: Min a, 1 person  Lower Body Dressing: Max a, 1 person  Toileting: Max a, 1 person    RECENT LABS/IMAGING  CBC Full  -  ( 07 Aug 2023 07:46 )  WBC Count : 12.13 K/uL  RBC Count : 3.74 M/uL  Hemoglobin : 12.1 g/dL  Hematocrit : 34.9 %  Platelet Count - Automated : 223 K/uL  Mean Cell Volume : 93.3 fl  Mean Cell Hemoglobin : 32.4 pg  Mean Cell Hemoglobin Concentration : 34.7 gm/dL  Auto Neutrophil # : 11.09 K/uL  Auto Lymphocyte # : 0.63 K/uL  Auto Monocyte # : 0.36 K/uL  Auto Eosinophil # : 0.00 K/uL  Auto Basophil # : 0.02 K/uL  Auto Neutrophil % : 91.4 %  Auto Lymphocyte % : 5.2 %  Auto Monocyte % : 3.0 %  Auto Eosinophil % : 0.0 %  Auto Basophil % : 0.2 %    08-07    136  |  102  |  12  ----------------------------<  131<H>  3.6   |  26  |  0.73    Ca    8.3<L>      07 Aug 2023 07:46  Phos  3.6     08-07  Mg     1.9     08-07    TPro  5.6<L>  /  Alb  2.6<L>  /  TBili  0.9  /  DBili  x   /  AST  24  /  ALT  14  /  AlkPhos  79  08-07    Urinalysis Basic - ( 07 Aug 2023 07:46 )    Color: x / Appearance: x / SG: x / pH: x  Gluc: 131 mg/dL / Ketone: x  / Bili: x / Urobili: x   Blood: x / Protein: x / Nitrite: x   Leuk Esterase: x / RBC: x / WBC x   Sq Epi: x / Non Sq Epi: x / Bacteria: x    VITALS  T(C): 36.3 (08-07-23 @ 16:22), Max: 36.6 (08-07-23 @ 05:44)  HR: 90 (08-07-23 @ 16:22) (60 - 90)  BP: 151/70 (08-07-23 @ 16:22) (117/52 - 151/70)  RR: 20 (08-07-23 @ 16:22) (12 - 20)  SpO2: 95% (08-07-23 @ 16:22) (95% - 98%)  Wt(kg): --    ALLERGIES  No Known Allergies      MEDICATIONS   amLODIPine   Tablet 5 milliGRAM(s) Oral <User Schedule>  enoxaparin Injectable 40 milliGRAM(s) SubCutaneous every 24 hours  HYDROmorphone  Injectable 1 milliGRAM(s) IV Push every 4 hours PRN  lactated ringers. 1000 milliLiter(s) IV Continuous <Continuous>  ondansetron Injectable 4 milliGRAM(s) IV Push every 6 hours PRN  traMADol 50 milliGRAM(s) Oral every 4 hours PRN  ----------------------------------------------------------------------------------------  PHYSICAL EXAM  Gen - NAD, Comfortable  HEENT - NCAT, EOMI, MMM  Neck - Supple, No limited ROM  Pulm - CTAB, No wheeze, No rhonchi, No crackles  Cardiovascular - RRR, S1S2  Abdomen - Soft,+ tender around incision sites, +BS  Extremities - No clubbing, no cyanosis, no peripheral edema, no calf tenderness  Neuro-     Cognitive - Awake, Alert, Oriented  to self, place, date, year, situation. Able  to follow command     Communication - Fluent, Comprehensible     Attention: Intact      Judgement: Good evidence of judgement     Memory: Recall 3 objects immediate and 3 min later         Cranial Nerves - CN 2-12 intact     Motor -                     LEFT    UE - 4/5                    RIGHT UE - 4/5                    LEFT    LE - 3/5                    RIGHT LE - 3/5        Sensory - Intact to LT     Reflexes - DTR Intact, No primitive reflexive     Tone - normal  Psychiatric - Mood stable, Affect WNL  Skin:  abdominal surgical incisions x 4 with surgiglue

## 2023-08-07 NOTE — GOALS OF CARE CONVERSATION - ADVANCED CARE PLANNING - CONVERSATION DETAILS
Met with patient at bedside. She is A&Ox3. Here from home with abdominal pain/SBO. PCP Dr. Godwin. States she has not designated a HCP in the past. She sihned a HCP form designating daughter Wilmer as HCP, and son Justyn as alternate agent. Copy placed in chart, and original given to patient.

## 2023-08-07 NOTE — PROGRESS NOTE ADULT - ASSESSMENT
84 yo woman with history of hypertension and dyslipidemia comes in from home for abdominal pain found to have ischemia small bowel.    Intractable abdominal Pain due to Ischemic Bowel  POD#1 Diagnostic laparoscopy with laparotomy with HAROON for SBO  - CT abd: Findings compatible with closed loop small bowel obstruction and ischemic loop of small bowel.  - initial lactate 2.3, 1 dose Abx given in ED (zosyn)  - slowly advance diet as per surgery   - Pain meds PRN    Hypertension  - Home Meds: amlodipine 5mg PO BID, HCTZ 12.5mg PO daily, lisinopril 40mg PO daily  - Continue amlodipine 5mg BID, hold other BP meds    Dyslipidemia  -f/u lipid panel  -continue zetia 10mg daily    DVT Prophylaxis held due to surgical intervention.       [place] [phone] (daughter aware of plan)

## 2023-08-07 NOTE — PHYSICAL THERAPY INITIAL EVALUATION ADULT - ADDITIONAL COMMENTS
pt lives alone in private house, 1 jacklyn w/rail, 1st floor setup. pt uses cane to ambulate, has RW. pt independent w/most ADL's, does not drive.

## 2023-08-08 ENCOUNTER — TRANSCRIPTION ENCOUNTER (OUTPATIENT)
Age: 83
End: 2023-08-08

## 2023-08-08 LAB
ANION GAP SERPL CALC-SCNC: 6 MMOL/L — SIGNIFICANT CHANGE UP (ref 5–17)
BUN SERPL-MCNC: 13 MG/DL — SIGNIFICANT CHANGE UP (ref 7–23)
CALCIUM SERPL-MCNC: 8.6 MG/DL — SIGNIFICANT CHANGE UP (ref 8.4–10.5)
CHLORIDE SERPL-SCNC: 100 MMOL/L — SIGNIFICANT CHANGE UP (ref 96–108)
CO2 SERPL-SCNC: 29 MMOL/L — SIGNIFICANT CHANGE UP (ref 22–31)
CREAT SERPL-MCNC: 0.66 MG/DL — SIGNIFICANT CHANGE UP (ref 0.5–1.3)
EGFR: 86 ML/MIN/1.73M2 — SIGNIFICANT CHANGE UP
GLUCOSE SERPL-MCNC: 89 MG/DL — SIGNIFICANT CHANGE UP (ref 70–99)
HCT VFR BLD CALC: 37.7 % — SIGNIFICANT CHANGE UP (ref 34.5–45)
HGB BLD-MCNC: 13.2 G/DL — SIGNIFICANT CHANGE UP (ref 11.5–15.5)
MCHC RBC-ENTMCNC: 32.6 PG — SIGNIFICANT CHANGE UP (ref 27–34)
MCHC RBC-ENTMCNC: 35 GM/DL — SIGNIFICANT CHANGE UP (ref 32–36)
MCV RBC AUTO: 93.1 FL — SIGNIFICANT CHANGE UP (ref 80–100)
NRBC # BLD: 0 /100 WBCS — SIGNIFICANT CHANGE UP (ref 0–0)
PLATELET # BLD AUTO: 249 K/UL — SIGNIFICANT CHANGE UP (ref 150–400)
POTASSIUM SERPL-MCNC: 3.3 MMOL/L — LOW (ref 3.5–5.3)
POTASSIUM SERPL-SCNC: 3.3 MMOL/L — LOW (ref 3.5–5.3)
RBC # BLD: 4.05 M/UL — SIGNIFICANT CHANGE UP (ref 3.8–5.2)
RBC # FLD: 13.2 % — SIGNIFICANT CHANGE UP (ref 10.3–14.5)
SODIUM SERPL-SCNC: 135 MMOL/L — SIGNIFICANT CHANGE UP (ref 135–145)
WBC # BLD: 11.43 K/UL — HIGH (ref 3.8–10.5)
WBC # FLD AUTO: 11.43 K/UL — HIGH (ref 3.8–10.5)

## 2023-08-08 PROCEDURE — 99232 SBSQ HOSP IP/OBS MODERATE 35: CPT | Mod: GC

## 2023-08-08 RX ORDER — POTASSIUM CHLORIDE 20 MEQ
20 PACKET (EA) ORAL
Refills: 0 | Status: COMPLETED | OUTPATIENT
Start: 2023-08-08 | End: 2023-08-08

## 2023-08-08 RX ADMIN — Medication 20 MILLIEQUIVALENT(S): at 16:46

## 2023-08-08 RX ADMIN — AMLODIPINE BESYLATE 5 MILLIGRAM(S): 2.5 TABLET ORAL at 06:39

## 2023-08-08 RX ADMIN — ENOXAPARIN SODIUM 40 MILLIGRAM(S): 100 INJECTION SUBCUTANEOUS at 16:46

## 2023-08-08 RX ADMIN — Medication 20 MILLIEQUIVALENT(S): at 11:32

## 2023-08-08 NOTE — ED ADULT TRIAGE NOTE - NS ED NURSE BANDS TYPE
Patient on schedule for 8/11/2023
Patients wife called and said that patient has white all down is throat and started having shortness of breath starting last week along with the white color down his throat. Patient is not coughing a whole lot and very little phlegm coming up said its white and clear. Patient was put on a 2 week antibiotic for 2 weeks and last dose will be this Friday. Patient going to see PCP today.        [id number] [address] White: [phone]
Schedule an appt for the patient with me on Friday, 8/11/23 at 1 PM.
Name band;

## 2023-08-08 NOTE — DISCHARGE NOTE PROVIDER - CARE PROVIDER_API CALL
Skip Godwin.  Internal Medicine  [address] [id number]  Phone: [phone]  Fax: [phone]  Follow Up Time:    Skip Godwin  Internal Medicine  [address] [place] [address]  Phone: [phone]  Fax: [phone]  Follow Up Time:     Simon Chisholm  Surgery  [address] [facility], [address]  [place] [address]  Phone: [phone]  Fax: [phone]  Follow Up Time:

## 2023-08-08 NOTE — DISCHARGE NOTE PROVIDER - PROVIDER TOKENS
PROVIDER:[TOKEN:[[id number]]] PROVIDER:[TOKEN:[[id number]]],PROVIDER:[TOKEN:[[id number]]]

## 2023-08-08 NOTE — PROGRESS NOTE ADULT - SUBJECTIVE AND OBJECTIVE BOX
83 yof pmh of htn, presented to the ER with abdominal pain, w/u and underwent Dx Lap HAROON POD 2.  This morning pt comfortable admits to chronic neck and back pain, states her abd feels better, + flatus, denies bowel movement, N/V/CP/SOB.      PAST MEDICAL & SURGICAL HISTORY:  HTN (hypertension)      Osteoarthritis      Osteoporosis      S/P hernia repair  1993      S/P CESARIO (total abdominal hysterectomy)      S/P D&C (status post dilation and curettage)  x3      MEDICATIONS  (STANDING):  amLODIPine   Tablet 5 milliGRAM(s) Oral <User Schedule>  enoxaparin Injectable 40 milliGRAM(s) SubCutaneous every 24 hours  lactated ringers. 1000 milliLiter(s) (100 mL/Hr) IV Continuous <Continuous>    MEDICATIONS  (PRN):  HYDROmorphone  Injectable 1 milliGRAM(s) IV Push every 4 hours PRN Severe Pain (7 - 10)  ondansetron Injectable 4 milliGRAM(s) IV Push every 6 hours PRN Nausea  traMADol 50 milliGRAM(s) Oral every 4 hours PRN Moderate Pain (4 - 6)    Gen: Awake in NAD  abd: softly distended, tender on incision site, clean dry, non guarding   : voiding   LE: calfs soft, nt, no swelling bilat                            13.2   11.43 )-----------( 249      ( 08 Aug 2023 06:20 )             37.7   08-08    135  |  100  |  13  ----------------------------<  89  3.3<L>   |  29  |  0.66    Ca    8.6      08 Aug 2023 06:20  Phos  3.6     08-07  Mg     1.9     08-07    TPro  5.6<L>  /  Alb  2.6<L>  /  TBili  0.9  /  DBili  x   /  AST  24  /  ALT  14  /  AlkPhos  79  08-07

## 2023-08-08 NOTE — DISCHARGE NOTE PROVIDER - HOSPITAL COURSE
82 yo woman with history of hypertension and dyslipidemia comes in from home for abdominal pain found to have ischemia small bowel. 8/6/23 s/p diagnostic laparoscopy with laparotomy with HAROON for SBO. CT abd: Findings compatible with closed loop small bowel obstruction and ischemic loop of small bowel. Patient tolerating diet.       Jennifer [phone] (daughter aware of plan)      Code Status:     Discharging Provider:  Osmel Bryant NP  Contact Info: [phone]. Please call with any questions or concerns.    Outpatient Provider:     (notified)            84 yo woman with history of hypertension and dyslipidemia comes in from home for abdominal pain found to have ischemia small bowel. 8/6/23 s/p diagnostic laparoscopy with laparotomy with HAROON for SBO. CT abd: Findings compatible with closed loop small bowel obstruction and ischemic loop of small bowel. Patient tolerating diet.       Wilmer [phone] (daughter aware of plan)          Code Status:     Discharging Provider:  Osmel Bryant NP  Contact Info: [phone]. Please call with any questions or concerns.    Outpatient Provider:  Dr. Godwin   (notified)          HPI  82 yo woman with history of hypertension and dyslipidemia comes in from home for abdominal pain found to have ischemic small bowel. CTAP showed closed loop small bowel obstruction and ischemic loop of small bowel. She underwent diagnostic laparoscopy with laparotomy with HAROON for SBO on 8/6 with Dr Chisholm. Diet advanced, now tolerating regular diet. While admitted her HCTZ and lisinopril were held, will resume upon DC. She was accepted for acute inpatient rehab, stable for discharge to acute rehab.     Discharging Provider:  Poonam No NP  Contact Info: [phone]. Please call with any questions or concerns.    Outpatient Provider:  Dr. Godwin  (notified)

## 2023-08-08 NOTE — DISCHARGE NOTE PROVIDER - NSDCCPCAREPLAN_GEN_ALL_CORE_FT
PRINCIPAL DISCHARGE DIAGNOSIS  Diagnosis: Small bowel obstruction  Assessment and Plan of Treatment: resolved s/p surgery 8/6/23.  Go to acute rehab to get stronger. Follow up with your primary care provider and surgical team.

## 2023-08-08 NOTE — DISCHARGE NOTE PROVIDER - NSDCMRMEDTOKEN_GEN_ALL_CORE_FT
amLODIPine 5 mg oral tablet: orally 2 times a day  calcitonin 200 intl units/inh nasal spray: 1 intranasally once a day  hydroCHLOROthiazide 12.5 mg oral capsule: 1 cap(s) orally once a day  lisinopril 40 mg oral tablet: 1 tab(s) orally once a day  Zetia 10 mg oral tablet: 1 orally once a day

## 2023-08-09 PROCEDURE — 99232 SBSQ HOSP IP/OBS MODERATE 35: CPT

## 2023-08-09 RX ORDER — POTASSIUM CHLORIDE 20 MEQ
40 PACKET (EA) ORAL ONCE
Refills: 0 | Status: COMPLETED | OUTPATIENT
Start: 2023-08-09 | End: 2023-08-09

## 2023-08-09 RX ADMIN — AMLODIPINE BESYLATE 5 MILLIGRAM(S): 2.5 TABLET ORAL at 17:52

## 2023-08-09 RX ADMIN — Medication 40 MILLIEQUIVALENT(S): at 10:33

## 2023-08-09 RX ADMIN — TRAMADOL HYDROCHLORIDE 50 MILLIGRAM(S): 50 TABLET ORAL at 18:15

## 2023-08-09 RX ADMIN — TRAMADOL HYDROCHLORIDE 50 MILLIGRAM(S): 50 TABLET ORAL at 18:59

## 2023-08-09 RX ADMIN — ENOXAPARIN SODIUM 40 MILLIGRAM(S): 100 INJECTION SUBCUTANEOUS at 14:35

## 2023-08-09 NOTE — PROGRESS NOTE ADULT - SUBJECTIVE AND OBJECTIVE BOX
Patient is a 83y old  Female who presents with a chief complaint of Abdominal Pain (08 Aug 2023 12:20)      Patient seen and examined at bedside. No overnight events reported. Patient states she has slight abdominal pain. Denies sob, chest pain, nausea, vomiting.     ALLERGIES:  No Known Allergies    MEDICATIONS  (STANDING):  amLODIPine   Tablet 5 milliGRAM(s) Oral <User Schedule>  enoxaparin Injectable 40 milliGRAM(s) SubCutaneous every 24 hours    MEDICATIONS  (PRN):  HYDROmorphone  Injectable 1 milliGRAM(s) IV Push every 4 hours PRN Severe Pain (7 - 10)  ondansetron Injectable 4 milliGRAM(s) IV Push every 6 hours PRN Nausea  traMADol 50 milliGRAM(s) Oral every 4 hours PRN Moderate Pain (4 - 6)    Vital Signs Last 24 Hrs  T(F): 98.1 (09 Aug 2023 04:33), Max: 98.1 (08 Aug 2023 11:54)  HR: 66 (09 Aug 2023 04:33) (66 - 86)  BP: 111/62 (09 Aug 2023 04:33) (111/62 - 128/57)  RR: 12 (09 Aug 2023 04:33) (12 - 22)  SpO2: 96% (09 Aug 2023 04:33) (95% - 97%)  I&O's Summary    08 Aug 2023 07:01  -  09 Aug 2023 07:00  --------------------------------------------------------  IN: 0 mL / OUT: 1800 mL / NET: -1800 mL      PHYSICAL EXAM:  General: NAD, A/O x 3  ENT: No gross hearing impairment, Moist mucous membranes, no thrush  Neck: Supple, No JVD  Lungs: Clear to auscultation bilaterally, good air entry, non-labored breathing  Cardio: RRR, S1/S2, No murmur  Abdomen: Soft, tender, surgical sites intact with minimal erythema, ; Bowel sounds present  Extremities: No calf tenderness, No cyanosis, No pitting edema  Psych: Appropriate mood and affect    LABS:                        13.2   11.43 )-----------( 249      ( 08 Aug 2023 06:20 )             37.7     08-08    135  |  100  |  13  ----------------------------<  89  3.3   |  29  |  0.66    Ca    8.6      08 Aug 2023 06:20  Phos  3.6     08-07  Mg     1.9     08-07    TPro  5.6  /  Alb  2.6  /  TBili  0.9  /  DBili  x   /  AST  24  /  ALT  14  /  AlkPhos  79  08-07      Lipase: 98 U/L (08-06-23 @ 12:00)      PT/INR - ( 06 Aug 2023 11:15 )   PT: 11.0 sec;   INR: 0.96 ratio         PTT - ( 06 Aug 2023 11:15 )  PTT:25.6 sec  Lactate, Blood: 0.9 mmol/L (08-06 @ 21:16)  Lactate, Blood: 2.3 mmol/L (08-06 @ 11:15)      CARDIAC MARKERS ( 06 Aug 2023 12:00 )  x     / 6.4 ng/L / x     / x     / x          08-07 Chol 176 mg/dL LDL -- HDL 99 mg/dL Trig 40 mg/dL                  Urinalysis Basic - ( 08 Aug 2023 06:20 )    Color: x / Appearance: x / SG: x / pH: x  Gluc: 89 mg/dL / Ketone: x  / Bili: x / Urobili: x   Blood: x / Protein: x / Nitrite: x   Leuk Esterase: x / RBC: x / WBC x   Sq Epi: x / Non Sq Epi: x / Bacteria: x          RADIOLOGY & ADDITIONAL TESTS:    Care Discussed with Consultants/Other Providers:    Patient is a 83y old  Female who presents with a chief complaint of Abdominal Pain (08 Aug 2023 12:20)    Patient seen and examined at bedside. No overnight events reported. Patient states she has slight abdominal pain. Denies sob, chest pain, nausea, vomiting.     ALLERGIES:  No Known Allergies    MEDICATIONS  (STANDING):  amLODIPine   Tablet 5 milliGRAM(s) Oral <User Schedule>  enoxaparin Injectable 40 milliGRAM(s) SubCutaneous every 24 hours    MEDICATIONS  (PRN):  HYDROmorphone  Injectable 1 milliGRAM(s) IV Push every 4 hours PRN Severe Pain (7 - 10)  ondansetron Injectable 4 milliGRAM(s) IV Push every 6 hours PRN Nausea  traMADol 50 milliGRAM(s) Oral every 4 hours PRN Moderate Pain (4 - 6)    Vital Signs Last 24 Hrs  T(F): 98.1 (09 Aug 2023 04:33), Max: 98.1 (08 Aug 2023 11:54)  HR: 66 (09 Aug 2023 04:33) (66 - 86)  BP: 111/62 (09 Aug 2023 04:33) (111/62 - 128/57)  RR: 12 (09 Aug 2023 04:33) (12 - 22)  SpO2: 96% (09 Aug 2023 04:33) (95% - 97%)  I&O's Summary    08 Aug 2023 07:01  -  09 Aug 2023 07:00  --------------------------------------------------------  IN: 0 mL / OUT: 1800 mL / NET: -1800 mL      PHYSICAL EXAM:  General: NAD, A/O x 3  ENT: No gross hearing impairment, Moist mucous membranes, no thrush  Neck: Supple, No JVD  Lungs: Clear to auscultation bilaterally, good air entry, non-labored breathing  Cardio: RRR, S1/S2, No murmur  Abdomen: Soft, tender, surgical sites intact with minimal erythema, ; Bowel sounds present  Extremities: No calf tenderness, No cyanosis, No pitting edema  Psych: Appropriate mood and affect    LABS:                        13.2   11.43 )-----------( 249      ( 08 Aug 2023 06:20 )             37.7     08-08    135  |  100  |  13  ----------------------------<  89  3.3   |  29  |  0.66    Ca    8.6      08 Aug 2023 06:20  Phos  3.6     08-07  Mg     1.9     08-07    TPro  5.6  /  Alb  2.6  /  TBili  0.9  /  DBili  x   /  AST  24  /  ALT  14  /  AlkPhos  79  08-07      Lipase: 98 U/L (08-06-23 @ 12:00)      PT/INR - ( 06 Aug 2023 11:15 )   PT: 11.0 sec;   INR: 0.96 ratio         PTT - ( 06 Aug 2023 11:15 )  PTT:25.6 sec  Lactate, Blood: 0.9 mmol/L (08-06 @ 21:16)  Lactate, Blood: 2.3 mmol/L (08-06 @ 11:15)      CARDIAC MARKERS ( 06 Aug 2023 12:00 )  x     / 6.4 ng/L / x     / x     / x          08-07 Chol 176 mg/dL LDL -- HDL 99 mg/dL Trig 40 mg/dL                  Urinalysis Basic - ( 08 Aug 2023 06:20 )    Color: x / Appearance: x / SG: x / pH: x  Gluc: 89 mg/dL / Ketone: x  / Bili: x / Urobili: x   Blood: x / Protein: x / Nitrite: x   Leuk Esterase: x / RBC: x / WBC x   Sq Epi: x / Non Sq Epi: x / Bacteria: x          RADIOLOGY & ADDITIONAL TESTS:    Care Discussed with Consultants/Other Providers:

## 2023-08-09 NOTE — PROGRESS NOTE ADULT - ASSESSMENT
84 yo woman with history of hypertension and dyslipidemia comes in from home for abdominal pain found to have ischemia small bowel.    Intractable abdominal Pain due to Ischemic Bowel  POD#3 Diagnostic laparoscopy with laparotomy with HAROON for SBO  - CT abd: Findings compatible with closed loop small bowel obstruction and ischemic loop of small bowel.  - initial lactate 2.3, then .9, 1 dose Abx given in ED (zosyn)  - slowly advanced diet as per surgery, patient tolerating regular diet  - Pain meds PRN    Hypertension  - Home Meds: amlodipine 5mg PO BID, HCTZ 12.5mg PO daily, lisinopril 40mg PO daily  - Continue amlodipine 5mg BID, hold other BP meds, bp on lowside    Dyslipidemia  -lipid panel reviewed  -continue zetia 10mg daily    Hypokalemia  -Replete  -Monitor BMP    DVT Prophylaxis   -Lovenox      Wilmer [phone] (daughter aware of plan) 82 yo woman with history of hypertension and dyslipidemia comes in from home for abdominal pain found to have ischemia small bowel.    Intractable abdominal Pain due to Ischemic Bowel  POD#3 Diagnostic laparoscopy with laparotomy with HAROON for SBO  - CT abd: Findings compatible with closed loop small bowel obstruction and ischemic loop of small bowel.  - initial lactate 2.3, then .9, 1 dose Abx given in ED (zosyn)  - slowly advanced diet as per surgery, patient tolerating regular diet  - Pain meds PRN    Hypertension  - Home Meds: amlodipine 5mg PO BID, HCTZ 12.5mg PO daily, lisinopril 40mg PO daily  - Continue amlodipine 5mg BID, hold other BP meds, bp on lowside    Dyslipidemia  -lipid panel reviewed  -continue zetia 10mg daily    Hypokalemia  -Replete  -Monitor BMP    DVT Prophylaxis   -Lovenox      Jennifer [phone] (daughter aware of plan)

## 2023-08-09 NOTE — PROGRESS NOTE ADULT - SUBJECTIVE AND OBJECTIVE BOX
INTERVAL HPI/OVERNIGHT EVENTS:  Pt. seen and examined at bedside resting comfortably.  Denies fever/chills, chest pain, dyspnea, cough, dizziness.     Vital Signs Last 24 Hrs  T(C): 36.7 (09 Aug 2023 04:33), Max: 36.7 (08 Aug 2023 11:54)  T(F): 98.1 (09 Aug 2023 04:33), Max: 98.1 (08 Aug 2023 11:54)  HR: 66 (09 Aug 2023 04:33) (66 - 86)  BP: 111/62 (09 Aug 2023 04:33) (111/62 - 128/57)  BP(mean): --  RR: 12 (09 Aug 2023 04:33) (12 - 22)  SpO2: 96% (09 Aug 2023 04:33) (95% - 97%)    Parameters below as of 09 Aug 2023 04:33  Patient On (Oxygen Delivery Method): room air        PHYSICAL EXAM:    GENERAL: NAD  HEAD:  Atraumatic, Normocephalic  EYES: EOMI, PERRLA, conjunctiva and sclera clear  CHEST/LUNG: Clear to ausculation, bilaterally   HEART: S1S2  ABDOMEN: non distended, +BS, soft, non tender, no guarding  EXTREMITIES:  calf soft, non tender b/l. 2+ distal pulses b/l.     I&O's Detail    08 Aug 2023 07:01  -  09 Aug 2023 07:00  --------------------------------------------------------  IN:  Total IN: 0 mL    OUT:    Voided (mL): 1800 mL  Total OUT: 1800 mL    Total NET: -1800 mL      09 Aug 2023 07:01  -  09 Aug 2023 11:20  --------------------------------------------------------  IN:  Total IN: 0 mL    OUT:    Voided (mL): 500 mL  Total OUT: 500 mL    Total NET: -500 mL          LABS:                        13.2   11.43 )-----------( 249      ( 08 Aug 2023 06:20 )             37.7     08-08    135  |  100  |  13  ----------------------------<  89  3.3<L>   |  29  |  0.66    Ca    8.6      08 Aug 2023 06:20        Urinalysis Basic - ( 08 Aug 2023 06:20 )    Color: x / Appearance: x / SG: x / pH: x  Gluc: 89 mg/dL / Ketone: x  / Bili: x / Urobili: x   Blood: x / Protein: x / Nitrite: x   Leuk Esterase: x / RBC: x / WBC x   Sq Epi: x / Non Sq Epi: x / Bacteria: x

## 2023-08-09 NOTE — PROGRESS NOTE ADULT - ASSESSMENT
Impression: 83F s/p Diagnostic laparoscopy with laparotomy with HAROON for SBO     Plan: Patient tolerated diet with no complaints, currently passing flatus with no BM. Continue bowel Regimen Patient is okay to be discharged home verse rehab with post op appointment with Dr. Chisholm. Please reach out with any questions of concerns to the Surgical team.

## 2023-08-10 ENCOUNTER — INPATIENT (INPATIENT)
Facility: HOSPITAL | Age: 83
LOS: 11 days | Discharge: HOME HEALTH | DRG: 949 | End: 2023-08-22
Attending: INTERNAL MEDICINE | Admitting: INTERNAL MEDICINE
Payer: COMMERCIAL

## 2023-08-10 ENCOUNTER — TRANSCRIPTION ENCOUNTER (OUTPATIENT)
Age: 83
End: 2023-08-10

## 2023-08-10 VITALS
DIASTOLIC BLOOD PRESSURE: 76 MMHG | SYSTOLIC BLOOD PRESSURE: 165 MMHG | HEIGHT: 61 IN | HEART RATE: 85 BPM | TEMPERATURE: 98 F | RESPIRATION RATE: 16 BRPM | OXYGEN SATURATION: 96 % | WEIGHT: 138.89 LBS

## 2023-08-10 VITALS
RESPIRATION RATE: 15 BRPM | DIASTOLIC BLOOD PRESSURE: 68 MMHG | OXYGEN SATURATION: 96 % | SYSTOLIC BLOOD PRESSURE: 135 MMHG | HEART RATE: 84 BPM | TEMPERATURE: 98 F

## 2023-08-10 DIAGNOSIS — Z98.89 OTHER SPECIFIED POSTPROCEDURAL STATES: Chronic | ICD-10-CM

## 2023-08-10 DIAGNOSIS — R53.81 OTHER MALAISE: ICD-10-CM

## 2023-08-10 DIAGNOSIS — Z90.710 ACQUIRED ABSENCE OF BOTH CERVIX AND UTERUS: Chronic | ICD-10-CM

## 2023-08-10 LAB
ANION GAP SERPL CALC-SCNC: 7 MMOL/L — SIGNIFICANT CHANGE UP (ref 5–17)
BUN SERPL-MCNC: 18 MG/DL — SIGNIFICANT CHANGE UP (ref 7–23)
CALCIUM SERPL-MCNC: 8.7 MG/DL — SIGNIFICANT CHANGE UP (ref 8.4–10.5)
CHLORIDE SERPL-SCNC: 98 MMOL/L — SIGNIFICANT CHANGE UP (ref 96–108)
CO2 SERPL-SCNC: 28 MMOL/L — SIGNIFICANT CHANGE UP (ref 22–31)
CREAT SERPL-MCNC: 0.61 MG/DL — SIGNIFICANT CHANGE UP (ref 0.5–1.3)
EGFR: 88 ML/MIN/1.73M2 — SIGNIFICANT CHANGE UP
GLUCOSE SERPL-MCNC: 86 MG/DL — SIGNIFICANT CHANGE UP (ref 70–99)
HCT VFR BLD CALC: 37.3 % — SIGNIFICANT CHANGE UP (ref 34.5–45)
HGB BLD-MCNC: 12.7 G/DL — SIGNIFICANT CHANGE UP (ref 11.5–15.5)
MCHC RBC-ENTMCNC: 32 PG — SIGNIFICANT CHANGE UP (ref 27–34)
MCHC RBC-ENTMCNC: 34 GM/DL — SIGNIFICANT CHANGE UP (ref 32–36)
MCV RBC AUTO: 94 FL — SIGNIFICANT CHANGE UP (ref 80–100)
NRBC # BLD: 0 /100 WBCS — SIGNIFICANT CHANGE UP (ref 0–0)
PLATELET # BLD AUTO: 238 K/UL — SIGNIFICANT CHANGE UP (ref 150–400)
POTASSIUM SERPL-MCNC: 3.9 MMOL/L — SIGNIFICANT CHANGE UP (ref 3.5–5.3)
POTASSIUM SERPL-SCNC: 3.9 MMOL/L — SIGNIFICANT CHANGE UP (ref 3.5–5.3)
RBC # BLD: 3.97 M/UL — SIGNIFICANT CHANGE UP (ref 3.8–5.2)
RBC # FLD: 13.2 % — SIGNIFICANT CHANGE UP (ref 10.3–14.5)
SODIUM SERPL-SCNC: 133 MMOL/L — LOW (ref 135–145)
WBC # BLD: 9.26 K/UL — SIGNIFICANT CHANGE UP (ref 3.8–10.5)
WBC # FLD AUTO: 9.26 K/UL — SIGNIFICANT CHANGE UP (ref 3.8–10.5)

## 2023-08-10 PROCEDURE — 85610 PROTHROMBIN TIME: CPT

## 2023-08-10 PROCEDURE — 83605 ASSAY OF LACTIC ACID: CPT

## 2023-08-10 PROCEDURE — 96375 TX/PRO/DX INJ NEW DRUG ADDON: CPT

## 2023-08-10 PROCEDURE — 93005 ELECTROCARDIOGRAM TRACING: CPT

## 2023-08-10 PROCEDURE — 86850 RBC ANTIBODY SCREEN: CPT

## 2023-08-10 PROCEDURE — 84100 ASSAY OF PHOSPHORUS: CPT

## 2023-08-10 PROCEDURE — 36415 COLL VENOUS BLD VENIPUNCTURE: CPT

## 2023-08-10 PROCEDURE — 83690 ASSAY OF LIPASE: CPT

## 2023-08-10 PROCEDURE — 83735 ASSAY OF MAGNESIUM: CPT

## 2023-08-10 PROCEDURE — 80048 BASIC METABOLIC PNL TOTAL CA: CPT

## 2023-08-10 PROCEDURE — 86901 BLOOD TYPING SEROLOGIC RH(D): CPT

## 2023-08-10 PROCEDURE — 71045 X-RAY EXAM CHEST 1 VIEW: CPT

## 2023-08-10 PROCEDURE — 80053 COMPREHEN METABOLIC PANEL: CPT

## 2023-08-10 PROCEDURE — 85025 COMPLETE CBC W/AUTO DIFF WBC: CPT

## 2023-08-10 PROCEDURE — 97535 SELF CARE MNGMENT TRAINING: CPT

## 2023-08-10 PROCEDURE — 84484 ASSAY OF TROPONIN QUANT: CPT

## 2023-08-10 PROCEDURE — 0241U: CPT

## 2023-08-10 PROCEDURE — 97166 OT EVAL MOD COMPLEX 45 MIN: CPT

## 2023-08-10 PROCEDURE — 97162 PT EVAL MOD COMPLEX 30 MIN: CPT

## 2023-08-10 PROCEDURE — 99239 HOSP IP/OBS DSCHRG MGMT >30: CPT

## 2023-08-10 PROCEDURE — 85730 THROMBOPLASTIN TIME PARTIAL: CPT

## 2023-08-10 PROCEDURE — 80061 LIPID PANEL: CPT

## 2023-08-10 PROCEDURE — 85027 COMPLETE CBC AUTOMATED: CPT

## 2023-08-10 PROCEDURE — 96365 THER/PROPH/DIAG IV INF INIT: CPT

## 2023-08-10 PROCEDURE — 74177 CT ABD & PELVIS W/CONTRAST: CPT | Mod: MA

## 2023-08-10 PROCEDURE — 86900 BLOOD TYPING SEROLOGIC ABO: CPT

## 2023-08-10 PROCEDURE — 96367 TX/PROPH/DG ADDL SEQ IV INF: CPT

## 2023-08-10 PROCEDURE — 99285 EMERGENCY DEPT VISIT HI MDM: CPT | Mod: 25

## 2023-08-10 PROCEDURE — 87637 SARSCOV2&INF A&B&RSV AMP PRB: CPT

## 2023-08-10 RX ORDER — POLYETHYLENE GLYCOL 3350 17 G/17G
17 POWDER, FOR SOLUTION ORAL DAILY
Refills: 0 | Status: DISCONTINUED | OUTPATIENT
Start: 2023-08-10 | End: 2023-08-11

## 2023-08-10 RX ORDER — LANOLIN ALCOHOL/MO/W.PET/CERES
3 CREAM (GRAM) TOPICAL AT BEDTIME
Refills: 0 | Status: DISCONTINUED | OUTPATIENT
Start: 2023-08-10 | End: 2023-08-15

## 2023-08-10 RX ORDER — AMLODIPINE BESYLATE 2.5 MG/1
5 TABLET ORAL
Refills: 0 | Status: DISCONTINUED | OUTPATIENT
Start: 2023-08-10 | End: 2023-08-22

## 2023-08-10 RX ORDER — TRAMADOL HYDROCHLORIDE 50 MG/1
25 TABLET ORAL EVERY 6 HOURS
Refills: 0 | Status: DISCONTINUED | OUTPATIENT
Start: 2023-08-10 | End: 2023-08-14

## 2023-08-10 RX ORDER — POLYETHYLENE GLYCOL 3350 17 G/17G
17 POWDER, FOR SOLUTION ORAL DAILY
Refills: 0 | Status: DISCONTINUED | OUTPATIENT
Start: 2023-08-10 | End: 2023-08-10

## 2023-08-10 RX ORDER — ACETAMINOPHEN 500 MG
650 TABLET ORAL EVERY 6 HOURS
Refills: 0 | Status: DISCONTINUED | OUTPATIENT
Start: 2023-08-10 | End: 2023-08-22

## 2023-08-10 RX ORDER — TRAMADOL HYDROCHLORIDE 50 MG/1
50 TABLET ORAL EVERY 6 HOURS
Refills: 0 | Status: DISCONTINUED | OUTPATIENT
Start: 2023-08-10 | End: 2023-08-14

## 2023-08-10 RX ORDER — ENOXAPARIN SODIUM 100 MG/ML
40 INJECTION SUBCUTANEOUS EVERY 24 HOURS
Refills: 0 | Status: DISCONTINUED | OUTPATIENT
Start: 2023-08-10 | End: 2023-08-22

## 2023-08-10 RX ORDER — SENNA PLUS 8.6 MG/1
2 TABLET ORAL AT BEDTIME
Refills: 0 | Status: DISCONTINUED | OUTPATIENT
Start: 2023-08-10 | End: 2023-08-14

## 2023-08-10 RX ORDER — SENNA PLUS 8.6 MG/1
2 TABLET ORAL AT BEDTIME
Refills: 0 | Status: DISCONTINUED | OUTPATIENT
Start: 2023-08-10 | End: 2023-08-10

## 2023-08-10 RX ADMIN — TRAMADOL HYDROCHLORIDE 50 MILLIGRAM(S): 50 TABLET ORAL at 15:53

## 2023-08-10 RX ADMIN — Medication 10 MILLIGRAM(S): at 11:26

## 2023-08-10 RX ADMIN — AMLODIPINE BESYLATE 5 MILLIGRAM(S): 2.5 TABLET ORAL at 06:25

## 2023-08-10 RX ADMIN — TRAMADOL HYDROCHLORIDE 50 MILLIGRAM(S): 50 TABLET ORAL at 14:16

## 2023-08-10 RX ADMIN — TRAMADOL HYDROCHLORIDE 50 MILLIGRAM(S): 50 TABLET ORAL at 17:02

## 2023-08-10 RX ADMIN — AMLODIPINE BESYLATE 5 MILLIGRAM(S): 2.5 TABLET ORAL at 17:03

## 2023-08-10 RX ADMIN — ENOXAPARIN SODIUM 40 MILLIGRAM(S): 100 INJECTION SUBCUTANEOUS at 17:04

## 2023-08-10 RX ADMIN — TRAMADOL HYDROCHLORIDE 50 MILLIGRAM(S): 50 TABLET ORAL at 18:01

## 2023-08-10 RX ADMIN — SENNA PLUS 2 TABLET(S): 8.6 TABLET ORAL at 21:07

## 2023-08-10 NOTE — PROGRESS NOTE ADULT - ASSESSMENT
82 yo woman with history of hypertension and dyslipidemia comes in from home for abdominal pain found to have ischemia small bowel.    #Intractable abdominal Pain due to Ischemic Bowel  - s/p diagnostic laparoscopy with laparotomy with HAROON for SBO on 8/6 with Dr Chisholm - POD#4   - CT abd: Findings compatible with closed loop small bowel obstruction and ischemic loop of small bowel  - Slowly advanced diet as per surgery, patient tolerating regular diet  - Pain meds PRN    #Hypertension  - Home Meds: amlodipine 5mg PO BID, HCTZ 12.5mg PO daily, lisinopril 40mg PO daily  - Continue amlodipine 5mg BID, resume other BP meds as tolerated     #Dyslipidemia  - Lipid panel reviewed  - Continue zetia 10mg daily    #Hypokalemia  - Replete  - Monitor BMP    #DVT Prophylaxis   - Lovenox    Dispo: Medically cleared for discharge to acute rehab    daughter Jennifer [phone]  82 yo woman with history of hypertension and dyslipidemia comes in from home for abdominal pain found to have ischemia small bowel.    Ischemic bowel s/p laparatomy with lysis of adhesions on 8/6/23  - s/p diagnostic laparoscopy with laparotomy with HAROON for SBO on 8/6 with Dr Chisholm - POD#4   - CT abd: Findings compatible with closed loop small bowel obstruction and ischemic loop of small bowel  - Slowly advanced diet as per surgery, patient tolerating regular diet  - Pain meds PRN    #Hypertension  - Home Meds: amlodipine 5mg PO BID, HCTZ 12.5mg PO daily, lisinopril 40mg PO daily  - Continue amlodipine 5mg BID, resume other BP meds as tolerated     #Dyslipidemia  - Lipid panel reviewed  - Continue zetia 10mg daily    #Hypokalemia  - Replete  - Monitor BMP    #DVT Prophylaxis   - Lovenox    Dispo: Medically cleared for discharge to acute rehab    daughter Jennifer [phone]  82 yo woman with history of hypertension and dyslipidemia comes in from home for abdominal pain found to have ischemia small bowel.    Ischemic bowel s/p laparatomy with lysis of adhesions on 8/6/23  - s/p diagnostic laparoscopy with laparotomy with HAROON for SBO on 8/6 with Dr Chisholm - POD#4   - CT abd: Findings compatible with closed loop small bowel obstruction and ischemic loop of small bowel  - Slowly advanced diet as per surgery, patient tolerating regular diet  - Pain meds PRN    #Hypertension  - Home Meds: amlodipine 5mg PO BID, HCTZ 12.5mg PO daily, lisinopril 40mg PO daily  - Continue amlodipine 5mg BID, resume other BP meds as tolerated     #Dyslipidemia  - Lipid panel reviewed  - Continue zetia 10mg daily    #Hypokalemia  - Replete  - Monitor BMP    #DVT Prophylaxis   - Lovenox    Dispo: Medically cleared for discharge to acute rehab    8/10: Left voicemail for daughter Jennifer at [phone]  82 yo woman with history of hypertension and dyslipidemia comes in from home for abdominal pain found to have ischemia small bowel.    Ischemic bowel s/p laparatomy with lysis of adhesions on 8/6/23  - s/p diagnostic laparoscopy with laparotomy with HAROON for SBO on 8/6 with Dr Chisholm - POD#4   - CT abd: Findings compatible with closed loop small bowel obstruction and ischemic loop of small bowel  - Slowly advanced diet as per surgery, patient tolerating regular diet  - Pain meds PRN    #Hypertension  - Home Meds: amlodipine 5mg PO BID, HCTZ 12.5mg PO daily, lisinopril 40mg PO daily  - Continue amlodipine 5mg BID, resume other BP meds as tolerated     #Dyslipidemia  - Lipid panel reviewed  - Continue zetia 10mg daily    #Hypokalemia  - Replete  - Monitor BMP    #DVT Prophylaxis   - Lovenox    Dispo: Medically cleared for discharge to acute rehab    8/10: Updated daughter Jennifer at [phone]

## 2023-08-10 NOTE — DISCHARGE NOTE NURSING/CASE MANAGEMENT/SOCIAL WORK - PATIENT PORTAL LINK FT
You can access the FollowMyHealth Patient Portal offered by [facility] by registering at the following website: [email or web address]. By joining [employer]’s FollowMyHealth portal, you will also be able to view your health information using other applications (apps) compatible with our system.

## 2023-08-10 NOTE — PROGRESS NOTE ADULT - PROBLEM SELECTOR PLAN 1
OOB/PE  DVT/PE prophylaxis   will advance diet  dc IVF   replete potassium   can dc home versus Rehab   discussed with surgeon
continue reg diet  OOB/PT  dc as per medicine   recommend surgical follow up within 2 weeks.

## 2023-08-10 NOTE — H&P ADULT - NSHPREVIEWOFSYSTEMS_GEN_ALL_CORE
REVIEW OF SYSTEMS  Constitutional: No fever, No Chills, + fatigue  HEENT: No eye pain, No visual disturbances, No difficulty hearing  Pulm: No cough,  No shortness of breath  Cardio: No chest pain, No palpitations  GI:  + abdominal pain (LLQ), No nausea, No vomiting, No diarrhea, No constipation  : No dysuria, No frequency, No hematuria  Neuro: No headaches, No memory loss, + loss of strength, no numbness, No tremors  Skin: No itching, No rashes, No lesions   Endo: No temperature intolerance  MSK: + joint pain, No joint swelling, No muscle pain, No Neck pain,  No back pain  Psych:  No depression, No anxiety

## 2023-08-10 NOTE — PATIENT PROFILE ADULT - FUNCTIONAL ASSESSMENT - BASIC MOBILITY 6.
3-calculated by average/Not able to assess (calculate score using [facility] averaging method)

## 2023-08-10 NOTE — PROGRESS NOTE ADULT - SUBJECTIVE AND OBJECTIVE BOX
Patient is a 83y old  Female who presents with a chief complaint of Abdominal Pain (10 Aug 2023 08:53)    Patient seen and examined at bedside. No overnight events reported. Tolerating PO. She reports no BM yet     ALLERGIES:  No Known Allergies    MEDICATIONS  (STANDING):  amLODIPine   Tablet 5 milliGRAM(s) Oral <User Schedule>  enoxaparin Injectable 40 milliGRAM(s) SubCutaneous every 24 hours    MEDICATIONS  (PRN):  HYDROmorphone  Injectable 1 milliGRAM(s) IV Push every 4 hours PRN Severe Pain (7 - 10)  ondansetron Injectable 4 milliGRAM(s) IV Push every 6 hours PRN Nausea  traMADol 50 milliGRAM(s) Oral every 4 hours PRN Moderate Pain (4 - 6)    Vital Signs Last 24 Hrs  T(F): 97.9 (10 Aug 2023 08:30), Max: 99.9 (09 Aug 2023 12:23)  HR: 85 (10 Aug 2023 08:30) (85 - 95)  BP: 137/70 (10 Aug 2023 08:30) (132/86 - 156/78)  RR: 16 (10 Aug 2023 08:30) (13 - 21)  SpO2: 96% (10 Aug 2023 08:30) (94% - 98%)    I&O's Summary  09 Aug 2023 07:01  -  10 Aug 2023 07:00  --------------------------------------------------------  IN: 250 mL / OUT: 1650 mL / NET: -1400 mL    PHYSICAL EXAM:  General: NAD, A/O x 3  ENT: No gross hearing impairment, Moist mucous membranes, no thrush  Neck: Supple, No JVD  Lungs: Clear to auscultation bilaterally, good air entry, non-labored breathing  Cardio: RRR, S1/S2, No murmur  Abdomen: Soft, Nontender, Nondistended; Bowel sounds present. Midline surgical site clean, intact no discharge   Extremities: No calf tenderness, No cyanosis, No pitting edema  Psych: Appropriate mood and affect    LABS:                        12.7   9.26  )-----------( 238      ( 10 Aug 2023 06:39 )             37.3     08-10    133  |  98  |  18  ----------------------------<  86  3.9   |  28  |  0.61    Ca    8.7      10 Aug 2023 06:39        Lipase: 98 U/L (08-06-23 @ 12:00)      08-07 Chol 176 mg/dL LDL -- HDL 99 mg/dL Trig 40 mg/dL      Urinalysis Basic - ( 10 Aug 2023 06:39 )    Color: x / Appearance: x / SG: x / pH: x  Gluc: 86 mg/dL / Ketone: x  / Bili: x / Urobili: x   Blood: x / Protein: x / Nitrite: x   Leuk Esterase: x / RBC: x / WBC x   Sq Epi: x / Non Sq Epi: x / Bacteria: x          RADIOLOGY & ADDITIONAL TESTS:    Care Discussed with Consultants/Other Providers:    Patient is a 83y old  Female who presents with a chief complaint of Abdominal Pain (10 Aug 2023 08:53)    Patient seen and examined at bedside. No overnight events reported. Tolerating PO.      ALLERGIES:  No Known Allergies    MEDICATIONS  (STANDING):  amLODIPine   Tablet 5 milliGRAM(s) Oral <User Schedule>  enoxaparin Injectable 40 milliGRAM(s) SubCutaneous every 24 hours    MEDICATIONS  (PRN):  HYDROmorphone  Injectable 1 milliGRAM(s) IV Push every 4 hours PRN Severe Pain (7 - 10)  ondansetron Injectable 4 milliGRAM(s) IV Push every 6 hours PRN Nausea  traMADol 50 milliGRAM(s) Oral every 4 hours PRN Moderate Pain (4 - 6)    Vital Signs Last 24 Hrs  T(F): 97.9 (10 Aug 2023 08:30), Max: 99.9 (09 Aug 2023 12:23)  HR: 85 (10 Aug 2023 08:30) (85 - 95)  BP: 137/70 (10 Aug 2023 08:30) (132/86 - 156/78)  RR: 16 (10 Aug 2023 08:30) (13 - 21)  SpO2: 96% (10 Aug 2023 08:30) (94% - 98%)    I&O's Summary  09 Aug 2023 07:01  -  10 Aug 2023 07:00  --------------------------------------------------------  IN: 250 mL / OUT: 1650 mL / NET: -1400 mL    PHYSICAL EXAM:  General: NAD, A/O x 3  ENT: No gross hearing impairment, Moist mucous membranes, no thrush  Neck: Supple, No JVD  Lungs: Clear to auscultation bilaterally, good air entry, non-labored breathing  Cardio: RRR, S1/S2, No murmur  Abdomen: Soft, Nontender, Nondistended; Bowel sounds present. Midline surgical site clean, intact no discharge   Extremities: No calf tenderness, No cyanosis, No pitting edema  Psych: Appropriate mood and affect    LABS:                        12.7   9.26  )-----------( 238      ( 10 Aug 2023 06:39 )             37.3     08-10    133  |  98  |  18  ----------------------------<  86  3.9   |  28  |  0.61    Ca    8.7      10 Aug 2023 06:39        Lipase: 98 U/L (08-06-23 @ 12:00)      08-07 Chol 176 mg/dL LDL -- HDL 99 mg/dL Trig 40 mg/dL      Urinalysis Basic - ( 10 Aug 2023 06:39 )    Color: x / Appearance: x / SG: x / pH: x  Gluc: 86 mg/dL / Ketone: x  / Bili: x / Urobili: x   Blood: x / Protein: x / Nitrite: x   Leuk Esterase: x / RBC: x / WBC x   Sq Epi: x / Non Sq Epi: x / Bacteria: x          RADIOLOGY & ADDITIONAL TESTS:    Care Discussed with Consultants/Other Providers:

## 2023-08-10 NOTE — H&P ADULT - HISTORY OF PRESENT ILLNESS
82 yo woman with PMH of HTN and HLD presented to [facility] on 8/6 for abdominal pain. Patient found to have ischemic small bowel. CTAP showed closed loop small bowel obstruction and ischemic loop of small bowel. She underwent diagnostic laparoscopy with laparotomy with HAROON for SBO on 8/6 with Dr Chisholm. Diet advanced, now tolerating regular diet. While admitted her HCTZ and lisinopril were held, resumed on discharge. Patient evaluated by PT/OT/PM&R and recommended for acute inpatient rehab. Patient is medically stable for discharge to [facility] acute rehab on 8/10. 84 yo woman with PMH of HTN and HLD presented to [facility] on 8/6 for abdominal pain. Patient found to have ischemic small bowel. CTAP showed closed loop small bowel obstruction and ischemic loop of small bowel. She underwent diagnostic laparoscopy with laparotomy with Lysis of Adhesion for SBO on 8/6 with Dr Chisholm. Diet advanced, now tolerating regular diet. While admitted her HCTZ and lisinopril were held, resumed on discharge. Patient evaluated by PT/OT/PM&R and recommended for acute inpatient rehab. Patient is medically stable for discharge to [facility] acute rehab on 8/10.

## 2023-08-10 NOTE — PATIENT PROFILE ADULT - FALL HARM RISK - HARM RISK INTERVENTIONS
Assistance with ambulation/Assistance OOB with selected safe patient handling equipment/Communicate Risk of Fall with Harm to all staff/Discuss with provider need for PT consult/Monitor gait and stability/Provide patient with walking aids - walker, cane, crutches/Reinforce activity limits and safety measures with patient and family/Sit up slowly, dangle for a short time, stand at bedside before walking/Tailored Fall Risk Interventions/Use of alarms - bed, chair and/or voice tab/Visual Cue: Yellow wristband and red socks/Bed in lowest position, wheels locked, appropriate side rails in place/Call bell, personal items and telephone in reach/Instruct patient to call for assistance before getting out of bed or chair/Non-slip footwear when patient is out of bed/[place] to call system/Physically safe environment - no spills, clutter or unnecessary equipment/Purposeful Proactive Rounding/Room/bathroom lighting operational, light cord in reach

## 2023-08-10 NOTE — H&P ADULT - ATTENDING COMMENTS
Seen and examined      82 yo woman, Hx; HTN and HLD  Acute care presentation  [facility] on 8/6 for abdominal pain.   Dxed with closed loop small bowel obstruction and ischemic loop of small bowel., Now s/p t diagnostic laparoscopy with laparotomy with Lysis of adhesion for SBO on 8/6 with Dr Chisholm.. Acute rehab admission 8/10    Living alone, independent with ADLs, no gait device use  Hx of chronic back, knee and left foot pain  On injection treatments to back from her pain mgt physician    Ambulating minimal distance    Alert and fully oriented  MMT --antigravity all extremities  TTP left navicular at N point  TTP Rt anserine bursa    Midline abd surgical scar     RECENT LABS/IMAGING                        13.2   7.70  )-----------( 231      ( 11 Aug 2023 06:00 )             38.1     08-11    135  |  99  |  16  ----------------------------<  84  4.0   |  29  |  0.63    Ca    8.9      11 Aug 2023 06:00    TPro  6.3  /  Alb  2.6<L>  /  TBili  0.9  /  DBili  x   /  AST  23  /  ALT  18  /  AlkPhos  83  08-11      Urinalysis Basic - ( 11 Aug 2023 06:00 )    Color: x / Appearance: x / SG: x / pH: x  Gluc: 84 mg/dL / Ketone: x  / Bili: x / Urobili: x   Blood: x / Protein: x / Nitrite: x   Leuk Esterase: x / RBC: x / WBC x   Sq Epi: x / Non Sq Epi: x / Bacteria: x      Dx Debility due to diagnostic laparoscopy/laparotomy with Lysis of adhesion for SBO on 8/6   Commence therapy  DVT ppx lovenox  Continue current analgesic meds   Lidocaine patch to both knees and lower back  Continue bowel regimen  Collateral hx from daughter in law, staff of [facility]  Liaison with her pain mgt physician  Ext dc x 2 wks

## 2023-08-10 NOTE — H&P ADULT - NSHPPHYSICALEXAM_GEN_ALL_CORE
PHYSICAL EXAM  VITALS  T(C): 36.8 (08-10-23 @ 12:28), Max: 37.1 (08-09-23 @ 20:25)  HR: 84 (08-10-23 @ 12:28) (84 - 91)  BP: 135/68 (08-10-23 @ 12:28) (132/86 - 156/78)  RR: 15 (08-10-23 @ 12:28) (13 - 20)  SpO2: 96% (08-10-23 @ 12:28) (94% - 96%)    Gen - NAD, Comfortable  HEENT - NCAT, EOMI, MMM  Neck - Supple, No limited ROM  Pulm - CTAB, No wheeze, No rhonchi, No crackles  Cardiovascular - RRR, S1S2  Chest - good chest expansion, good respiratory effort  Abdomen - Soft, NT/ND, +BS  Extremities - No Cyanosis, no clubbing, no edema, no calf tenderness  Neuro-     Cognitive - awake, alert, oriented to person, place, date, year, and situation     Communication - Fluent, Comprehensible     Attention: Intact     Judgement: Good evidence of judgement     Memory: Recall 3 objects immediate and 3 min later     Cranial Nerves - CN 2-12 intact.     Motor - No focal deficits                    LEFT    UE - 5/5                    RIGHT UE -  5/5                    LEFT    LE - 4+/5                    RIGHT LE - 4+/5        Sensory - Intact  to LT      Reflexes - DTR Intact, No primitive reflexive     Coordination - FTN intact      Tone - normal  MSK: achy/soreness /atrophy to .....  Psychiatric - Mood stable, Affect WNL  Skin:   lap site x 3 to left side of abdomen and umbilical incision with surgiglue PHYSICAL EXAM  VITALS  T(C): 36.8 (08-10-23 @ 12:28), Max: 37.1 (08-09-23 @ 20:25)  HR: 84 (08-10-23 @ 12:28) (84 - 91)  BP: 135/68 (08-10-23 @ 12:28) (132/86 - 156/78)  RR: 15 (08-10-23 @ 12:28) (13 - 20)  SpO2: 96% (08-10-23 @ 12:28) (94% - 96%)    Gen - NAD, Comfortable  HEENT - NCAT, EOMI, MMM  Neck - Supple, No limited ROM  Pulm - CTAB, No wheeze, No rhonchi, No crackles  Cardiovascular - RRR, S1S2  Chest - good chest expansion, good respiratory effort  Abdomen - Soft, NT/ND, +BS  Extremities - No Cyanosis, no clubbing, no edema, no calf tenderness  Neuro-     Cognitive - awake, alert, oriented to person, place, date, year, and situation     Communication - Fluent, Comprehensible     Attention: Intact     Judgement: Good evidence of judgement     Memory: Recall 3 objects immediate and 3 min later     Cranial Nerves - CN 2-12 intact.     Motor -                     LEFT    UE - 4+/5                    RIGHT UE -  4+/5                    LEFT    LE - 4-/5                    RIGHT LE - 4-/5        Sensory - Intact  to LT      Reflexes - DTR Intact, No primitive reflexive     Coordination - FTN intact      Tone - normal  Psychiatric - Mood stable, Affect WNL  Skin:   lap site x 3 to left side of abdomen and umbilical incision with surgiglue

## 2023-08-10 NOTE — H&P ADULT - NSHPLABSRESULTS_GEN_ALL_CORE
RECENT LABS/IMAGING                        12.7   9.26  )-----------( 238      ( 10 Aug 2023 06:39 )             37.3     08-10    133<L>  |  98  |  18  ----------------------------<  86  3.9   |  28  |  0.61    Ca    8.7      10 Aug 2023 06:39    < from: Xray Chest 1 View-PORTABLE IMMEDIATE (Xray Chest 1 View-PORTABLE IMMEDIATE .) (08.06.23 @ 15:34) >    IMPRESSION:  1. The nasogastric tube requires further advancement, with the proximal   port residing at the level of the mid thoracic esophagus and the distal   tip near the esophagogastric junction.  2. Subsegmental atelectasis in the left lower lobe.  3. No free air seen beneath the diaphragm.  4. Lower thoracic levoscoliosis with degenerative changes throughout the   spine.    < end of copied text >    < from: CT Abdomen and Pelvis w/ IV Cont (08.06.23 @ 13:37) >    IMPRESSION:  Findings compatible with closed loop small bowel obstruction and ischemic   loop of small bowel. Urgent surgical consultation is recommended.    Critical findings were discussed with Dr. Church on 8/6/23 at 2:08 p.m with   read back.    < end of copied text > RECENT LABS/IMAGING                        12.7   9.26  )-----------( 238      ( 10 Aug 2023 06:39 )             37.3     08-10    133<L>  |  98  |  18  ----------------------------<  86  3.9   |  28  |  0.61    Ca    8.7      10 Aug 2023 06:39     X-ray Chest 1 View-PORTABLE IMMEDIATE (Xray Chest 1 View-PORTABLE IMMEDIATE .) (08.06.23 @ 15:34)     IMPRESSION:  1. The nasogastric tube requires further advancement, with the proximal   port residing at the level of the mid thoracic esophagus and the distal   tip near the esophagogastric junction.  2. Subsegmental atelectasis in the left lower lobe.  3. No free air seen beneath the diaphragm.  4. Lower thoracic levoscoliosis with degenerative changes throughout the   spine.    CT Abdomen and Pelvis w/ IV Cont (08.06.23 @ 13:37)     IMPRESSION:  Findings compatible with closed loop small bowel obstruction and ischemic   loop of small bowel. Urgent surgical consultation is recommended.    Critical findings were discussed with Dr. Church on 8/6/23 at 2:08 p.m with   read back.

## 2023-08-10 NOTE — PROGRESS NOTE ADULT - SUBJECTIVE AND OBJECTIVE BOX
83 yof pmh htn, presented to the ER with abdominal pain, w/u and underwent Dx lap/HAROON .   Patient is surgically stable this am POD # 4, pt comfortable in bed tolerating diet, voiding, flatus. denies chest pain, sob,n,v      PAST MEDICAL & SURGICAL HISTORY:  HTN (hypertension)      Osteoarthritis      Osteoporosis      S/P hernia repair  1993      S/P CESARIO (total abdominal hysterectomy)      S/P D&C (status post dilation and curettage)  x3      MEDICATIONS  (STANDING):  amLODIPine   Tablet 5 milliGRAM(s) Oral <User Schedule>  enoxaparin Injectable 40 milliGRAM(s) SubCutaneous every 24 hours    MEDICATIONS  (PRN):  HYDROmorphone  Injectable 1 milliGRAM(s) IV Push every 4 hours PRN Severe Pain (7 - 10)  ondansetron Injectable 4 milliGRAM(s) IV Push every 6 hours PRN Nausea  traMADol 50 milliGRAM(s) Oral every 4 hours PRN Moderate Pain (4 - 6)    Gen: Awake in NAD  abd: softly distended, tender on incision site, clean dry, non guarding   : voiding   LE: calfs soft, nt, no swelling bilat

## 2023-08-10 NOTE — H&P ADULT - NSHPSOCIALHISTORY_GEN_ALL_CORE
Smoking - Denied  EtOH - Denied   Drugs - Denied     Pt lives alone in private house, 1 [address] w/rail, 1st floor setup. pt uses cane to ambulate, has RW. pt independent w/most ADL's, does not drive.    CURRENT FUNCTIONAL STATUS  PT 8/10  Sit-stand: Min A  Gait: Min A 30ft x2 RW    OT 8/10  Toilet tx: Mod A  LBD: Mod A  Toilet training: Min A

## 2023-08-10 NOTE — DISCHARGE NOTE NURSING/CASE MANAGEMENT/SOCIAL WORK - NSDCPEFALRISK_GEN_ALL_CORE
For information on Fall & Injury Prevention, visit: [email or web address] OR  [email or web address] OR  https://www.cdc.gov/steadi/patient.html

## 2023-08-10 NOTE — H&P ADULT - ASSESSMENT
Assessment/Plan:  KD CONNELLY is a 83y with PMH of HTN and HLD presented to [facility] on 8/6 for abdominal pain, found to have ischemic small bowel and SBO, s/p laparotomy with HAROON on 8/6 with Dr. Chisholm. Patient now admitted for a multidisciplinary rehab program. 08-10-23 @ 13:01    Debility, SBO, small bowel ischemia  - s/p diagnostic laparoscopy with laparotomy with HAROON for SBO on 8/6 with Dr Chisholm  - Diet advanced, now tolerating regular diet  - Start comprehensive rehab program of PT/OT/SLP - 3 hours a day, 5 days a week. P&O as needed    HTN  - HCTZ and lisinopril were initially held, consider resuming if needed  - Continue norvasc 5mg BID  - Monitor BP    Pain  - Tylenol and tramadol PRN    Sleep  - Melatonin PRN     GI / Bowel  - Senna qHS  - Miralax PRN Daily     / Bladder  - Currently patient voids independently. Check PVRs on admission. SC for > 400ccs    Skin / Pressure injury  - Skin assessment on admission performed [  ] :   - Monitor Incisions:    - Turn q2 hours in bed while awake, air mattress  - nursing to monitor skin qShift  - soft heel protectors  - skin barrier cream PRN    Diet/Dysphagia:  - Diet Consistency: Regular    DVT prophylaxis:   - Lovenox      Outpatient Follow-up:  Sikp Godwin  Internal Medicine  [address] [place] [address]  Phone: [phone]  Fax: [phone]  Follow Up Time:     Simon Chisholm  Surgery  [address] [place] [address], [address]  [place] [address]  Phone: [phone]  Fax: [phone]  Follow Up Time:    ---------------    Goals: Safe discharge to home  Estimated Length of Stay: 10-14 days  Rehab Potential: Good  Medical Prognosis: Good  Estimated Disposition: Home with home care      PRESCREEN COMPARISON:  I have reviewed the prescreen information and I have found no relevant changes between the preadmission screening and my post admission evaluation.    RATIONALE FOR INPATIENT ADMISSION: Patient demonstrates the following:  [X] Medically appropriate for rehabilitation admission  [X] Has attainable rehab goals with an appropriate initial discharge plan  [X]Has rehabilitation potential (expected to make a significant improvement within a reasonable period of time)  [X] Requires close medical management by a rehab physician, rehab nursing care, Hospitalist and comprehensive interdisciplinary team (including PT, OT and/or SLP, Prosthetics and Orthotics) Assessment/Plan:  KD CONNELLY is a 83y with PMH of HTN and HLD presented to [facility] on 8/6 for abdominal pain, found to have ischemic small bowel and SBO, s/p laparotomy with HAROON on 8/6 with Dr. Chisholm. Patient now admitted for a multidisciplinary rehab program. 08-10-23 @ 13:01    Debility, SBO, small bowel ischemia  - s/p diagnostic laparoscopy with laparotomy with HAROON for SBO on 8/6 with Dr Chisholm  - Diet advanced, now tolerating regular diet  - Start comprehensive rehab program of PT/OT/SLP - 3 hours a day, 5 days a week. P&O as needed    HTN  - HCTZ and lisinopril were initially held, consider resuming if needed  - Continue norvasc 5mg BID  - Monitor BP    HLD  - Zetia 10mg qd (nonformulary, team to call in rx once patient arrives)    Pain  - Tylenol and tramadol PRN    Sleep  - Melatonin PRN     GI / Bowel  - Senna qHS  - Miralax PRN Daily     / Bladder  - Currently patient voids independently. Check PVRs on admission. SC for > 400ccs    Skin / Pressure injury  - Skin assessment on admission performed [  ] :   - Monitor Incisions:    - Turn q2 hours in bed while awake, air mattress  - nursing to monitor skin qShift  - soft heel protectors  - skin barrier cream PRN    Diet/Dysphagia:  - Diet Consistency: Regular    DVT prophylaxis:   - Lovenox      Outpatient Follow-up:  Skip Godwin  Internal Medicine  [address] [place] [address]  Phone: [phone]  Fax: [phone]  Follow Up Time:     Simon Chisholm  Surgery  [address] [place] [address], [address]  [place] [address]  Phone: [phone]  Fax: [phone]  Follow Up Time:    ---------------    Goals: Safe discharge to home  Estimated Length of Stay: 10-14 days  Rehab Potential: Good  Medical Prognosis: Good  Estimated Disposition: Home with home care      PRESCREEN COMPARISON:  I have reviewed the prescreen information and I have found no relevant changes between the preadmission screening and my post admission evaluation.    RATIONALE FOR INPATIENT ADMISSION: Patient demonstrates the following:  [X] Medically appropriate for rehabilitation admission  [X] Has attainable rehab goals with an appropriate initial discharge plan  [X]Has rehabilitation potential (expected to make a significant improvement within a reasonable period of time)  [X] Requires close medical management by a rehab physician, rehab nursing care, Hospitalist and comprehensive interdisciplinary team (including PT, OT and/or SLP, Prosthetics and Orthotics) Assessment/Plan:  KD CONNELLY is a 83y with PMH of HTN and HLD presented to [facility] on 8/6 for abdominal pain, found to have ischemic small bowel and SBO, s/p laparotomy with HAROON on 8/6 with Dr. Chisholm. Patient now admitted for a multidisciplinary rehab program. 08-10-23 @ 13:01    Debility, SBO, small bowel ischemia  - s/p diagnostic laparoscopy with laparotomy with HAROON for SBO on 8/6 with Dr Chisholm  - Diet advanced, now tolerating regular diet  - Start comprehensive rehab program of PT/OT/SLP - 3 hours a day, 5 days a week. P&O as needed    HTN  - HCTZ and lisinopril were initially held, consider resuming if needed  - Continue norvasc 5mg BID  - Monitor BP    HLD  - Zetia 10mg qd (nonformulary, team to call in rx once patient arrives)    Pain  - Tylenol and tramadol PRN    Sleep  - Melatonin PRN     GI / Bowel  - Senna qHS  - Miralax PRN Daily     / Bladder  - Currently patient voids independently. Check PVRs on admission. SC for > 400ccs    Skin / Pressure injury  - Skin assessment on admission performed : abdominal incision with surgiglue  - Turn q2 hours in bed while awake, air mattress  - nursing to monitor skin q Shift  - soft heel protectors  - skin barrier cream PRN    Diet/Dysphagia:  - Diet Consistency: Regular    DVT prophylaxis:   - Lovenox    Outpatient Follow-up:  Skip Godwin  Internal Medicine  [address] [place] [address]  Phone: [phone]  Fax: [phone]  Follow Up Time:     Simon Chisholm  Surgery  [address] [place] [address], [address]  [place] [address]  Phone: [phone]  Fax: [phone]  Follow Up Time:    ---------------    Goals: Safe discharge to home  Estimated Length of Stay: 10-14 days  Rehab Potential: Good  Medical Prognosis: Good  Estimated Disposition: Home with home care      PRESCREEN COMPARISON:  I have reviewed the prescreen information and I have found no relevant changes between the preadmission screening and my post admission evaluation.    RATIONALE FOR INPATIENT ADMISSION: Patient demonstrates the following:  [X] Medically appropriate for rehabilitation admission  [X] Has attainable rehab goals with an appropriate initial discharge plan  [X]Has rehabilitation potential (expected to make a significant improvement within a reasonable period of time)  [X] Requires close medical management by a rehab physician, rehab nursing care, Hospitalist and comprehensive interdisciplinary team (including PT, OT and/or SLP, Prosthetics and Orthotics) Assessment/Plan:  KD CONNELLY is a 83y with PMH of HTN and HLD presented to [facility] on 8/6 for abdominal pain, found to have ischemic small bowel and SBO, s/p laparotomy with HAROON on 8/6 with Dr. Chisholm. Patient now admitted for a multidisciplinary rehab program. 08-10-23 @ 13:01    Debility, SBO, small bowel ischemia  - s/p diagnostic laparoscopy with laparotomy with HAROON for SBO on 8/6 with Dr Chisholm  - Diet advanced, now tolerating regular diet  - Start comprehensive rehab program of PT/OT/SLP - 3 hours a day, 5 days a week. P&O as needed    HTN  - HCTZ and lisinopril were initially held, consider resuming if needed  - Continue norvasc 5mg BID  - Monitor BP    HLD  - Zetia 10mg qd (nonformulary, f/u with hospitalist regarding reinstating. Medication was discontinued on medical unit).    Pain  - Tylenol and tramadol PRN    Sleep  - Melatonin PRN     GI / Bowel  - Senna qHS  - Miralax PRN Daily     / Bladder  - Currently patient voids independently. Check PVRs on admission. SC for > 400ccs    Skin / Pressure injury  - Skin assessment on admission performed : abdominal incision with surgiglue  - Turn q2 hours in bed while awake, air mattress  - nursing to monitor skin q Shift  - soft heel protectors  - skin barrier cream PRN    Diet/Dysphagia:  - Diet Consistency: Regular    DVT prophylaxis:   - Lovenox    Outpatient Follow-up:  Skip Godwin  Internal Medicine  [address] [place] [address]  Phone: [phone]  Fax: [phone]  Follow Up Time:     Simon Chisholm  Surgery  [address] [facility] [place] [facility], [address]  [place] [address]  Phone: [phone]  Fax: [phone]  Follow Up Time:    ---------------  Goals: Safe discharge to home  Estimated Length of Stay: 10-14 days  Rehab Potential: Good  Medical Prognosis: Good  Estimated Disposition: Home with home care      PRESCREEN COMPARISON:  I have reviewed the prescreen information and I have found no relevant changes between the preadmission screening and my post admission evaluation.    RATIONALE FOR INPATIENT ADMISSION: Patient demonstrates the following:  [X] Medically appropriate for rehabilitation admission  [X] Has attainable rehab goals with an appropriate initial discharge plan  [X]Has rehabilitation potential (expected to make a significant improvement within a reasonable period of time)  [X] Requires close medical management by a rehab physician, rehab nursing care, Hospitalist and comprehensive interdisciplinary team (including PT, OT and/or SLP, Prosthetics and Orthotics) Assessment/Plan:  KD CONNELLY is a 83y with PMH of HTN and HLD presented to [facility] on 8/6 for abdominal pain, found to have ischemic small bowel and SBO, s/p laparotomy with HAROON on 8/6 with Dr. Chisholm. Patient now admitted for a multidisciplinary rehab program. 08-10-23 @ 13:01    * Monitor bowel movements  * Lidocaine patch to right knee, continue patch to left knee   * Podiatry consult for left navicular pain     Debility, SBO, small bowel ischemia  - s/p diagnostic laparoscopy with laparotomy with Lysis of adhesion  for SBO on 8/6 with Dr Chisholm  - Diet advanced, now tolerating regular diet  - Start comprehensive rehab program of PT/OT/SLP - 3 hours a day, 5 days a week. P&O as needed    HTN  - HCTZ and lisinopril were initially held, consider resuming if needed  - Continue norvasc 5mg BID    HLD  - Zetia 10mg qd (nonformulary, f/u with hospitalist regarding reinstating. Medication was discontinued on medical unit).    Pain  - Tylenol and tramadol PRN    Sleep  - Melatonin PRN     GI / Bowel  - Senna qHS  - Miralax PRN Daily     / Bladder  - Currently patient voids independently. Check PVRs on admission. SC for > 400ccs    Skin / Pressure injury  - Skin assessment on admission performed : abdominal incision with surgiglue  - Turn q2 hours in bed while awake, air mattress  - nursing to monitor skin q Shift  - soft heel protectors  - skin barrier cream PRN    Diet/Dysphagia:  - Diet Consistency: Regular    DVT prophylaxis:   - Lovenox    Outpatient Follow-up:  Skip Godwin  Internal Medicine  [address] [place] [address]  Phone: [phone]  Fax: [phone]  Follow Up Time:     Simon Chisholm  Surgery  [address] [facility], [address]  [place] [address]  Phone: [phone]  Fax: [phone]  Follow Up Time:    ---------------  Goals: Safe discharge to home  Estimated Length of Stay: 10-14 days  Rehab Potential: Good  Medical Prognosis: Good  Estimated Disposition: Home with home care      PRESCREEN COMPARISON:  I have reviewed the prescreen information and I have found no relevant changes between the preadmission screening and my post admission evaluation.    RATIONALE FOR INPATIENT ADMISSION: Patient demonstrates the following:  [X] Medically appropriate for rehabilitation admission  [X] Has attainable rehab goals with an appropriate initial discharge plan  [X]Has rehabilitation potential (expected to make a significant improvement within a reasonable period of time)  [X] Requires close medical management by a rehab physician, rehab nursing care, Hospitalist and comprehensive interdisciplinary team (including PT, OT and/or SLP, Prosthetics and Orthotics)

## 2023-08-10 NOTE — PROGRESS NOTE ADULT - NS ATTEND AMEND GEN_ALL_CORE FT
Medical stable for discharge to acute rehab
POD 1     PT lucia noted. Surgery following. Advancing diet to clears this am
Medically cleared for discharge to acute rehab

## 2023-08-11 LAB
ALBUMIN SERPL ELPH-MCNC: 2.6 G/DL — LOW (ref 3.3–5)
ALP SERPL-CCNC: 83 U/L — SIGNIFICANT CHANGE UP (ref 40–120)
ALT FLD-CCNC: 18 U/L — SIGNIFICANT CHANGE UP (ref 10–45)
ANION GAP SERPL CALC-SCNC: 7 MMOL/L — SIGNIFICANT CHANGE UP (ref 5–17)
AST SERPL-CCNC: 23 U/L — SIGNIFICANT CHANGE UP (ref 10–40)
BASOPHILS # BLD AUTO: 0.02 K/UL — SIGNIFICANT CHANGE UP (ref 0–0.2)
BASOPHILS NFR BLD AUTO: 0.3 % — SIGNIFICANT CHANGE UP (ref 0–2)
BILIRUB SERPL-MCNC: 0.9 MG/DL — SIGNIFICANT CHANGE UP (ref 0.2–1.2)
BUN SERPL-MCNC: 16 MG/DL — SIGNIFICANT CHANGE UP (ref 7–23)
CALCIUM SERPL-MCNC: 8.9 MG/DL — SIGNIFICANT CHANGE UP (ref 8.4–10.5)
CHLORIDE SERPL-SCNC: 99 MMOL/L — SIGNIFICANT CHANGE UP (ref 96–108)
CO2 SERPL-SCNC: 29 MMOL/L — SIGNIFICANT CHANGE UP (ref 22–31)
CREAT SERPL-MCNC: 0.63 MG/DL — SIGNIFICANT CHANGE UP (ref 0.5–1.3)
EGFR: 88 ML/MIN/1.73M2 — SIGNIFICANT CHANGE UP
EOSINOPHIL # BLD AUTO: 0.15 K/UL — SIGNIFICANT CHANGE UP (ref 0–0.5)
EOSINOPHIL NFR BLD AUTO: 1.9 % — SIGNIFICANT CHANGE UP (ref 0–6)
GLUCOSE SERPL-MCNC: 84 MG/DL — SIGNIFICANT CHANGE UP (ref 70–99)
HCT VFR BLD CALC: 38.1 % — SIGNIFICANT CHANGE UP (ref 34.5–45)
HGB BLD-MCNC: 13.2 G/DL — SIGNIFICANT CHANGE UP (ref 11.5–15.5)
IMM GRANULOCYTES NFR BLD AUTO: 1.2 % — HIGH (ref 0–0.9)
LYMPHOCYTES # BLD AUTO: 1.81 K/UL — SIGNIFICANT CHANGE UP (ref 1–3.3)
LYMPHOCYTES # BLD AUTO: 23.5 % — SIGNIFICANT CHANGE UP (ref 13–44)
MCHC RBC-ENTMCNC: 32.8 PG — SIGNIFICANT CHANGE UP (ref 27–34)
MCHC RBC-ENTMCNC: 34.6 GM/DL — SIGNIFICANT CHANGE UP (ref 32–36)
MCV RBC AUTO: 94.8 FL — SIGNIFICANT CHANGE UP (ref 80–100)
MONOCYTES # BLD AUTO: 0.69 K/UL — SIGNIFICANT CHANGE UP (ref 0–0.9)
MONOCYTES NFR BLD AUTO: 9 % — SIGNIFICANT CHANGE UP (ref 2–14)
NEUTROPHILS # BLD AUTO: 4.94 K/UL — SIGNIFICANT CHANGE UP (ref 1.8–7.4)
NEUTROPHILS NFR BLD AUTO: 64.1 % — SIGNIFICANT CHANGE UP (ref 43–77)
NRBC # BLD: 0 /100 WBCS — SIGNIFICANT CHANGE UP (ref 0–0)
PLATELET # BLD AUTO: 231 K/UL — SIGNIFICANT CHANGE UP (ref 150–400)
POTASSIUM SERPL-MCNC: 4 MMOL/L — SIGNIFICANT CHANGE UP (ref 3.5–5.3)
POTASSIUM SERPL-SCNC: 4 MMOL/L — SIGNIFICANT CHANGE UP (ref 3.5–5.3)
PROT SERPL-MCNC: 6.3 G/DL — SIGNIFICANT CHANGE UP (ref 6–8.3)
RBC # BLD: 4.02 M/UL — SIGNIFICANT CHANGE UP (ref 3.8–5.2)
RBC # FLD: 13.1 % — SIGNIFICANT CHANGE UP (ref 10.3–14.5)
SARS-COV-2 RNA SPEC QL NAA+PROBE: SIGNIFICANT CHANGE UP
SODIUM SERPL-SCNC: 135 MMOL/L — SIGNIFICANT CHANGE UP (ref 135–145)
WBC # BLD: 7.7 K/UL — SIGNIFICANT CHANGE UP (ref 3.8–10.5)
WBC # FLD AUTO: 7.7 K/UL — SIGNIFICANT CHANGE UP (ref 3.8–10.5)

## 2023-08-11 PROCEDURE — 99223 1ST HOSP IP/OBS HIGH 75: CPT

## 2023-08-11 PROCEDURE — 74018 RADEX ABDOMEN 1 VIEW: CPT | Mod: 26

## 2023-08-11 RX ORDER — LIDOCAINE 4 G/100G
2 CREAM TOPICAL
Refills: 0 | Status: DISCONTINUED | OUTPATIENT
Start: 2023-08-11 | End: 2023-08-22

## 2023-08-11 RX ORDER — LISINOPRIL 2.5 MG/1
20 TABLET ORAL DAILY
Refills: 0 | Status: DISCONTINUED | OUTPATIENT
Start: 2023-08-11 | End: 2023-08-15

## 2023-08-11 RX ORDER — SIMETHICONE 80 MG/1
80 TABLET, CHEWABLE ORAL
Refills: 0 | Status: DISCONTINUED | OUTPATIENT
Start: 2023-08-11 | End: 2023-08-22

## 2023-08-11 RX ORDER — ONDANSETRON 8 MG/1
4 TABLET, FILM COATED ORAL EVERY 6 HOURS
Refills: 0 | Status: DISCONTINUED | OUTPATIENT
Start: 2023-08-11 | End: 2023-08-22

## 2023-08-11 RX ORDER — POLYETHYLENE GLYCOL 3350 17 G/17G
17 POWDER, FOR SOLUTION ORAL
Refills: 0 | Status: DISCONTINUED | OUTPATIENT
Start: 2023-08-11 | End: 2023-08-14

## 2023-08-11 RX ADMIN — SIMETHICONE 80 MILLIGRAM(S): 80 TABLET, CHEWABLE ORAL at 23:30

## 2023-08-11 RX ADMIN — LIDOCAINE 2 PATCH: 4 CREAM TOPICAL at 08:30

## 2023-08-11 RX ADMIN — LIDOCAINE 2 PATCH: 4 CREAM TOPICAL at 18:43

## 2023-08-11 RX ADMIN — SENNA PLUS 2 TABLET(S): 8.6 TABLET ORAL at 22:48

## 2023-08-11 RX ADMIN — LIDOCAINE 2 PATCH: 4 CREAM TOPICAL at 20:00

## 2023-08-11 RX ADMIN — POLYETHYLENE GLYCOL 3350 17 GRAM(S): 17 POWDER, FOR SOLUTION ORAL at 17:50

## 2023-08-11 RX ADMIN — LISINOPRIL 20 MILLIGRAM(S): 2.5 TABLET ORAL at 08:10

## 2023-08-11 RX ADMIN — TRAMADOL HYDROCHLORIDE 50 MILLIGRAM(S): 50 TABLET ORAL at 09:00

## 2023-08-11 RX ADMIN — Medication 10 MILLIGRAM(S): at 22:05

## 2023-08-11 RX ADMIN — TRAMADOL HYDROCHLORIDE 50 MILLIGRAM(S): 50 TABLET ORAL at 08:10

## 2023-08-11 RX ADMIN — ENOXAPARIN SODIUM 40 MILLIGRAM(S): 100 INJECTION SUBCUTANEOUS at 18:23

## 2023-08-11 RX ADMIN — AMLODIPINE BESYLATE 5 MILLIGRAM(S): 2.5 TABLET ORAL at 18:23

## 2023-08-11 RX ADMIN — AMLODIPINE BESYLATE 5 MILLIGRAM(S): 2.5 TABLET ORAL at 05:45

## 2023-08-11 NOTE — CONSULT NOTE ADULT - ASSESSMENT
83F with HTN, HLD, recent surgery (HAROON) for SBO, now in acute rehab    #Debility  #Ischemic small bowel due to SBO s/p HAROON  -pain control  -PT/OT    #Essential HTN  -c/w Norvasc - increase to 10 mg...  (will hold on HCTZ / Lisinopril due to mild hyponatremia at this time, will increase Norvasc instead)    #HLD  -c/w Zetia as outpatient (unless patient brings on from home, then okay to resume)    #DVT ppx: Lovenox 83F with HTN, HLD, recent surgery (HAROON) for SBO, now in acute rehab    #Debility  #Ischemic small bowel due to SBO s/p HAROON  -pain control  -PT/OT    #Essential HTN  -c/w Norvasc  -resume Lisinopril but monitor sodium closely (start lower dose, 20 mg for now)  -if sodium downtrends in the future, would stop lisinopril and consider alternative BP medication    #HLD  -c/w Zetia as outpatient (unless patient brings on from home, then okay to resume)    #DVT ppx: Lovenox 83F with HTN, HLD, recent surgery (HAROON) for SBO, now in acute rehab    #Debility  #Ischemic small bowel due to SBO s/p HAROON  -pain control  -PT/OT    #Essential HTN  -c/w Norvasc  -resume Lisinopril but monitor sodium closely (start lower dose, 20 mg for now)  -if sodium downtrends in the future, would stop lisinopril and consider alternative BP medication  -would hold off on HCTZ for now, patient is clinically volume depleted and would encourage PO fluid intake    #HLD  -c/w Zetia as outpatient (unless patient brings on from home, then okay to resume)    #DVT ppx: Lovenox

## 2023-08-11 NOTE — DIETITIAN INITIAL EVALUATION ADULT - PERTINENT LABORATORY DATA
08-11    135  |  99  |  16  ----------------------------<  84  4.0   |  29  |  0.63    Ca    8.9      11 Aug 2023 06:00    TPro  6.3  /  Alb  2.6<L>  /  TBili  0.9  /  DBili  x   /  AST  23  /  ALT  18  /  AlkPhos  83  08-11

## 2023-08-11 NOTE — CONSULT NOTE ADULT - SUBJECTIVE AND OBJECTIVE BOX
Patient is a 83y old  Female who presents with a chief complaint of Debility (10 Aug 2023 12:49)    HPI:  82 yo woman with PMH of HTN and HLD presented to [facility] on 8/6 for abdominal pain. Patient found to have ischemic small bowel. CTAP showed closed loop small bowel obstruction and ischemic loop of small bowel. She underwent diagnostic laparoscopy with laparotomy with HAROON for SBO on 8/6 with Dr Chisholm. Diet advanced, now tolerating regular diet. While admitted her HCTZ and lisinopril were held, resumed on discharge. Patient evaluated by PT/OT/PM&R and recommended for acute inpatient rehab. Patient is medically stable for discharge to [facility] acute rehab on 8/10. (10 Aug 2023 12:49)      PAST MEDICAL & SURGICAL HISTORY:  HTN (hypertension)      Osteoarthritis      Osteoporosis      S/P hernia repair  1993      S/P CESARIO (total abdominal hysterectomy)      S/P D&C (status post dilation and curettage)  x3        SOCIAL HISTORY:  FAMILY HISTORY:    ALLERGIES:  No Known Allergies    MEDICATIONS  (STANDING):  amLODIPine   Tablet 5 milliGRAM(s) Oral <User Schedule>  enoxaparin Injectable 40 milliGRAM(s) SubCutaneous every 24 hours  polyethylene glycol 3350 17 Gram(s) Oral daily  senna 2 Tablet(s) Oral at bedtime    MEDICATIONS  (PRN):  acetaminophen     Tablet .. 650 milliGRAM(s) Oral every 6 hours PRN Mild Pain (1 - 3)  melatonin 3 milliGRAM(s) Oral at bedtime PRN Sleep  traMADol 25 milliGRAM(s) Oral every 6 hours PRN Moderate Pain (4 - 6)  traMADol 50 milliGRAM(s) Oral every 6 hours PRN Severe Pain (7 - 10)    Review of Systems: Refer to HPI for pertinent positives and negatives. All other ROS reviewed and negative except as otherwise stated above.    Vital Signs Last 24 Hrs  T(F): 97.6 (10 Aug 2023 21:01), Max: 98.2 (10 Aug 2023 12:28)  HR: 80 (11 Aug 2023 04:55) (78 - 85)  BP: 147/71 (11 Aug 2023 05:44) (134/84 - 165/76)  RR: 16 (10 Aug 2023 21:01) (15 - 16)  SpO2: 94% (10 Aug 2023 21:01) (94% - 96%)  I&O's Summary    10 Aug 2023 07:01  -  11 Aug 2023 07:00  --------------------------------------------------------  IN: 0 mL / OUT: 500 mL / NET: -500 mL      PHYSICAL EXAM:  GENERAL: NAD, well-groomed  HEAD:  Atraumatic, Normocephalic  EYES: EOMI, PERRL, conjunctiva and sclera clear  ENMT: Moist mucous membranes, Good dentition  NECK: Supple, No JVD  CHEST/LUNG: Clear to auscultation bilaterally, non-labored breathing, good air entry  HEART: RRR; S1/S2, No murmur  ABDOMEN: Soft, Nontender, Nondistended; Bowel sounds present  VASCULAR: Normal pulses, Normal capillary refill  EXTREMITIES: No cyanosis, No edema  LYMPH: No lymphadenopathy noted  SKIN: Warm, Intact  PSYCH: Normal mood and affect  NERVOUS SYSTEM:  A/O x3, Good concentration; CN 2-12 intact, No focal deficits, moves all extremities    LABS:                        12.7   9.26  )-----------( 238      ( 10 Aug 2023 06:39 )             37.3     08-10    133  |  98  |  18  ----------------------------<  86  3.9   |  28  |  0.61    Ca    8.7      10 Aug 2023 06:39                                    Urinalysis Basic - ( 10 Aug 2023 06:39 )    Color: x / Appearance: x / SG: x / pH: x  Gluc: 86 mg/dL / Ketone: x  / Bili: x / Urobili: x   Blood: x / Protein: x / Nitrite: x   Leuk Esterase: x / RBC: x / WBC x   Sq Epi: x / Non Sq Epi: x / Bacteria: x        COVID-19 PCR: NotDetec (08-11-23 @ 05:04)    OLD RECORDS REVIEWED: PRIOR D/C SUMMARY FROM PRIOR HOSPITAL COURSE    Case d/w Dr. Randall re: co-management plan of care Patient is a 83y old  Female who presents with a chief complaint of Debility (10 Aug 2023 12:49)    HPI:  82 yo woman with PMH of HTN and HLD presented to [facility] on  for abdominal pain. Patient found to have ischemic small bowel. CTAP showed closed loop small bowel obstruction and ischemic loop of small bowel. She underwent diagnostic laparoscopy with laparotomy with HAROON for SBO on  with Dr Chisholm. Diet advanced, now tolerating regular diet. While admitted her HCTZ and lisinopril were held, resumed on discharge. Patient evaluated by PT/OT/PM&R and recommended for acute inpatient rehab. Patient is medically stable for discharge to [facility] acute rehab on 8/10. (10 Aug 2023 12:49)    Currently, patient complains of abdominal pain only with movement, can be 7/10, improves with rest, but overall getting better, all in context of recent surgery. Tolerating food. + BM. No N/V. + generalized weakness    PAST MEDICAL & SURGICAL HISTORY:  HTN (hypertension)      Osteoarthritis      Osteoporosis      S/P hernia repair        S/P CESARIO (total abdominal hysterectomy)      S/P D&C (status post dilation and curettage)  x3        SOCIAL HISTORY: Never smoker  FAMILY HISTORY: Mother and father  of old age    ALLERGIES:  No Known Allergies    MEDICATIONS  (STANDING):  amLODIPine   Tablet 5 milliGRAM(s) Oral <User Schedule>  enoxaparin Injectable 40 milliGRAM(s) SubCutaneous every 24 hours  polyethylene glycol 3350 17 Gram(s) Oral daily  senna 2 Tablet(s) Oral at bedtime    MEDICATIONS  (PRN):  acetaminophen     Tablet .. 650 milliGRAM(s) Oral every 6 hours PRN Mild Pain (1 - 3)  melatonin 3 milliGRAM(s) Oral at bedtime PRN Sleep  traMADol 25 milliGRAM(s) Oral every 6 hours PRN Moderate Pain (4 - 6)  traMADol 50 milliGRAM(s) Oral every 6 hours PRN Severe Pain (7 - 10)    Review of Systems: Refer to HPI for pertinent positives and negatives. All other ROS reviewed and negative except as otherwise stated above.    Vital Signs Last 24 Hrs  T(F): 97.6 (10 Aug 2023 21:01), Max: 98.2 (10 Aug 2023 12:28)  HR: 80 (11 Aug 2023 04:55) (78 - 85)  BP: 147/71 (11 Aug 2023 05:44) (134/84 - 165/76)  RR: 16 (10 Aug 2023 21:01) (15 - 16)  SpO2: 94% (10 Aug 2023 21:01) (94% - 96%)  I&O's Summary    10 Aug 2023 07:01  -  11 Aug 2023 07:00  --------------------------------------------------------  IN: 0 mL / OUT: 500 mL / NET: -500 mL      PHYSICAL EXAM:  GENERAL: NAD, well-groomed  HEAD:  Atraumatic, Normocephalic  EYES: EOMI, PERRL, conjunctiva and sclera clear  ENMT: dry mucous membranes, Good dentition  NECK: Supple, No JVD  CHEST/LUNG: Clear to auscultation bilaterally, non-labored breathing, good air entry  HEART: RRR; S1/S2, No murmur  ABDOMEN: Soft, Nontender, Nondistended; Bowel sounds present  VASCULAR: Normal pulses, Normal capillary refill  EXTREMITIES: No cyanosis, No edema  LYMPH: No lymphadenopathy noted  SKIN: Warm, Intact, abdominal wounds/scars well-healing; +tenting  PSYCH: Normal mood and affect  NERVOUS SYSTEM:  A/O x3, Good concentration; CN 2-12 intact, No focal deficits, moves all extremities    LABS:                        12.7   9.26  )-----------( 238      ( 10 Aug 2023 06:39 )             37.3     08-10    133  |  98  |  18  ----------------------------<  86  3.9   |  28  |  0.61    Ca    8.7      10 Aug 2023 06:39      Urinalysis Basic - ( 10 Aug 2023 06:39 )    Color: x / Appearance: x / SG: x / pH: x  Gluc: 86 mg/dL / Ketone: x  / Bili: x / Urobili: x   Blood: x / Protein: x / Nitrite: x   Leuk Esterase: x / RBC: x / WBC x   Sq Epi: x / Non Sq Epi: x / Bacteria: x        COVID-19 PCR: NotDetec (23 @ 05:04)    OLD RECORDS REVIEWED: PRIOR D/C SUMMARY FROM PRIOR HOSPITAL COURSE    Case d/w Dr. Randall re: co-management plan of care

## 2023-08-11 NOTE — DIETITIAN INITIAL EVALUATION ADULT - PERSON TAUGHT/METHOD
Optimal protein-energy intake s/p SBO abdominal surgery/verbal instruction/teach back - (Patient repeats in own words)/patient instructed

## 2023-08-11 NOTE — DIETITIAN INITIAL EVALUATION ADULT - ADD RECOMMEND
1. Continue Regular  diet.   2. Recommend supplement Ensure Plus High Protein Daily 8 oz (Provides 350 kcal, 20 grams of protein) to assist pt in meeting estimated protein energy needs.  3. Monitor PO intake, GI tolerance, skin integrity, labs, weight, and bowel movement regularity.   4. Assist with meals PRN and encourage PO intake.  5. Provide ongoing diet education as needed  6. RD remains available upon request and will follow-up per protocol

## 2023-08-11 NOTE — DIETITIAN INITIAL EVALUATION ADULT - OTHER INFO
Reason for Admission: Debility  History of Present Illness:   82 yo woman with PMH of HTN and HLD presented to [facility] on 8/6 for abdominal pain. Patient found to have ischemic small bowel. CTAP showed closed loop small bowel obstruction and ischemic loop of small bowel. She underwent diagnostic laparoscopy with laparotomy with HAROON for SBO on 8/6 with Dr Chisholm. Diet advanced, now tolerating regular diet. While admitted her HCTZ and lisinopril were held, resumed on discharge. Patient evaluated by PT/OT/PM&R and recommended for acute inpatient rehab. Patient is medically stable for discharge to [facility] acute rehab on 8/10.    At this time patient tolerating current diet, w/ varied appetite/intake consuming % of meals. Recommend Ensure Plus High Protein Daily 8 oz (Provides 350 kcal, 20 grams of protein) QD to assist patient in meeting estimated energy requirements. No issues w/ dentition or chewing and swallowing current diet texture. Denies N/V/C/D, however w/ abdominal pain last BM 8/10 Per nursing flowsheets. UBW 143lb x 1 month .1lb. W/ observed moderate subcutaneous fat loss and muscle depletion.

## 2023-08-11 NOTE — DIETITIAN NUTRITION RISK NOTIFICATION - TREATMENT: THE FOLLOWING DIET HAS BEEN RECOMMENDED
Diet, Regular (08-10-23 @ 12:59) [Active]      Ensure Plus High Protein Daily 8 oz (Provides 350 kcal, 20 grams of protein)

## 2023-08-11 NOTE — DIETITIAN INITIAL EVALUATION ADULT - PERTINENT MEDS FT
MEDICATIONS  (STANDING):  amLODIPine   Tablet 5 milliGRAM(s) Oral <User Schedule>  enoxaparin Injectable 40 milliGRAM(s) SubCutaneous every 24 hours  lidocaine   4% Patch 2 Patch Transdermal <User Schedule>  lisinopril 20 milliGRAM(s) Oral daily  polyethylene glycol 3350 17 Gram(s) Oral two times a day  senna 2 Tablet(s) Oral at bedtime    MEDICATIONS  (PRN):  acetaminophen     Tablet .. 650 milliGRAM(s) Oral every 6 hours PRN Mild Pain (1 - 3)  bisacodyl Suppository 10 milliGRAM(s) Rectal daily PRN Constipation  melatonin 3 milliGRAM(s) Oral at bedtime PRN Sleep  traMADol 25 milliGRAM(s) Oral every 6 hours PRN Moderate Pain (4 - 6)  traMADol 50 milliGRAM(s) Oral every 6 hours PRN Severe Pain (7 - 10)

## 2023-08-11 NOTE — DIETITIAN INITIAL EVALUATION ADULT - ORAL INTAKE PTA/DIET HISTORY
PTA patient reports fair appetite intake. Reports Hx of diverticulosis avoids lactose, seeds and broccoli.  S/p laparotomy w/ HAROON foe SBO (8/6), w/ abdominal pain. NKFA nor food intolerances reported.

## 2023-08-12 PROCEDURE — 99232 SBSQ HOSP IP/OBS MODERATE 35: CPT

## 2023-08-12 RX ADMIN — TRAMADOL HYDROCHLORIDE 50 MILLIGRAM(S): 50 TABLET ORAL at 09:27

## 2023-08-12 RX ADMIN — LIDOCAINE 2 PATCH: 4 CREAM TOPICAL at 07:31

## 2023-08-12 RX ADMIN — ONDANSETRON 4 MILLIGRAM(S): 8 TABLET, FILM COATED ORAL at 01:43

## 2023-08-12 RX ADMIN — SIMETHICONE 80 MILLIGRAM(S): 80 TABLET, CHEWABLE ORAL at 23:13

## 2023-08-12 RX ADMIN — LIDOCAINE 2 PATCH: 4 CREAM TOPICAL at 19:39

## 2023-08-12 RX ADMIN — SIMETHICONE 80 MILLIGRAM(S): 80 TABLET, CHEWABLE ORAL at 05:11

## 2023-08-12 RX ADMIN — TRAMADOL HYDROCHLORIDE 50 MILLIGRAM(S): 50 TABLET ORAL at 10:27

## 2023-08-12 RX ADMIN — SIMETHICONE 80 MILLIGRAM(S): 80 TABLET, CHEWABLE ORAL at 18:05

## 2023-08-12 RX ADMIN — SIMETHICONE 80 MILLIGRAM(S): 80 TABLET, CHEWABLE ORAL at 11:17

## 2023-08-12 RX ADMIN — ENOXAPARIN SODIUM 40 MILLIGRAM(S): 100 INJECTION SUBCUTANEOUS at 18:05

## 2023-08-12 RX ADMIN — POLYETHYLENE GLYCOL 3350 17 GRAM(S): 17 POWDER, FOR SOLUTION ORAL at 18:05

## 2023-08-12 RX ADMIN — SENNA PLUS 2 TABLET(S): 8.6 TABLET ORAL at 21:38

## 2023-08-12 NOTE — PROGRESS NOTE ADULT - ASSESSMENT
83F with HTN, HLD, recent surgery (HAROON) for SBO, now in acute rehab    #Debility  #Ischemic small bowel due to SBO s/p HAROON  -pain control  -PT/OT    #Essential HTN  -c/w Norvasc  -resume Lisinopril but monitor sodium closely (start lower dose, 20 mg for now)  -if sodium downtrends in the future, would stop lisinopril and consider alternative BP medication  -would hold off on HCTZ for now, patient is clinically volume depleted and would encourage PO fluid intake    #HLD  -c/w Zetia as outpatient (unless patient brings on from home, then okay to resume)    #DVT ppx: Lovenox

## 2023-08-12 NOTE — PROGRESS NOTE ADULT - SUBJECTIVE AND OBJECTIVE BOX
reports a bad night, had BM       REVIEW OF SYSTEMS  Constitutional - No fever,  No fatigue  Neurological - No headaches, No loss of strength  Musculoskeletal - No joint pain, No joint swelling, No muscle pain    VITALS  T(C): 37 (08-12-23 @ 07:30), Max: 37 (08-12-23 @ 07:30)  HR: 86 (08-12-23 @ 07:30) (73 - 86)  BP: 118/63 (08-12-23 @ 07:30) (105/67 - 127/78)  RR: 16 (08-12-23 @ 07:30) (16 - 16)  SpO2: 95% (08-12-23 @ 07:30) (93% - 95%)  Wt(kg): --       MEDICATIONS   acetaminophen     Tablet .. 650 milliGRAM(s) every 6 hours PRN  amLODIPine   Tablet 5 milliGRAM(s) <User Schedule>  bisacodyl Suppository 10 milliGRAM(s) daily PRN  enoxaparin Injectable 40 milliGRAM(s) every 24 hours  lidocaine   4% Patch 2 Patch <User Schedule>  lisinopril 20 milliGRAM(s) daily  melatonin 3 milliGRAM(s) at bedtime PRN  ondansetron    Tablet 4 milliGRAM(s) every 6 hours PRN  polyethylene glycol 3350 17 Gram(s) two times a day  senna 2 Tablet(s) at bedtime  simethicone 80 milliGRAM(s) four times a day  traMADol 25 milliGRAM(s) every 6 hours PRN  traMADol 50 milliGRAM(s) every 6 hours PRN      RECENT LABS/IMAGING                        13.2   7.70  )-----------( 231      ( 11 Aug 2023 06:00 )             38.1     08-11    135  |  99  |  16  ----------------------------<  84  4.0   |  29  |  0.63    Ca    8.9      11 Aug 2023 06:00    TPro  6.3  /  Alb  2.6<L>  /  TBili  0.9  /  DBili  x   /  AST  23  /  ALT  18  /  AlkPhos  83  08-11      Urinalysis Basic - ( 11 Aug 2023 06:00 )    Color: x / Appearance: x / SG: x / pH: x  Gluc: 84 mg/dL / Ketone: x  / Bili: x / Urobili: x   Blood: x / Protein: x / Nitrite: x   Leuk Esterase: x / RBC: x / WBC x   Sq Epi: x / Non Sq Epi: x / Bacteria: x                ---------  PHYSICAL EXAM  Constitutional - NAD, Comfortable, in bed   Pulm - Breathing comfortably  Abd - Soft, NTND  Extremities - No edema, No calf tenderness  Neurologic Exam -       follows commands              Cognitive - Awake, Alert, oriented x 3     Communication - Fluent     Motor - moves all ext      Sensory - Intact to LT  Psychiatric - Mood WNL, Affect WNL    ASSESSMENT/PLAN  83y Female h/o HTN, HLD with functional deficits after SBO, s/p laparotomy with debility   emesis overnight, KUB ordered, +BM, continue to monitor  Continue current medical management  Pain - Tylenol PRN lidocaine  DVT PPX - lovenox   continue 3hrs a day of comprehensive rehab program.

## 2023-08-12 NOTE — PROGRESS NOTE ADULT - SUBJECTIVE AND OBJECTIVE BOX
Hospitalist: Kory Parmar DO    CHIEF COMPLAINT: Patient is a 83y old  female who presents with a chief complaint of Debility due to small bowel obstruction s/p surgery and lysis of adhesion (12 Aug 2023 09:41)      SUBJECTIVE / OVERNIGHT EVENTS: Patient seen and examined. No acute events overnight. Pain well controlled and patient without any complaints.    MEDICATIONS  (STANDING):  amLODIPine   Tablet 5 milliGRAM(s) Oral <User Schedule>  enoxaparin Injectable 40 milliGRAM(s) SubCutaneous every 24 hours  lidocaine   4% Patch 2 Patch Transdermal <User Schedule>  lisinopril 20 milliGRAM(s) Oral daily  polyethylene glycol 3350 17 Gram(s) Oral two times a day  senna 2 Tablet(s) Oral at bedtime  simethicone 80 milliGRAM(s) Chew four times a day    MEDICATIONS  (PRN):  acetaminophen     Tablet .. 650 milliGRAM(s) Oral every 6 hours PRN Mild Pain (1 - 3)  bisacodyl Suppository 10 milliGRAM(s) Rectal daily PRN Constipation  melatonin 3 milliGRAM(s) Oral at bedtime PRN Sleep  ondansetron    Tablet 4 milliGRAM(s) Oral every 6 hours PRN Nausea and/or Vomiting  traMADol 25 milliGRAM(s) Oral every 6 hours PRN Moderate Pain (4 - 6)  traMADol 50 milliGRAM(s) Oral every 6 hours PRN Severe Pain (7 - 10)      VITALS:  T(F): 98.6 (08-12-23 @ 07:30), Max: 98.6 (08-12-23 @ 07:30)  HR: 86 (08-12-23 @ 07:30) (73 - 86)  BP: 118/63 (08-12-23 @ 07:30) (105/67 - 127/78)  RR: 16 (08-12-23 @ 07:30) (16 - 16)  SpO2: 95% (08-12-23 @ 07:30)      REVIEW OF SYSTEMS:  For ROV please refer back to H&P     PHYSICAL EXAM:  GENERAL: NAD, well-groomed  HEAD:  Atraumatic, Normocephalic  EYES: EOMI, PERRL, conjunctiva and sclera clear  ENMT: dry mucous membranes, Good dentition  NECK: Supple, No JVD  CHEST/LUNG: Clear to auscultation bilaterally, non-labored breathing, good air entry  HEART: RRR; S1/S2, No murmur  ABDOMEN: Soft, Nontender, Nondistended; Bowel sounds present  VASCULAR: Normal pulses, Normal capillary refill  EXTREMITIES: No cyanosis, No edema      LABS:              13.2                 135  | 29   | 16           7.70  >-----------< 231     ------------------------< 84                    38.1                 4.0  | 99   | 0.63                                         Ca 8.9   Mg x     Ph x           TPro  6.3  /  Alb  2.6      TBili  0.9  /  DBili  x         AST  23  /  ALT  18            AlkPhos  83          MICROBIOLOGY:  Urinalysis Basic - ( 11 Aug 2023 06:00 )    Color: x / Appearance: x / SG: x / pH: x  Gluc: 84 mg/dL / Ketone: x  / Bili: x / Urobili: x   Blood: x / Protein: x / Nitrite: x   Leuk Esterase: x / RBC: x / WBC x   Sq Epi: x / Non Sq Epi: x / Bacteria: x    RADIOLOGY & ADDITIONAL TESTS:    Imaging Personally Reviewed:    [X] Consultant(s) Notes Reviewed:  [X] Care Discussed with Consultants/Other Providers:

## 2023-08-13 PROCEDURE — 99232 SBSQ HOSP IP/OBS MODERATE 35: CPT

## 2023-08-13 RX ADMIN — POLYETHYLENE GLYCOL 3350 17 GRAM(S): 17 POWDER, FOR SOLUTION ORAL at 05:15

## 2023-08-13 RX ADMIN — LIDOCAINE 2 PATCH: 4 CREAM TOPICAL at 19:46

## 2023-08-13 RX ADMIN — LISINOPRIL 20 MILLIGRAM(S): 2.5 TABLET ORAL at 05:15

## 2023-08-13 RX ADMIN — Medication 3 MILLIGRAM(S): at 22:24

## 2023-08-13 RX ADMIN — LIDOCAINE 2 PATCH: 4 CREAM TOPICAL at 08:14

## 2023-08-13 RX ADMIN — SIMETHICONE 80 MILLIGRAM(S): 80 TABLET, CHEWABLE ORAL at 19:46

## 2023-08-13 RX ADMIN — AMLODIPINE BESYLATE 5 MILLIGRAM(S): 2.5 TABLET ORAL at 17:47

## 2023-08-13 RX ADMIN — SIMETHICONE 80 MILLIGRAM(S): 80 TABLET, CHEWABLE ORAL at 05:15

## 2023-08-13 RX ADMIN — ENOXAPARIN SODIUM 40 MILLIGRAM(S): 100 INJECTION SUBCUTANEOUS at 17:47

## 2023-08-13 RX ADMIN — SIMETHICONE 80 MILLIGRAM(S): 80 TABLET, CHEWABLE ORAL at 13:01

## 2023-08-13 RX ADMIN — SIMETHICONE 80 MILLIGRAM(S): 80 TABLET, CHEWABLE ORAL at 17:47

## 2023-08-13 RX ADMIN — LIDOCAINE 2 PATCH: 4 CREAM TOPICAL at 17:53

## 2023-08-13 NOTE — PROGRESS NOTE ADULT - SUBJECTIVE AND OBJECTIVE BOX
No overnight events.  had BM this morning    REVIEW OF SYSTEMS  Constitutional - No fever,  No fatigue  Neurological - No headaches, No loss of strength  Musculoskeletal - No joint pain, No joint swelling, No muscle pain    VITALS  T(C): 37 (08-13-23 @ 08:17), Max: 37 (08-12-23 @ 19:17)  HR: 85 (08-13-23 @ 08:17) (66 - 96)  BP: 148/80 (08-13-23 @ 08:17) (105/62 - 148/80)  RR: 16 (08-13-23 @ 08:17) (16 - 16)  SpO2: 95% (08-13-23 @ 08:17) (94% - 95%)  Wt(kg): --       MEDICATIONS   acetaminophen     Tablet .. 650 milliGRAM(s) every 6 hours PRN  amLODIPine   Tablet 5 milliGRAM(s) <User Schedule>  bisacodyl Suppository 10 milliGRAM(s) daily PRN  enoxaparin Injectable 40 milliGRAM(s) every 24 hours  lidocaine   4% Patch 2 Patch <User Schedule>  lisinopril 20 milliGRAM(s) daily  melatonin 3 milliGRAM(s) at bedtime PRN  ondansetron    Tablet 4 milliGRAM(s) every 6 hours PRN  polyethylene glycol 3350 17 Gram(s) two times a day  senna 2 Tablet(s) at bedtime  simethicone 80 milliGRAM(s) four times a day  traMADol 25 milliGRAM(s) every 6 hours PRN  traMADol 50 milliGRAM(s) every 6 hours PRN      RECENT LABS/IMAGING              < from: Xray Abdomen 1 View (08.11.23 @ 23:51) >      INTERPRETATION:  HISTORY:  Small bowel obstruction and ischemic bowel   loop. Status post laparotomy and lysis of adhesions    TECHNIQUE:  A supine portable view of the abdomen was obtained.    FINDINGS:  Air is seen in nondilated loops of small and large bowel.   There is no gross obstruction or free intraperitoneal air. Vascular   calcifications are noted. There is no solid organomegaly. There are   degenerative changes in scoliosis noted in the lumbar spine.    IMPRESSION:  No evidence of bowel obstruction.    < end of copied text >                ---------  PHYSICAL EXAM  Constitutional - NAD, Comfortable, in bed   Pulm - Breathing comfortably  Abd - Soft, NTND  Extremities - No edema, No calf tenderness  Neurologic Exam -       follows commands              Cognitive - Awake, Alert, oriented x 3     Communication - Fluent     Motor - moves all ext      Sensory - Intact to LT  Psychiatric - Mood WNL, Affect WNL    ASSESSMENT/PLAN  83y Female h/o HTN, HLD with functional deficits after SBO, s/p laparotomy with debility   Continue current medical management  Pain - Tylenol PRN lidocaine  DVT PPX - lovenox   continue 3hrs a day of comprehensive rehab program.          No overnight events.  no BM this morning    REVIEW OF SYSTEMS  Constitutional - No fever,  No fatigue  Neurological - No headaches, No loss of strength  Musculoskeletal - No joint pain, No joint swelling, No muscle pain    VITALS  T(C): 37 (08-13-23 @ 08:17), Max: 37 (08-12-23 @ 19:17)  HR: 85 (08-13-23 @ 08:17) (66 - 96)  BP: 148/80 (08-13-23 @ 08:17) (105/62 - 148/80)  RR: 16 (08-13-23 @ 08:17) (16 - 16)  SpO2: 95% (08-13-23 @ 08:17) (94% - 95%)  Wt(kg): --       MEDICATIONS   acetaminophen     Tablet .. 650 milliGRAM(s) every 6 hours PRN  amLODIPine   Tablet 5 milliGRAM(s) <User Schedule>  bisacodyl Suppository 10 milliGRAM(s) daily PRN  enoxaparin Injectable 40 milliGRAM(s) every 24 hours  lidocaine   4% Patch 2 Patch <User Schedule>  lisinopril 20 milliGRAM(s) daily  melatonin 3 milliGRAM(s) at bedtime PRN  ondansetron    Tablet 4 milliGRAM(s) every 6 hours PRN  polyethylene glycol 3350 17 Gram(s) two times a day  senna 2 Tablet(s) at bedtime  simethicone 80 milliGRAM(s) four times a day  traMADol 25 milliGRAM(s) every 6 hours PRN  traMADol 50 milliGRAM(s) every 6 hours PRN      RECENT LABS/IMAGING              < from: Xray Abdomen 1 View (08.11.23 @ 23:51) >      INTERPRETATION:  HISTORY:  Small bowel obstruction and ischemic bowel   loop. Status post laparotomy and lysis of adhesions    TECHNIQUE:  A supine portable view of the abdomen was obtained.    FINDINGS:  Air is seen in nondilated loops of small and large bowel.   There is no gross obstruction or free intraperitoneal air. Vascular   calcifications are noted. There is no solid organomegaly. There are   degenerative changes in scoliosis noted in the lumbar spine.    IMPRESSION:  No evidence of bowel obstruction.    < end of copied text >                ---------  PHYSICAL EXAM  Constitutional - NAD, Comfortable, in bed   Pulm - Breathing comfortably  Abd - Soft, NTND  Extremities - No edema, No calf tenderness  Neurologic Exam -       follows commands              Cognitive - Awake, Alert, oriented x 3     Communication - Fluent     Motor - moves all ext      Sensory - Intact to LT  Psychiatric - Mood WNL, Affect WNL    ASSESSMENT/PLAN  83y Female h/o HTN, HLD with functional deficits after SBO, s/p laparotomy with debility   Continue current medical management  Pain - Tylenol PRN lidocaine  DVT PPX - lovenox   continue 3hrs a day of comprehensive rehab program.

## 2023-08-13 NOTE — PHYSICAL THERAPY INITIAL EVALUATION ADULT - MD/RN NOTIFIED
yes Pt BIBEMS for r/o aspiration.  As per pt wife, pt has been constipated for about 2 weeks, wife gave laxatives and stool softeners, pt finally having bms now, diarrhea.  Pt also vomited x 1 pta.  Afebrile at this time.  Pt has h/o TBI, nonverbal.  Denies any chest pain, EKG completed- AFIB.  No resp. distress noted, congested cough noted.  Healed pressure ulcer noted to sacrum/ buttocks area.  Maintain comfort and safety.

## 2023-08-13 NOTE — PROGRESS NOTE ADULT - SUBJECTIVE AND OBJECTIVE BOX
Hospitalist: Kory Parmar DO    CHIEF COMPLAINT: Patient is a 83y old  female who presents with a chief complaint of Debility due to small bowel obstruction s/p surgery and lysis of adhesion (13 Aug 2023 10:01)      SUBJECTIVE / OVERNIGHT EVENTS: Patient seen and examined. No acute events overnight. Pain well controlled and patient without any complaints.    MEDICATIONS  (STANDING):  amLODIPine   Tablet 5 milliGRAM(s) Oral <User Schedule>  enoxaparin Injectable 40 milliGRAM(s) SubCutaneous every 24 hours  lidocaine   4% Patch 2 Patch Transdermal <User Schedule>  lisinopril 20 milliGRAM(s) Oral daily  polyethylene glycol 3350 17 Gram(s) Oral two times a day  senna 2 Tablet(s) Oral at bedtime  simethicone 80 milliGRAM(s) Chew four times a day    MEDICATIONS  (PRN):  acetaminophen     Tablet .. 650 milliGRAM(s) Oral every 6 hours PRN Mild Pain (1 - 3)  bisacodyl Suppository 10 milliGRAM(s) Rectal daily PRN Constipation  melatonin 3 milliGRAM(s) Oral at bedtime PRN Sleep  ondansetron    Tablet 4 milliGRAM(s) Oral every 6 hours PRN Nausea and/or Vomiting  traMADol 25 milliGRAM(s) Oral every 6 hours PRN Moderate Pain (4 - 6)  traMADol 50 milliGRAM(s) Oral every 6 hours PRN Severe Pain (7 - 10)      VITALS:  T(F): 98.6 (08-13-23 @ 08:17), Max: 98.6 (08-12-23 @ 19:17)  HR: 85 (08-13-23 @ 08:17) (66 - 96)  BP: 148/80 (08-13-23 @ 08:17) (105/62 - 148/80)  RR: 16 (08-13-23 @ 08:17) (16 - 16)  SpO2: 95% (08-13-23 @ 08:17)      REVIEW OF SYSTEMS:  For ROV please refer back to H&P     PHYSICAL EXAM:  GENERAL: NAD, well-groomed  HEAD:  Atraumatic, Normocephalic  EYES: EOMI, PERRL, conjunctiva and sclera clear  ENMT: dry mucous membranes, Good dentition  NECK: Supple, No JVD  CHEST/LUNG: Clear to auscultation bilaterally, non-labored breathing, good air entry  HEART: RRR; S1/S2, No murmur  ABDOMEN: Soft, Nontender, Nondistended; Bowel sounds present  VASCULAR: Normal pulses, Normal capillary refill  EXTREMITIES: No cyanosis, No edema      RADIOLOGY & ADDITIONAL TESTS:    Imaging Personally Reviewed:    [X] Consultant(s) Notes Reviewed:  [X] Care Discussed with Consultants/Other Providers:

## 2023-08-14 LAB
ANION GAP SERPL CALC-SCNC: 8 MMOL/L — SIGNIFICANT CHANGE UP (ref 5–17)
BUN SERPL-MCNC: 15 MG/DL — SIGNIFICANT CHANGE UP (ref 7–23)
CALCIUM SERPL-MCNC: 9.1 MG/DL — SIGNIFICANT CHANGE UP (ref 8.4–10.5)
CHLORIDE SERPL-SCNC: 101 MMOL/L — SIGNIFICANT CHANGE UP (ref 96–108)
CO2 SERPL-SCNC: 28 MMOL/L — SIGNIFICANT CHANGE UP (ref 22–31)
CREAT SERPL-MCNC: 0.6 MG/DL — SIGNIFICANT CHANGE UP (ref 0.5–1.3)
EGFR: 88 ML/MIN/1.73M2 — SIGNIFICANT CHANGE UP
GLUCOSE SERPL-MCNC: 86 MG/DL — SIGNIFICANT CHANGE UP (ref 70–99)
HCT VFR BLD CALC: 36.3 % — SIGNIFICANT CHANGE UP (ref 34.5–45)
HGB BLD-MCNC: 12.4 G/DL — SIGNIFICANT CHANGE UP (ref 11.5–15.5)
MCHC RBC-ENTMCNC: 32 PG — SIGNIFICANT CHANGE UP (ref 27–34)
MCHC RBC-ENTMCNC: 34.2 GM/DL — SIGNIFICANT CHANGE UP (ref 32–36)
MCV RBC AUTO: 93.8 FL — SIGNIFICANT CHANGE UP (ref 80–100)
NRBC # BLD: 0 /100 WBCS — SIGNIFICANT CHANGE UP (ref 0–0)
PLATELET # BLD AUTO: 271 K/UL — SIGNIFICANT CHANGE UP (ref 150–400)
POTASSIUM SERPL-MCNC: 3.7 MMOL/L — SIGNIFICANT CHANGE UP (ref 3.5–5.3)
POTASSIUM SERPL-SCNC: 3.7 MMOL/L — SIGNIFICANT CHANGE UP (ref 3.5–5.3)
RBC # BLD: 3.87 M/UL — SIGNIFICANT CHANGE UP (ref 3.8–5.2)
RBC # FLD: 13.2 % — SIGNIFICANT CHANGE UP (ref 10.3–14.5)
SODIUM SERPL-SCNC: 137 MMOL/L — SIGNIFICANT CHANGE UP (ref 135–145)
WBC # BLD: 6.45 K/UL — SIGNIFICANT CHANGE UP (ref 3.8–10.5)
WBC # FLD AUTO: 6.45 K/UL — SIGNIFICANT CHANGE UP (ref 3.8–10.5)

## 2023-08-14 PROCEDURE — 99232 SBSQ HOSP IP/OBS MODERATE 35: CPT

## 2023-08-14 PROCEDURE — 74018 RADEX ABDOMEN 1 VIEW: CPT | Mod: 26

## 2023-08-14 RX ORDER — SIMETHICONE 80 MG/1
80 TABLET, CHEWABLE ORAL THREE TIMES A DAY
Refills: 0 | Status: DISCONTINUED | OUTPATIENT
Start: 2023-08-14 | End: 2023-08-14

## 2023-08-14 RX ORDER — POLYETHYLENE GLYCOL 3350 17 G/17G
17 POWDER, FOR SOLUTION ORAL DAILY
Refills: 0 | Status: DISCONTINUED | OUTPATIENT
Start: 2023-08-14 | End: 2023-08-15

## 2023-08-14 RX ORDER — SIMETHICONE 80 MG/1
80 TABLET, CHEWABLE ORAL ONCE
Refills: 0 | Status: COMPLETED | OUTPATIENT
Start: 2023-08-14 | End: 2023-08-14

## 2023-08-14 RX ORDER — TRAMADOL HYDROCHLORIDE 50 MG/1
25 TABLET ORAL EVERY 6 HOURS
Refills: 0 | Status: DISCONTINUED | OUTPATIENT
Start: 2023-08-14 | End: 2023-08-18

## 2023-08-14 RX ORDER — TRAMADOL HYDROCHLORIDE 50 MG/1
50 TABLET ORAL EVERY 6 HOURS
Refills: 0 | Status: DISCONTINUED | OUTPATIENT
Start: 2023-08-14 | End: 2023-08-18

## 2023-08-14 RX ADMIN — AMLODIPINE BESYLATE 5 MILLIGRAM(S): 2.5 TABLET ORAL at 05:11

## 2023-08-14 RX ADMIN — AMLODIPINE BESYLATE 5 MILLIGRAM(S): 2.5 TABLET ORAL at 18:12

## 2023-08-14 RX ADMIN — ENOXAPARIN SODIUM 40 MILLIGRAM(S): 100 INJECTION SUBCUTANEOUS at 18:12

## 2023-08-14 RX ADMIN — LIDOCAINE 1 PATCH: 4 CREAM TOPICAL at 09:06

## 2023-08-14 RX ADMIN — SIMETHICONE 80 MILLIGRAM(S): 80 TABLET, CHEWABLE ORAL at 18:12

## 2023-08-14 RX ADMIN — SIMETHICONE 80 MILLIGRAM(S): 80 TABLET, CHEWABLE ORAL at 11:30

## 2023-08-14 RX ADMIN — SIMETHICONE 80 MILLIGRAM(S): 80 TABLET, CHEWABLE ORAL at 00:26

## 2023-08-14 RX ADMIN — SIMETHICONE 80 MILLIGRAM(S): 80 TABLET, CHEWABLE ORAL at 05:11

## 2023-08-14 RX ADMIN — LIDOCAINE 2 PATCH: 4 CREAM TOPICAL at 21:17

## 2023-08-14 RX ADMIN — TRAMADOL HYDROCHLORIDE 50 MILLIGRAM(S): 50 TABLET ORAL at 08:55

## 2023-08-14 RX ADMIN — LIDOCAINE 2 PATCH: 4 CREAM TOPICAL at 19:17

## 2023-08-14 RX ADMIN — TRAMADOL HYDROCHLORIDE 50 MILLIGRAM(S): 50 TABLET ORAL at 09:55

## 2023-08-14 RX ADMIN — LISINOPRIL 20 MILLIGRAM(S): 2.5 TABLET ORAL at 05:11

## 2023-08-14 NOTE — PROGRESS NOTE ADULT - SUBJECTIVE AND OBJECTIVE BOX
Subjective  Reports altered bowel function x 1 day, combination of loose and formed stool  No fever  Reports cramping right upper quadrant moving to epigastrum    ROS--No chest no nausea, or vomiting, no head ache  Abdominal cramp noted    Therapy--  Engaging in therapy,   Therapist cleared to ambulate with nursing staff within the uint     EXAM  Gen - NAD, Comfortable  HEENT - NAD  Neck - Supple, No limited ROM  Pulm - Clear   Cardiovascular - RRR, S1S2  Chest - Normal heart sounds  Abdomen - Soft, non tender, +bowel sounds   Extremities - No Cyanosis, no clubbing, no edema, no calf tenderness    Neuro-  Alert and fully oriented     Cranial Nerves - CN 2-12 intact.     Motor -                     LEFT    UE - 4+/5                    RIGHT UE -  4+/5                    LEFT    LE - 4-/5                    RIGHT LE - 4-/5        Sensory - Intact  to LT      Reflexes - DTR 2+      TTP over b/l anserine bursa L>R     Coordination - No dysmetria     Tone - normal  Psychiatric - Mood stable, Affect WNL  Skin:   lap site x 3 to left side of abdomen and umbilical incision with surgiglue    RECENT LABS/IMAGING             12.4   6.45  )-----------( 271      ( 14 Aug 2023 07:03 )             36.3     08-14    137  |  101  |  15  ----------------------------<  86  3.7   |  28  |  0.60    Ca    9.1      14 Aug 2023 07:03    Urinalysis Basic - ( 14 Aug 2023 07:03 )  Color: x / Appearance: x / SG: x / pH: x  Gluc: 86 mg/dL / Ketone: x  / Bili: x / Urobili: x   Blood: x / Protein: x / Nitrite: x   Leuk Esterase: x / RBC: x / WBC x   Sq Epi: x / Non Sq Epi: x / Bacteria: x      MEDICATIONS  (STANDING):  amLODIPine   Tablet 5 milliGRAM(s) Oral <User Schedule>  enoxaparin Injectable 40 milliGRAM(s) SubCutaneous every 24 hours  lidocaine   4% Patch 2 Patch Transdermal <User Schedule>  lisinopril 20 milliGRAM(s) Oral daily  polyethylene glycol 3350 17 Gram(s) Oral daily  simethicone 80 milliGRAM(s) Chew four times a day    MEDICATIONS  (PRN):  acetaminophen     Tablet .. 650 milliGRAM(s) Oral every 6 hours PRN Mild Pain (1 - 3)  bisacodyl Suppository 10 milliGRAM(s) Rectal daily PRN Constipation  melatonin 3 milliGRAM(s) Oral at bedtime PRN Sleep  ondansetron    Tablet 4 milliGRAM(s) Oral every 6 hours PRN Nausea and/or Vomiting  traMADol 25 milliGRAM(s) Oral every 6 hours PRN Moderate Pain (4 - 6)  traMADol 50 milliGRAM(s) Oral every 6 hours PRN Severe Pain (7 - 10)

## 2023-08-14 NOTE — PROGRESS NOTE ADULT - ATTENDING COMMENTS
Seen and examined      82 yo woman, Hx; HTN and HLD  Acute care presentation  [facility] on 8/6 for abdominal pain.   Dxed with closed loop small bowel obstruction and ischemic loop of small bowel., Now s/p t diagnostic laparoscopy with laparotomy with Lysis of adhesion for SBO on 8/6 with Dr Chisholm.. Acute rehab admission 8/10    Living alone, independent with ADLs, no gait device use  Hx of chronic back, knee and left foot pain  On injection treatments to back from her pain mgt physician    Ambulating minimal distance    Alert and fully oriented  MMT --antigravity all extremities  TTP left navicular at N point  TTP Rt anserine bursa    Midline abd surgical scar     RECENT LABS/IMAGING          Dx Debility due to diagnostic laparoscopy/laparotomy with Lysis of adhesion for SBO on 8/6   Commence therapy  DVT ppx lovenox  Continue current analgesic meds   Lidocaine patch to both knees and lower back  Continue bowel regimen  Collateral hx from daughter in law, staff of [facility]  Liaison with her pain mgt physician  Ext dc x 2 wks

## 2023-08-14 NOTE — PROGRESS NOTE ADULT - ASSESSMENT
83F with HTN, HLD, recent surgery (HAROON) for SBO, now in acute rehab    #Debility  #Ischemic small bowel due to SBO s/p HAROON  -pain control  -PT/OT    #Essential HTN  -c/w Norvasc  -Lisinopril 20mg daily  -BP acceptable   -would hold off on HCTZ indefinitely, risks>benefits in this age group    #HLD  -c/w Zetia as outpatient (unless patient brings on from home, then okay to resume)    #DVT ppx: Lovenox

## 2023-08-14 NOTE — PROGRESS NOTE ADULT - ASSESSMENT
Assessment/Plan:  KD CONNELLY is a 83y with PMH of HTN and HLD presented to [facility] on 8/6 for abdominal pain, found to have ischemic small bowel and SBO, s/p laparotomy with HAROON on 8/6 with Dr. Chisholm. Patient now admitted for a multidisciplinary rehab program. 08-10-23 @ 13:01    * Collateral hx from family and pain mgt  * Altered bowel function--f/u Abd xray  * Podiatry recs for Left foot pain appreciated  * Improving with therapy    # Debility, SBO, small bowel ischemia  - s/p diagnostic laparoscopy with laparotomy with Lysis of adhesion  for SBO on 8/6 with Dr Chisholm  - Diet advanced, now tolerating regular diet  - Contnue comprehensive rehab program of PT/OT/SLP - 3 hours a day, 5 days a week. P&O as needed    HTN  - HCTZ and lisinopril were initially held, consider resuming if needed  - Continue norvasc 5mg BID    *  Altered bowel function--f/u Abd xray 8/14    HLD  - Continue Zetia 10mg qd (nonformulary, f/u with hospitalist regarding reinstating. Medication was discontinued on medical unit).    Pain  - Continue Tylenol and tramadol PRN    Sleep  - Continue Melatonin PRN     GI / Bowel  --Continue  Senna qHS  - Continue Miralax PRN Daily    * Left foot pain---Podiatry recs for Left foot pain appreciated   / Bladder--voiding appropriately     Skin / Pressure injury  - Skin assessment on admission performed : abdominal incision with surgiglue  - Turn q2 hours in bed while awake, air mattress  - nursing to monitor skin q Shift  - soft heel protectors  - skin barrier cream PRN    Diet/Dysphagia:  - Diet Consistency: Regular    DVT prophylaxis:   -Continue  Lovenox    # Labs--8/14--results reviewed, normal Hb, Renal and liver function    Outpatient Follow-up:  Skip Godwin  Internal Medicine  [address] [place] [address]  Phone: [phone]  Fax: [phone]  Follow Up Time:     Simon Chisholm  Surgery  [address] [place] [address], [address]  [place] [address]  Phone: [phone]  Fax: [phone]  Follow Up Time:    ---------------  Goals: Safe discharge to home  Estimated Length of Stay: 10-14 days  Rehab Potential: Good  Medical Prognosis: Good  Estimated Disposition: Home with home care     Assessment/Plan:  KD CONNELLY is a 83y with PMH of HTN and HLD presented to [facility] on 8/6 for abdominal pain, found to have ischemic small bowel and SBO, s/p laparotomy with HAROON on 8/6 with Dr. Chisholm. Patient now admitted for a multidisciplinary rehab program. 08-10-23 @ 13:01    * Collateral hx from family and pain mgt  * Altered bowel function--f/u Abd xray  * Podiatry recs for Left foot pain appreciated  * Improving with therapy  * Commence metamucil and d/c miralax     # Debility, SBO, small bowel ischemia  - s/p diagnostic laparoscopy with laparotomy with Lysis of adhesion  for SBO on 8/6 with Dr Chisholm  - Diet advanced, now tolerating regular diet  - Contnue comprehensive rehab program of PT/OT/SLP - 3 hours a day, 5 days a week. P&O as needed    HTN  - HCTZ and lisinopril were initially held, consider resuming if needed  - Continue norvasc 5mg BID    *  Altered bowel function--f/u Abd xray 8/14    HLD  - Continue Zetia 10mg qd (nonformulary, f/u with hospitalist regarding reinstating. Medication was discontinued on medical unit).    Pain  - Continue Tylenol and tramadol PRN    Sleep  - Continue Melatonin PRN     GI / Bowel  --Continue  Senna qHS  - Continue Miralax PRN Daily    * Left foot pain---Podiatry recs for Left foot pain appreciated   / Bladder--voiding appropriately     Skin / Pressure injury  - Skin assessment on admission performed : abdominal incision with surgiglue  - Turn q2 hours in bed while awake, air mattress  - nursing to monitor skin q Shift  - soft heel protectors  - skin barrier cream PRN    Diet/Dysphagia:  - Diet Consistency: Regular    DVT prophylaxis:   -Continue  Lovenox    # Labs--8/14--results reviewed, normal Hb, Renal and liver function    NOK--Son--Mr Susan Mallory --daughter in [facility]/Staff of [facility],  [phone]     Liaison with family  8/14--I called on ph and d/w patient's son Mr Davis (ph as in EMR) and daughter in law Ms Mallory --ph as stated  She report that patient is on f/u with The same cardiology group her ar [facility] and they would appreciate a f/u review as she was due one prior to her hosp admission  Reports that patient has lumbar stenosis with previous BULL, due next one today 8/14, (deferred due to her recent surgery), --Dr Mora  ph   Hx of diverticular disease, bowel function maintained with metamucil  Patient lives alone, family is currently trying to get her a helper  I gave update on patient's functional progress, Abd xray, recent altered bowel movements and our management plan, therapy update, Podiatry review for left foot pain and our management for her back and b/l knee pain with lidocaine patch  I told her that we would d/c miralax and commence metamucil  She was happy with the update    Outpatient Follow-up:  Skip Godwin  Internal Medicine  [address] [place] [address]  Phone: [phone]  Fax: [phone]  Follow Up Time:     Simon Chisholm  Surgery  [address] [place] [address], [address] [place] [address]  Phone: [phone]  Fax: [phone]  Follow Up Time:    [facility]  Dr Pat/Paulina hernandez   ---------------  Goals: Safe discharge to home  Estimated Length of Stay: 10-14 days  Rehab Potential: Good  Medical Prognosis: Good  Estimated Disposition: Home with home care

## 2023-08-14 NOTE — PROGRESS NOTE ADULT - SUBJECTIVE AND OBJECTIVE BOX
Patient is a 83y old  Female who presents with a chief complaint of Debility due to small bowel obstruction s/p surgery and lysis of adhesion (13 Aug 2023 14:18)    Patient seen and examined at bedside. Poor sleep overnight due to gas/nonspecific abdominal pain  per nurse improved with simethicone     ALLERGIES:  lactose (Unknown)  No Known Allergies    MEDICATIONS  (STANDING):  amLODIPine   Tablet 5 milliGRAM(s) Oral <User Schedule>  enoxaparin Injectable 40 milliGRAM(s) SubCutaneous every 24 hours  lidocaine   4% Patch 2 Patch Transdermal <User Schedule>  lisinopril 20 milliGRAM(s) Oral daily  polyethylene glycol 3350 17 Gram(s) Oral daily  simethicone 80 milliGRAM(s) Chew four times a day    MEDICATIONS  (PRN):  acetaminophen     Tablet .. 650 milliGRAM(s) Oral every 6 hours PRN Mild Pain (1 - 3)  bisacodyl Suppository 10 milliGRAM(s) Rectal daily PRN Constipation  melatonin 3 milliGRAM(s) Oral at bedtime PRN Sleep  ondansetron    Tablet 4 milliGRAM(s) Oral every 6 hours PRN Nausea and/or Vomiting  traMADol 50 milliGRAM(s) Oral every 6 hours PRN Severe Pain (7 - 10)  traMADol 25 milliGRAM(s) Oral every 6 hours PRN Moderate Pain (4 - 6)    Vital Signs Last 24 Hrs  T(F): 97.4 (14 Aug 2023 09:04), Max: 98 (13 Aug 2023 19:42)  HR: 89 (14 Aug 2023 09:04) (67 - 91)  BP: 131/75 (14 Aug 2023 09:04) (131/75 - 158/77)  RR: 16 (14 Aug 2023 09:04) (16 - 17)  SpO2: 97% (14 Aug 2023 09:04) (97% - 98%)  I&O's Summary    13 Aug 2023 07:01  -  14 Aug 2023 07:00  --------------------------------------------------------  IN: 0 mL / OUT: 4 mL / NET: -4 mL    BMI (kg/m2): 26.3 (08-10-23 @ 17:00)    PHYSICAL EXAM:  General: NAD, awake, alert  ENT: MMM, no tonsilar exudate  Neck: Supple, No JVD  Lungs: Clear to auscultation bilaterally, no wheezes. Good air entry bilaterally   Cardio: RRR, S1/S2, No murmurs  Abdomen: Soft, Nontender, Nondistended; Bowel sounds present. incision site c/d/i healing well  Extremities: No calf tenderness, No pitting edema    LABS:                        12.4   6.45  )-----------( 271      ( 14 Aug 2023 07:03 )             36.3       08-14    137  |  101  |  15  ----------------------------<  86  3.7   |  28  |  0.60    Ca    9.1      14 Aug 2023 07:03    08-07 Chol 176 mg/dL LDL -- HDL 99 mg/dL Trig 40 mg/dL    Urinalysis Basic - ( 14 Aug 2023 07:03 )    Color: x / Appearance: x / SG: x / pH: x  Gluc: 86 mg/dL / Ketone: x  / Bili: x / Urobili: x   Blood: x / Protein: x / Nitrite: x   Leuk Esterase: x / RBC: x / WBC x   Sq Epi: x / Non Sq Epi: x / Bacteria: x    COVID-19 PCR: NotDetec (08-11-23 @ 05:04)    RADIOLOGY & ADDITIONAL TESTS:     Care Discussed with Consultants/Other Providers:

## 2023-08-15 ENCOUNTER — TRANSCRIPTION ENCOUNTER (OUTPATIENT)
Age: 83
End: 2023-08-15

## 2023-08-15 PROCEDURE — 99232 SBSQ HOSP IP/OBS MODERATE 35: CPT

## 2023-08-15 RX ORDER — PSYLLIUM SEED (WITH DEXTROSE)
1 POWDER (GRAM) ORAL
Qty: 0 | Refills: 0 | DISCHARGE
Start: 2023-08-15

## 2023-08-15 RX ORDER — EZETIMIBE 10 MG/1
1 TABLET ORAL
Refills: 0 | DISCHARGE

## 2023-08-15 RX ORDER — EZETIMIBE 10 MG/1
1 TABLET ORAL
Qty: 0 | Refills: 0 | DISCHARGE
Start: 2023-08-15

## 2023-08-15 RX ORDER — TRAMADOL HYDROCHLORIDE 50 MG/1
0.5 TABLET ORAL
Qty: 21 | Refills: 0
Start: 2023-08-15 | End: 2023-08-21

## 2023-08-15 RX ORDER — ACETAMINOPHEN 500 MG
2 TABLET ORAL
Qty: 0 | Refills: 0 | DISCHARGE
Start: 2023-08-15

## 2023-08-15 RX ORDER — EZETIMIBE 10 MG/1
10 TABLET ORAL AT BEDTIME
Refills: 0 | Status: DISCONTINUED | OUTPATIENT
Start: 2023-08-15 | End: 2023-08-22

## 2023-08-15 RX ORDER — EZETIMIBE 10 MG/1
10 TABLET ORAL DAILY
Refills: 0 | Status: DISCONTINUED | OUTPATIENT
Start: 2023-08-15 | End: 2023-08-15

## 2023-08-15 RX ORDER — HYDROCHLOROTHIAZIDE 25 MG
1 TABLET ORAL
Qty: 0 | Refills: 0 | DISCHARGE

## 2023-08-15 RX ORDER — LISINOPRIL 2.5 MG/1
1 TABLET ORAL
Qty: 0 | Refills: 0 | DISCHARGE

## 2023-08-15 RX ORDER — PSYLLIUM SEED (WITH DEXTROSE)
1 POWDER (GRAM) ORAL
Refills: 0 | Status: DISCONTINUED | OUTPATIENT
Start: 2023-08-15 | End: 2023-08-22

## 2023-08-15 RX ORDER — LANOLIN ALCOHOL/MO/W.PET/CERES
1 CREAM (GRAM) TOPICAL
Qty: 0 | Refills: 0 | DISCHARGE
Start: 2023-08-15

## 2023-08-15 RX ORDER — LIDOCAINE 4 G/100G
1 CREAM TOPICAL ONCE
Refills: 0 | Status: COMPLETED | OUTPATIENT
Start: 2023-08-15 | End: 2023-08-15

## 2023-08-15 RX ORDER — LISINOPRIL 2.5 MG/1
40 TABLET ORAL DAILY
Refills: 0 | Status: DISCONTINUED | OUTPATIENT
Start: 2023-08-16 | End: 2023-08-22

## 2023-08-15 RX ORDER — LIDOCAINE 4 G/100G
1 CREAM TOPICAL
Refills: 0 | Status: DISCONTINUED | OUTPATIENT
Start: 2023-08-16 | End: 2023-08-22

## 2023-08-15 RX ORDER — AMLODIPINE BESYLATE 2.5 MG/1
1 TABLET ORAL
Qty: 60 | Refills: 0
Start: 2023-08-15 | End: 2023-09-13

## 2023-08-15 RX ORDER — LANOLIN ALCOHOL/MO/W.PET/CERES
3 CREAM (GRAM) TOPICAL AT BEDTIME
Refills: 0 | Status: DISCONTINUED | OUTPATIENT
Start: 2023-08-15 | End: 2023-08-22

## 2023-08-15 RX ORDER — ONDANSETRON 8 MG/1
1 TABLET, FILM COATED ORAL
Qty: 120 | Refills: 0
Start: 2023-08-15 | End: 2023-09-13

## 2023-08-15 RX ORDER — AMLODIPINE BESYLATE 2.5 MG/1
0 TABLET ORAL
Qty: 0 | Refills: 0 | DISCHARGE

## 2023-08-15 RX ORDER — CALCITONIN SALMON 200 [IU]/ML
1 INJECTION, SOLUTION INTRAMUSCULAR
Refills: 0 | DISCHARGE

## 2023-08-15 RX ADMIN — SIMETHICONE 80 MILLIGRAM(S): 80 TABLET, CHEWABLE ORAL at 05:28

## 2023-08-15 RX ADMIN — LIDOCAINE 1 PATCH: 4 CREAM TOPICAL at 20:01

## 2023-08-15 RX ADMIN — LISINOPRIL 20 MILLIGRAM(S): 2.5 TABLET ORAL at 05:28

## 2023-08-15 RX ADMIN — LIDOCAINE 2 PATCH: 4 CREAM TOPICAL at 08:50

## 2023-08-15 RX ADMIN — SIMETHICONE 80 MILLIGRAM(S): 80 TABLET, CHEWABLE ORAL at 18:11

## 2023-08-15 RX ADMIN — AMLODIPINE BESYLATE 5 MILLIGRAM(S): 2.5 TABLET ORAL at 18:11

## 2023-08-15 RX ADMIN — SIMETHICONE 80 MILLIGRAM(S): 80 TABLET, CHEWABLE ORAL at 00:47

## 2023-08-15 RX ADMIN — AMLODIPINE BESYLATE 5 MILLIGRAM(S): 2.5 TABLET ORAL at 05:57

## 2023-08-15 RX ADMIN — EZETIMIBE 10 MILLIGRAM(S): 10 TABLET ORAL at 20:44

## 2023-08-15 RX ADMIN — LIDOCAINE 1 PATCH: 4 CREAM TOPICAL at 12:17

## 2023-08-15 RX ADMIN — ENOXAPARIN SODIUM 40 MILLIGRAM(S): 100 INJECTION SUBCUTANEOUS at 18:11

## 2023-08-15 RX ADMIN — SIMETHICONE 80 MILLIGRAM(S): 80 TABLET, CHEWABLE ORAL at 12:16

## 2023-08-15 RX ADMIN — TRAMADOL HYDROCHLORIDE 50 MILLIGRAM(S): 50 TABLET ORAL at 09:50

## 2023-08-15 RX ADMIN — Medication 3 MILLIGRAM(S): at 20:44

## 2023-08-15 RX ADMIN — LIDOCAINE 1 PATCH: 4 CREAM TOPICAL at 23:44

## 2023-08-15 RX ADMIN — LIDOCAINE 2 PATCH: 4 CREAM TOPICAL at 20:01

## 2023-08-15 RX ADMIN — LIDOCAINE 2 PATCH: 4 CREAM TOPICAL at 20:43

## 2023-08-15 RX ADMIN — TRAMADOL HYDROCHLORIDE 50 MILLIGRAM(S): 50 TABLET ORAL at 08:50

## 2023-08-15 RX ADMIN — Medication 1 PACKET(S): at 12:16

## 2023-08-15 NOTE — DISCHARGE NOTE PROVIDER - DETAILS OF MALNUTRITION DIAGNOSIS/DIAGNOSES
This patient has been assessed with a concern for Malnutrition and was treated during this hospitalization for the following Nutrition diagnosis/diagnoses:     -  08/11/2023: Moderate protein-calorie malnutrition

## 2023-08-15 NOTE — PROGRESS NOTE ADULT - SUBJECTIVE AND OBJECTIVE BOX
Patient is a 83y old  Female who presents with a chief complaint of Debility due to small bowel obstruction s/p surgery and lysis of adhesion (14 Aug 2023 11:50)    Patient seen and examined at bedside. No acute overnight events. Feels better today, denies abdominal pain.     ALLERGIES:  lactose (Unknown)  No Known Allergies    MEDICATIONS  (STANDING):  amLODIPine   Tablet 5 milliGRAM(s) Oral <User Schedule>  enoxaparin Injectable 40 milliGRAM(s) SubCutaneous every 24 hours  lidocaine   4% Patch 2 Patch Transdermal <User Schedule>  lisinopril 20 milliGRAM(s) Oral daily  polyethylene glycol 3350 17 Gram(s) Oral daily  psyllium Powder 1 Packet(s) Oral <User Schedule>  simethicone 80 milliGRAM(s) Chew four times a day    MEDICATIONS  (PRN):  acetaminophen     Tablet .. 650 milliGRAM(s) Oral every 6 hours PRN Mild Pain (1 - 3)  bisacodyl Suppository 10 milliGRAM(s) Rectal daily PRN Constipation  melatonin 3 milliGRAM(s) Oral at bedtime PRN Sleep  ondansetron    Tablet 4 milliGRAM(s) Oral every 6 hours PRN Nausea and/or Vomiting  traMADol 50 milliGRAM(s) Oral every 6 hours PRN Severe Pain (7 - 10)  traMADol 25 milliGRAM(s) Oral every 6 hours PRN Moderate Pain (4 - 6)    Vital Signs Last 24 Hrs  T(F): 97.8 (14 Aug 2023 20:40), Max: 97.8 (14 Aug 2023 20:40)  HR: 87 (15 Aug 2023 05:25) (87 - 95)  BP: 152/70 (15 Aug 2023 05:25) (131/75 - 155/76)  RR: 16 (14 Aug 2023 20:40) (16 - 16)  SpO2: 98% (14 Aug 2023 20:40) (97% - 98%)  I&O's Summary    14 Aug 2023 07:01  -  15 Aug 2023 07:00  --------------------------------------------------------  IN: 240 mL / OUT: 0 mL / NET: 240 mL    BMI (kg/m2): 26.3 (08-10-23 @ 17:00)    PHYSICAL EXAM:  General: NAD, awake, alert. pleasant elderly F  ENT: MMM, no tonsilar exudate  Neck: Supple, No JVD  Lungs: Clear to auscultation bilaterally, no wheezes. Good air entry bilaterally   Cardio: RRR, S1/S2, No murmurs  Abdomen: Soft, Nontender, Nondistended; Bowel sounds present  Extremities: No calf tenderness, No pitting edema    LABS:                        12.4   6.45  )-----------( 271      ( 14 Aug 2023 07:03 )             36.3       08-14    137  |  101  |  15  ----------------------------<  86  3.7   |  28  |  0.60    Ca    9.1      14 Aug 2023 07:03    08-07 Chol 176 mg/dL LDL -- HDL 99 mg/dL Trig 40 mg/dL    Urinalysis Basic - ( 14 Aug 2023 07:03 )    Color: x / Appearance: x / SG: x / pH: x  Gluc: 86 mg/dL / Ketone: x  / Bili: x / Urobili: x   Blood: x / Protein: x / Nitrite: x   Leuk Esterase: x / RBC: x / WBC x   Sq Epi: x / Non Sq Epi: x / Bacteria: x    COVID-19 PCR: NotDetec (08-11-23 @ 05:04)    RADIOLOGY & ADDITIONAL TESTS:     Care Discussed with Consultants/Other Providers:

## 2023-08-15 NOTE — PROGRESS NOTE ADULT - ASSESSMENT
Assessment/Plan:  KD CONNELLY is a 83y with PMH of HTN and HLD presented to [facility] on 8/6 for abdominal pain, found to have ischemic small bowel and SBO, s/p laparotomy with HAROON on 8/6 with Dr. Chisholm. Patient now admitted for a multidisciplinary rehab program. 08-10-23 @ 13:01    * Collateral hx from family  * Elevated BP --HTN meds revised--lisinopril dose increased   * Altered bowel function--Abd xray--normal gas pattern  * Commence metamucil and d/c miralax     # Debility,due to Small Bowel Obstruction, (small bowel ischemia)  - s/p diagnostic laparoscopy with laparotomy with Lysis of adhesion  for SBO on 8/6 with Dr Chisholm  - Diet advanced, now tolerating regular diet  - Contnue comprehensive rehab program of PT/OT/SLP - 3 hours a day, 5 days a week. P&O as needed    HTN (elevated bp)   - HCTZ and lisinopril (home meds)  - Continue norvasc 5mg BID  --Lisinopril dose increased 8/15  --Continue to hold HCTZ    *Altered bowel function 8/14--Abd xray--normal gas pattern    HLD  - Continue Zetia 10mg qd (Pharmacy agreed to restart it)    Pain  - Continue Tylenol and tramadol PRN    Sleep  - Continue Melatonin PRN     GI / Bowel  --D/c miralax, commence Metamucil     # Left foot pain---Podiatry recs for Left foot pain appreciated   / Bladder--voiding appropriately     Skin / Pressure injury  - Skin assessment on admission performed : abdominal incision with surgiglue  - Turn q2 hours in bed while awake, air mattress  - nursing to monitor skin q Shift  - soft heel protectors  - skin barrier cream PRN    Diet/Dysphagia:  - Diet Consistency: Regular    DVT prophylaxis:   -Continue  Lovenox    # Labs--8/14--results reviewed, normal Hb, Renal and liver function    NOK--Son--Mr Susan Mallory --daughter in law/Staff of [facility],  [phone]     Liaison with family  8/14--I called on ph and d/w patient's son Mr Davis (ph as in EMR) and daughter in law Ms Mallory --ph as stated  She report that patient is on f/u with The same cardiology group her ar [facility] and they would appreciate a f/u review as she was due one prior to her hosp admission  Reports that patient has lumbar stenosis with previous BULL, due next one today 8/14, (deferred due to her recent surgery), --Dr Mora  ph   Hx of diverticular disease, bowel function maintained with metamucil  Patient lives alone, family is currently trying to get her a helper  I gave update on patient's functional progress, Abd xray, recent altered bowel movements and our management plan, therapy update, Podiatry review for left foot pain and our management for her back and b/l knee pain with lidocaine patch  I told her that we would d/c miralax and commence metamucil  She was happy with the update    Outpatient Follow-up:  Skip Godwin  Internal Medicine  [address] [place] [address]  Phone: [phone]  Fax: [phone]  Follow Up Time:     Simon Chisholm  Surgery  [address] [place] [address], [address] [place] [address]  Phone: [phone]  Fax: [phone]  Follow Up Time:    [facility]  Dr Pat/Paulina hernandez   ---------------  Goals: Safe discharge to home  Estimated Length of Stay: 10-14 days  Rehab Potential: Good  Medical Prognosis: Good  Estimated Disposition: Home with home care    Non critical care  Time based billing   Spent 32 mins, patient review and furhter collateral from family and liaison with pharmacy for reconciliation of HLD meds   Assessment/Plan:  KD CONNELLY is a 83y with PMH of HTN and HLD presented to [facility] on 8/6 for abdominal pain, found to have ischemic small bowel and SBO, s/p laparotomy with HAROON on 8/6 with Dr. Chisholm. Patient now admitted for a multidisciplinary rehab program. 08-10-23 @ 13:01    * Collateral hx from family  * Elevated BP --HTN meds revised--lisinopril dose increased   * Altered bowel function--Abd xray--normal gas pattern  * Commence metamucil and d/c miralax     # Debility,due to Small Bowel Obstruction, (small bowel ischemia)  - s/p diagnostic laparoscopy with laparotomy with Lysis of adhesion  for SBO on 8/6 with Dr Chisholm  - Diet advanced, now tolerating regular diet  - Contnue comprehensive rehab program of PT/OT/SLP - 3 hours a day, 5 days a week. P&O as needed    HTN (elevated bp)   - HCTZ and lisinopril (home meds)  - Continue norvasc 5mg BID  --Lisinopril dose increased 8/15  --Continue to hold HCTZ    *Altered bowel function 8/14--Abd xray--normal gas pattern    HLD  - Continue Zetia 10mg qd (Pharmacy agreed to restart it)    Pain  - Continue Tylenol and tramadol PRN    Sleep  - Continue Melatonin PRN     GI / Bowel  --D/c miralax, commence Metamucil     # Left foot pain---Podiatry recs for Left foot pain appreciated   / Bladder--voiding appropriately     Skin / Pressure injury  - Skin assessment on admission performed : abdominal incision with surgiglue  - Turn q2 hours in bed while awake, air mattress  - nursing to monitor skin q Shift  - soft heel protectors  - skin barrier cream PRN    Diet/Dysphagia:  - Diet Consistency: Regular    DVT prophylaxis:   -Continue  Lovenox  ---------------  # Labs--8/14--results reviewed, normal Hb, Renal and liver function  ---------------  NOK--Son--Mr Susan Mallory --daughter in law/Staff of [facility],  [phone]     Liaison with family  8/14--I called on ph and d/w patient's son Mr Davis (ph as in EMR) and daughter in law Ms Mallory -- as stated  She report that patient is on f/u with The same cardiology group her ar [facility] and they would appreciate a f/u review as she was due one prior to her hosp admission  Reports that patient has lumbar stenosis with previous BULL, due next one today 8/14, (deferred due to her recent surgery), --Dr Mora     Hx of diverticular disease, bowel function maintained with metamucil  Patient lives alone, family is currently trying to get her a helper  I gave update on patient's functional progress, Abd xray, recent altered bowel movements and our management plan, therapy update, Podiatry review for left foot pain and our management for her back and b/l knee pain with lidocaine patch  I told her that we would d/c miralax and commence metamucil  She was happy with the update  ---------------  IDT conference on 8/15  TDD: 8/22 to home   Barriers: Lower back pain and L foot pain  Social Work: Lives alone in  with 1STE and 1HR. Has supportive family. Looking to private hire.   DME: Needs RW  OT: [facility] for ADLs and transfers.   PT: Supervision for transfers. Ambulated 150 ft wit RW supervision.  SLP: --.  ---------------  Outpatient Follow-up:  Skip Godwin  Internal Medicine  [address] [place] [address]  Phone: [phone]  Fax: [phone]  Follow Up Time:     Simon Chisholm  Surgery  [address] [place] [address], [address]  [place] [address]  Phone: [phone]  Fax: [phone]  Follow Up Time:    [facility]  Dr Pat/Paulina hernandez   ---------------

## 2023-08-15 NOTE — DISCHARGE NOTE PROVIDER - CARE PROVIDER_API CALL
Ca Randall  Physical/Rehab Medicine  [address] [place] [address]  Phone: [phone]  Fax: [phone]  Follow Up Time: 1 month    Skip Godwin  Internal Medicine  [address] [place] [address]  Phone: [phone]  Fax: [phone]  Follow Up Time: 1 month    Simon Chisholm  Surgery  [address] [place] [address], [address] [place] [address]  Phone: [phone]  Fax: [phone]  Follow Up Time: 1 week

## 2023-08-15 NOTE — DISCHARGE NOTE PROVIDER - PROVIDER TOKENS
PROVIDER:[TOKEN:[[id number]],FOLLOWUP:[1 month]],PROVIDER:[TOKEN:[[id number]],FOLLOWUP:[1 month]],PROVIDER:[TOKEN:[[id number]],FOLLOWUP:[1 week]]

## 2023-08-15 NOTE — DISCHARGE NOTE PROVIDER - NSDCMRMEDTOKEN_GEN_ALL_CORE_FT
acetaminophen 325 mg oral tablet: 2 tab(s) orally every 6 hours As needed Mild Pain (1 - 3)  bisacodyl 10 mg rectal suppository: 1 suppository(ies) rectal once a day As needed Constipation  ezetimibe 10 mg oral tablet: 1 tab(s) orally once a day (at bedtime)  melatonin 3 mg oral tablet: 1 tab(s) orally once a day (at bedtime)  psyllium 3.4 g/7 g oral powder for reconstitution: 1 packet(s) orally once a day   acetaminophen 325 mg oral tablet: 2 tab(s) orally every 6 hours As needed Mild Pain (1 - 3)  bisacodyl 10 mg rectal suppository: 1 suppository(ies) rectal once a day As needed Constipation  diclofenac 1% topical gel: Apply topically to affected area once a day  ezetimibe 10 mg oral tablet: 1 tab(s) orally once a day (at bedtime)  melatonin 3 mg oral tablet: 1 tab(s) orally once a day (at bedtime)  psyllium 3.4 g/7 g oral powder for reconstitution: 1 packet(s) orally once a day   acetaminophen 325 mg oral tablet: 2 tab(s) orally every 6 hours As needed Mild Pain (1 - 3)  bisacodyl 10 mg rectal suppository: 1 suppository(ies) rectal once a day As needed Constipation  ezetimibe 10 mg oral tablet: 1 tab(s) orally once a day (at bedtime)  melatonin 3 mg oral tablet: 1 tab(s) orally once a day (at bedtime)

## 2023-08-15 NOTE — DISCHARGE NOTE PROVIDER - NSDCCPCAREPLAN_GEN_ALL_CORE_FT
PRINCIPAL DISCHARGE DIAGNOSIS  Diagnosis: SBO (small bowel obstruction)  Assessment and Plan of Treatment: You presented to the hospital with abdominal pain and were found to have a small bowel obstruction requiring surgery. You were sent to  acute rehab to help improve your strength and overall function. Please follow up with the following providers listed in this packet for further management.      SECONDARY DISCHARGE DIAGNOSES  Diagnosis: HTN (hypertension)  Assessment and Plan of Treatment: Please continue to take your antihypertensive medications as prescribed. Please follow up with your PCP/Cardiologist for further management.    Diagnosis: Chronic back pain  Assessment and Plan of Treatment: Please continue to take your analgesic medications as prescribed.

## 2023-08-15 NOTE — DISCHARGE NOTE PROVIDER - HOSPITAL COURSE
HPI:  84 yo woman with PMH of HTN and HLD presented to [facility] on 8/6 for abdominal pain. Patient found to have ischemic small bowel. CTAP showed closed loop small bowel obstruction and ischemic loop of small bowel. She underwent diagnostic laparoscopy with laparotomy with Lysis of Adhesion for SBO on 8/6 with Dr Chisholm. Diet advanced, now tolerating regular diet. While admitted her HCTZ and lisinopril were held, resumed on discharge. Patient evaluated by PT/OT/PM&R and recommended for acute inpatient rehab. Patient is medically stable for discharge to [facility] acute rehab on 8/10. (10 Aug 2023 12:49)      Patient was evaluated by PM&R and therapy for gait/ADL impairments and recommended acute rehabilitation. Patient was medically optimized for discharge to [facility] on 8/10/23. Admitted with gait instability, ADL, and functional impairments.     Rehab course significant for:  8/11: Lidocaine patch to BL knees. Miralax BID. Suppository daily PRN. Podiatry consult.   8/14: Ab XR to assess stool burden. Senna DCd. Miralax to daily. Ice to L lateral dorsal foot. High fiber diet per dietary.   8/15: Lidocaine patch to lower back. Metamucil. Miralax DCd. Melatonin standing. Zetia started       All other medical co-morbidities were stable. Patient tolerated course of inpatient PT/OT/SLP rehab with significant improvements and met rehab goals prior to discharge. Patient was medically cleared on 8/22/23 for discharge to home with home care. HPI:  82 yo woman with PMH of HTN and HLD presented to [facility] on 8/6 for abdominal pain. Patient found to have ischemic small bowel. CTAP showed closed loop small bowel obstruction and ischemic loop of small bowel. She underwent diagnostic laparoscopy with laparotomy with Lysis of Adhesion for SBO on 8/6 with Dr Chisholm. Diet advanced, now tolerating regular diet. While admitted her HCTZ and lisinopril were held, resumed on discharge. Patient evaluated by PT/OT/PM&R and recommended for acute inpatient rehab. Patient is medically stable for discharge to [facility] acute rehab on 8/10. (10 Aug 2023 12:49)      Patient was evaluated by PM&R and therapy for gait/ADL impairments and recommended acute rehabilitation. Patient was medically optimized for discharge to [facility] on 8/10/23. Admitted with gait instability, ADL, and functional impairments.     Rehab course significant for:  8/11: Lidocaine patch to BL knees. Miralax BID. Suppository daily PRN. Podiatry consult.   8/14: Ab XR to assess stool burden. Senna DCd. Miralax to daily. Ice to L lateral dorsal foot. High fiber diet per dietary.   8/15: Lidocaine patch to lower back. Metamucil. Miralax DCd. Melatonin standing. Zetia started     You made sustained and significant progress with therapy, Your abdominal wound healing well, scar unremarkable  Your functional update as noted below  You have been counselled to use DMEs as instructed  CT abdomen/pelvis was unremarkable for any acute abdominal event. Tender areas on Rt epigastric paramedline region, resolved  You continued to have normal bowel and bladder function  Your HCTZ was held during this admission. your BP  was stabilized on your other HTN meds  We recommended that you continue f/u with your PCP   This has been explained to your daughter in law Ms Mallory (staff of [facility])   Your back and left arm pain responded to voltaren gel  Your daughter in law Ms Mallory, was very involved in your care  Surgical team saw you during your rehab course, reviewed imaging and they will continue f/u post discharge   IDT conference on 8/15  TDD: 8/22 to home   Barriers: Lower back pain and L foot pain  Social Work: Lives alone in  with 1STE and 1HR. Has supportive family. Looking to private hire.   DME: Needs RW  OT: [facility] for ADLs and transfers.   PT: Supervision for transfers. Ambulated 150 ft wit RW supervision.  SLP: --.N/A   All other medical co-morbidities were stable. Patient tolerated course of inpatient PT/OT/SLP rehab with significant improvements and met rehab goals prior to discharge. Patient was medically cleared on 8/22/23 for discharge to home with home care. HPI:  82 yo woman with PMH of HTN and HLD presented to [facility] on 8/6 for abdominal pain. Patient found to have ischemic small bowel. CTAP showed closed loop small bowel obstruction and ischemic loop of small bowel. She underwent diagnostic laparoscopy with laparotomy with Lysis of Adhesion for SBO on 8/6 with Dr Chisholm. Diet advanced, now tolerating regular diet. While admitted her HCTZ and lisinopril were held, resumed on discharge. Patient evaluated by PT/OT/PM&R and recommended for acute inpatient rehab. Patient is medically stable for discharge to [facility] acute rehab on 8/10. (10 Aug 2023 12:49)      Patient was evaluated by PM&R and therapy for gait/ADL impairments and recommended acute rehabilitation. Patient was medically optimized for discharge to [facility] on 8/10/23. Admitted with gait instability, ADL, and functional impairments.     Rehab course significant for:  8/11: Lidocaine patch to BL knees. Miralax BID. Suppository daily PRN. Podiatry consult.   8/14: Ab XR to assess stool burden. Senna DCd. Miralax to daily. Ice to L lateral dorsal foot. High fiber diet per dietary.   8/15: Lidocaine patch to lower back. Metamucil. Miralax DCd. Melatonin standing. Zetia started     You made sustained and significant progress with therapy, Your abdominal wound healing well, scar unremarkable  Your functional update as noted below  You have been counselled to use DMEs as instructed  CT abdomen/pelvis was unremarkable for any acute abdominal event. Tender areas on Rt epigastric paramedline region, resolved  You continued to have normal bowel and bladder function  Your HCTZ was held during this admission. your BP  was stabilized on your other HTN meds  We recommended that you continue f/u with your PCP   This has been explained to your daughter in law Ms Mallory (staff of [facility])   Your back and left arm pain responded to voltaren gel  Your daughter in law Ms Mallory, was very involved in your care  Surgical team saw you during your rehab course, reviewed imaging and they will continue f/u post discharge     IDT conference on 8/15  TDD: 8/22 to home   Barriers: Lower back pain and L foot pain  Social Work: Lives alone in  with 1STE and 1HR. Has supportive family. Looking to private hire.   DME: Needs RW  OT: [facility] for ADLs and transfers.   PT: Supervision for transfers. Ambulated 150 ft wit RW supervision.  SLP: --.N/A   All other medical co-morbidities were stable. Patient tolerated course of inpatient PT/OT/SLP rehab with significant improvements and met rehab goals prior to discharge. Patient was medically cleared on 8/22/23 for discharge to home with home care.

## 2023-08-15 NOTE — DISCHARGE NOTE PROVIDER - NSDCFUADDAPPT_GEN_ALL_CORE_FT
Please follow up with the following providers:  [facility]  Dr Pat/Paulina hernandez     Please follow up with your PCP as soon as possible.     If you are in need of a PCP or a specialist in your area: contact the [facility] Physician Referral Service at [phone].

## 2023-08-15 NOTE — PROGRESS NOTE ADULT - ASSESSMENT
83F with HTN, HLD, recent surgery (HAROON) for SBO, now in acute rehab    #Debility  #Ischemic small bowel due to SBO s/p HAROON  -pain control  -PT/OT    #Essential HTN  -c/w Norvasc  -Lisinopril 20mg daily  -BP acceptable   -would hold off on HCTZ indefinitely, risks>benefits in this age group    #HLD  -c/w Zetia as outpatient (unless patient brings on from home, then okay to resume)    #DVT ppx: Lovenox 83F with HTN, HLD, recent surgery (HAROON) for SBO, now in acute rehab    #Debility  #Ischemic small bowel due to SBO s/p HAROON  -pain control  -PT/OT    #Essential HTN  -c/w Norvasc  -Lisinopril 40mg daily  -BP acceptable   -would hold off on HCTZ indefinitely, risks>benefits in this age group    #HLD  -c/w Zetia as outpatient (unless patient brings on from home, then okay to resume)    #DVT ppx: Lovenox

## 2023-08-16 PROCEDURE — 99232 SBSQ HOSP IP/OBS MODERATE 35: CPT

## 2023-08-16 RX ORDER — ACETAMINOPHEN 500 MG
325 TABLET ORAL ONCE
Refills: 0 | Status: COMPLETED | OUTPATIENT
Start: 2023-08-16 | End: 2023-08-16

## 2023-08-16 RX ADMIN — Medication 1 PACKET(S): at 10:47

## 2023-08-16 RX ADMIN — AMLODIPINE BESYLATE 5 MILLIGRAM(S): 2.5 TABLET ORAL at 17:21

## 2023-08-16 RX ADMIN — Medication 325 MILLIGRAM(S): at 21:00

## 2023-08-16 RX ADMIN — LISINOPRIL 40 MILLIGRAM(S): 2.5 TABLET ORAL at 05:05

## 2023-08-16 RX ADMIN — Medication 325 MILLIGRAM(S): at 20:06

## 2023-08-16 RX ADMIN — ENOXAPARIN SODIUM 40 MILLIGRAM(S): 100 INJECTION SUBCUTANEOUS at 17:21

## 2023-08-16 RX ADMIN — LIDOCAINE 2 PATCH: 4 CREAM TOPICAL at 13:21

## 2023-08-16 RX ADMIN — LIDOCAINE 2 PATCH: 4 CREAM TOPICAL at 19:26

## 2023-08-16 RX ADMIN — AMLODIPINE BESYLATE 5 MILLIGRAM(S): 2.5 TABLET ORAL at 05:05

## 2023-08-16 RX ADMIN — SIMETHICONE 80 MILLIGRAM(S): 80 TABLET, CHEWABLE ORAL at 11:08

## 2023-08-16 RX ADMIN — EZETIMIBE 10 MILLIGRAM(S): 10 TABLET ORAL at 20:06

## 2023-08-16 RX ADMIN — Medication 3 MILLIGRAM(S): at 20:06

## 2023-08-16 RX ADMIN — SIMETHICONE 80 MILLIGRAM(S): 80 TABLET, CHEWABLE ORAL at 17:20

## 2023-08-16 NOTE — PROGRESS NOTE ADULT - SUBJECTIVE AND OBJECTIVE BOX
Subjective  Seen and examined with NP   Patient reports markedly improved function,  back pain controlled, bowel function normalized  Slept well  Abd cramping resolved  BP stabilized     ROS--No chest no nausea, or vomiting, no head ache  LBM 8/15    Therapy--Engaging in therapy.  Making sustained improvement with ambulatory distance     ICU Vital Signs Last 24 Hrs  T(C): 36.7 (15 Aug 2023 21:41), Max: 36.7 (15 Aug 2023 21:41)  T(F): 98 (15 Aug 2023 21:41), Max: 98 (15 Aug 2023 21:41)  HR: 81 (16 Aug 2023 05:10) (81 - 91)  BP: 112/77 (16 Aug 2023 05:10) (112/77 - 146/69)  RR: 16 (16 Aug 2023 05:10) (16 - 16)  SpO2: 96% (16 Aug 2023 05:10) (96% - 98%)  O2 Parameters below as of 15 Aug 2023 18:00  Patient On (Oxygen Delivery Method): room air      EXAM  Gen - Comfortable, no distress  HEENT - neck supple   Neck - Supple, No limited ROM  Pulm - Clear   Cardiovascular - RRR, S1S2  Chest - Normal heart sounds  Abdomen - Soft, non tender, +bowel sounds   Extremities - No Cyanosis, no clubbing, no edema, no calf tenderness    Neuro-  Alert and fully oriented     Cranial Nerves - CN 2-12 intact.     Motor -                     LEFT    UE - 4+/5                    RIGHT UE -  4+/5                    LEFT    LE - 4-/5                    RIGHT LE - 4-/5        Sensory - Intact  to LT      Reflexes - DTR 2+      Coordination - No dysmetria     Tone - normal  Psychiatric - Mood stable, Affect WNL  Skin: Lap site x 3 to left side of central abdomen     RECENT LABS/IMAGING             12.4   6.45  )-----------( 271      ( 14 Aug 2023 07:03 )             36.3     08-14    137  |  101  |  15  ----------------------------<  86  3.7   |  28  |  0.60    Ca    9.1      14 Aug 2023 07:03  LFT, renal fxn WNL on 8/11    Urinalysis Basic - ( 14 Aug 2023 07:03 )  Color: x / Appearance: x / SG: x / pH: x  Gluc: 86 mg/dL / Ketone: x  / Bili: x / Urobili: x   Blood: x / Protein: x / Nitrite: x   Leuk Esterase: x / RBC: x / WBC x   Sq Epi: x / Non Sq Epi: x / Bacteria: x      MEDICATIONS  (STANDING):  amLODIPine   Tablet 5 milliGRAM(s) Oral <User Schedule>  enoxaparin Injectable 40 milliGRAM(s) SubCutaneous every 24 hours  lidocaine   4% Patch 2 Patch Transdermal <User Schedule>  lidocaine   4% Patch 1 Patch Transdermal once  lisinopril 20 milliGRAM(s) Oral daily  melatonin 3 milliGRAM(s) Oral at bedtime  psyllium Powder 1 Packet(s) Oral <User Schedule>  simethicone 80 milliGRAM(s) Chew four times a day    MEDICATIONS  (PRN):  acetaminophen     Tablet .. 650 milliGRAM(s) Oral every 6 hours PRN Mild Pain (1 - 3)  bisacodyl Suppository 10 milliGRAM(s) Rectal daily PRN Constipation  ondansetron    Tablet 4 milliGRAM(s) Oral every 6 hours PRN Nausea and/or Vomiting  traMADol 50 milliGRAM(s) Oral every 6 hours PRN Severe Pain (7 - 10)  traMADol 25 milliGRAM(s) Oral every 6 hours PRN Moderate Pain (4 - 6)

## 2023-08-16 NOTE — PROGRESS NOTE ADULT - ASSESSMENT
Assessment/Plan:  KD CONNELLY is a 83y with PMH of HTN and HLD presented to [facility] on 8/6 for abdominal pain, found to have ischemic small bowel and SBO, s/p laparotomy with HAROON on 8/6 with Dr. Chisholm. Patient now admitted for a multidisciplinary rehab program. 08-10-23 @ 13:01    * Bowel function normalized  * BP controlled     # Debility,due to Small Bowel Obstruction, (small bowel ischemia)  - s/p diagnostic laparoscopy with laparotomy with Lysis of adhesion  for SBO on 8/6 with Dr Chisholm  - Diet advanced, now tolerating regular diet  - [facility] comprehensive rehab program of PT/OT/SLP - 3 hours a day, 5 days a week. P&O as needed    HTN (elevated bp)   - HCTZ and lisinopril (home meds)  - Continue norvasc 5mg BID  --Lisinopril dose increased 8/15  --Continue to hold HCTZ    *Altered bowel function 8/14--Abd xray--normal gas pattern-bowel function normalized    HLD  - Continue Zetia 10mg qd (Pharmacy agreed to restart it)    Pain  - Continue Tylenol and tramadol PRN    Sleep  - Continue Melatonin PRN     GI / Bowel  Continue Metamucil     # Left foot pain---Podiatry recs for Left foot pain appreciated   / Bladder--voiding appropriately     Skin / Pressure injury  - Skin assessment on admission performed : abdominal incision with surgiglue  - Turn q2 hours in bed while awake, air mattress  - nursing to monitor skin q Shift  - soft heel protectors  - skin barrier cream PRN    Diet/Dysphagia:  - Diet Consistency: Regular    DVT prophylaxis:   -Continue  Lovenox  ---------------  # Labs--8/14--results reviewed, normal Hb, Renal and liver function  ---------------  NOK--Son--Mr Susan Mallory --daughter in law/Staff of [facility],  [phone]     Liaison with family  8/14--I called on ph and d/w patient's son Mr Davis (ph as in EMR) and daughter in law Ms Mallory --ph as stated  She report that patient is on f/u with The same cardiology group her ar [facility] and they would appreciate a f/u review as she was due one prior to her hosp admission  Reports that patient has lumbar stenosis with previous BULL, due next one today 8/14, (deferred due to her recent surgery), --Dr Mora     Hx of diverticular disease, bowel function maintained with metamucil  Patient lives alone, family is currently trying to get her a helper  I gave update on patient's functional progress, Abd xray, recent altered bowel movements and our management plan, therapy update, Podiatry review for left foot pain and our management for her back and b/l knee pain with lidocaine patch  I told her that we would d/c miralax and commence metamucil  She was happy with the update  ---------------  IDT conference on 8/15  TDD: 8/22 to home   Barriers: Lower back pain and L foot pain  Social Work: Lives alone in  with 1STE and 1HR. Has supportive family. Looking to private hire.   DME: Needs RW  OT: [facility] for ADLs and transfers.   PT: Supervision for transfers. Ambulated 150 ft wit RW supervision.  SLP: --.  ---------------  Outpatient Follow-up:  Skip Godwin  Internal Medicine  [address] [place] [address]  Phone: [phone]  Fax: [phone]  Follow Up Time:     Simon Chisholm  Surgery  [address] [place] [address], [address]  [place] [address]  Phone: [phone]  Fax: [phone]  Follow Up Time:    [facility]  Dr Pat/Paulina hernandez   ---------------

## 2023-08-17 LAB
ANION GAP SERPL CALC-SCNC: 9 MMOL/L — SIGNIFICANT CHANGE UP (ref 5–17)
BUN SERPL-MCNC: 18 MG/DL — SIGNIFICANT CHANGE UP (ref 7–23)
CALCIUM SERPL-MCNC: 9.4 MG/DL — SIGNIFICANT CHANGE UP (ref 8.4–10.5)
CHLORIDE SERPL-SCNC: 102 MMOL/L — SIGNIFICANT CHANGE UP (ref 96–108)
CO2 SERPL-SCNC: 27 MMOL/L — SIGNIFICANT CHANGE UP (ref 22–31)
CREAT SERPL-MCNC: 0.76 MG/DL — SIGNIFICANT CHANGE UP (ref 0.5–1.3)
EGFR: 77 ML/MIN/1.73M2 — SIGNIFICANT CHANGE UP
GLUCOSE SERPL-MCNC: 96 MG/DL — SIGNIFICANT CHANGE UP (ref 70–99)
HCT VFR BLD CALC: 38.1 % — SIGNIFICANT CHANGE UP (ref 34.5–45)
HGB BLD-MCNC: 13 G/DL — SIGNIFICANT CHANGE UP (ref 11.5–15.5)
MCHC RBC-ENTMCNC: 32.2 PG — SIGNIFICANT CHANGE UP (ref 27–34)
MCHC RBC-ENTMCNC: 34.1 GM/DL — SIGNIFICANT CHANGE UP (ref 32–36)
MCV RBC AUTO: 94.3 FL — SIGNIFICANT CHANGE UP (ref 80–100)
NRBC # BLD: 0 /100 WBCS — SIGNIFICANT CHANGE UP (ref 0–0)
PLATELET # BLD AUTO: 313 K/UL — SIGNIFICANT CHANGE UP (ref 150–400)
POTASSIUM SERPL-MCNC: 3.9 MMOL/L — SIGNIFICANT CHANGE UP (ref 3.5–5.3)
POTASSIUM SERPL-SCNC: 3.9 MMOL/L — SIGNIFICANT CHANGE UP (ref 3.5–5.3)
RBC # BLD: 4.04 M/UL — SIGNIFICANT CHANGE UP (ref 3.8–5.2)
RBC # FLD: 13.2 % — SIGNIFICANT CHANGE UP (ref 10.3–14.5)
SODIUM SERPL-SCNC: 138 MMOL/L — SIGNIFICANT CHANGE UP (ref 135–145)
WBC # BLD: 7.06 K/UL — SIGNIFICANT CHANGE UP (ref 3.8–10.5)
WBC # FLD AUTO: 7.06 K/UL — SIGNIFICANT CHANGE UP (ref 3.8–10.5)

## 2023-08-17 PROCEDURE — 99232 SBSQ HOSP IP/OBS MODERATE 35: CPT

## 2023-08-17 RX ORDER — DICLOFENAC SODIUM 30 MG/G
2 GEL TOPICAL
Refills: 0 | Status: DISCONTINUED | OUTPATIENT
Start: 2023-08-17 | End: 2023-08-22

## 2023-08-17 RX ADMIN — SIMETHICONE 80 MILLIGRAM(S): 80 TABLET, CHEWABLE ORAL at 12:07

## 2023-08-17 RX ADMIN — SIMETHICONE 80 MILLIGRAM(S): 80 TABLET, CHEWABLE ORAL at 05:54

## 2023-08-17 RX ADMIN — Medication 650 MILLIGRAM(S): at 09:45

## 2023-08-17 RX ADMIN — Medication 3 MILLIGRAM(S): at 20:35

## 2023-08-17 RX ADMIN — LISINOPRIL 40 MILLIGRAM(S): 2.5 TABLET ORAL at 05:55

## 2023-08-17 RX ADMIN — ENOXAPARIN SODIUM 40 MILLIGRAM(S): 100 INJECTION SUBCUTANEOUS at 18:03

## 2023-08-17 RX ADMIN — Medication 650 MILLIGRAM(S): at 08:45

## 2023-08-17 RX ADMIN — DICLOFENAC SODIUM 2 GRAM(S): 30 GEL TOPICAL at 18:03

## 2023-08-17 RX ADMIN — Medication 1 PACKET(S): at 05:54

## 2023-08-17 RX ADMIN — SIMETHICONE 80 MILLIGRAM(S): 80 TABLET, CHEWABLE ORAL at 20:35

## 2023-08-17 RX ADMIN — EZETIMIBE 10 MILLIGRAM(S): 10 TABLET ORAL at 20:35

## 2023-08-17 RX ADMIN — AMLODIPINE BESYLATE 5 MILLIGRAM(S): 2.5 TABLET ORAL at 18:03

## 2023-08-17 RX ADMIN — LIDOCAINE 2 PATCH: 4 CREAM TOPICAL at 00:33

## 2023-08-17 NOTE — PROGRESS NOTE ADULT - SUBJECTIVE AND OBJECTIVE BOX
Patient is a 83y old  Female who presents with a chief complaint of Debility due to small bowel obstruction s/p surgery and lysis of adhesion (16 Aug 2023 10:32)      Patient seen and examined at bedside. No events overnight but wasn't able to sleep much, not sure why but thinks it is because of the bed- has some back pain and neck pain now due to uncomfortable bed. Denies fever, chills, sob, chest pain, abd pain    ALLERGIES:  lactose (Unknown)  No Known Allergies    MEDICATIONS  (STANDING):  amLODIPine   Tablet 5 milliGRAM(s) Oral <User Schedule>  enoxaparin Injectable 40 milliGRAM(s) SubCutaneous every 24 hours  ezetimibe 10 milliGRAM(s) Oral at bedtime  lidocaine   4% Patch 1 Patch Transdermal <User Schedule>  lidocaine   4% Patch 2 Patch Transdermal <User Schedule>  lisinopril 40 milliGRAM(s) Oral daily  melatonin 3 milliGRAM(s) Oral at bedtime  psyllium Powder 1 Packet(s) Oral <User Schedule>  simethicone 80 milliGRAM(s) Chew four times a day    MEDICATIONS  (PRN):  acetaminophen     Tablet .. 650 milliGRAM(s) Oral every 6 hours PRN Mild Pain (1 - 3)  bisacodyl Suppository 10 milliGRAM(s) Rectal daily PRN Constipation  ondansetron    Tablet 4 milliGRAM(s) Oral every 6 hours PRN Nausea and/or Vomiting  traMADol 50 milliGRAM(s) Oral every 6 hours PRN Severe Pain (7 - 10)  traMADol 25 milliGRAM(s) Oral every 6 hours PRN Moderate Pain (4 - 6)    Vital Signs Last 24 Hrs  T(F): 98.6 (16 Aug 2023 19:31), Max: 98.6 (16 Aug 2023 19:31)  HR: 84 (17 Aug 2023 05:48) (83 - 92)  BP: 136/83 (17 Aug 2023 05:48) (113/67 - 136/83)  RR: 16 (17 Aug 2023 05:48) (16 - 16)  SpO2: 96% (17 Aug 2023 05:48) (95% - 96%)  I&O's Summary      PHYSICAL EXAM:  General: NAD, awake, alert, sitting up in bed  ENT: MMM, no tonsilar exudate  Neck: Supple, No JVD  Lungs: Clear to auscultation bilaterally, no wheezes. Good air entry bilaterally   Cardio: RRR, S1/S2, No murmurs  Abdomen: Soft, Nontender, Nondistended; Bowel sounds present  Extremities: No calf tenderness, No pitting edema    LABS:                        13.0   7.06  )-----------( 313      ( 17 Aug 2023 06:42 )             38.1     08-17    138  |  102  |  18  ----------------------------<  96  3.9   |  27  |  0.76    Ca    9.4      17 Aug 2023 06:42                08-07 Chol 176 mg/dL LDL -- HDL 99 mg/dL Trig 40 mg/dL                  Urinalysis Basic - ( 17 Aug 2023 06:42 )    Color: x / Appearance: x / SG: x / pH: x  Gluc: 96 mg/dL / Ketone: x  / Bili: x / Urobili: x   Blood: x / Protein: x / Nitrite: x   Leuk Esterase: x / RBC: x / WBC x   Sq Epi: x / Non Sq Epi: x / Bacteria: x        COVID-19 PCR: NotDetec (08-11-23 @ 05:04)      RADIOLOGY & ADDITIONAL TESTS:    Care Discussed with Consultants/Other Providers:

## 2023-08-17 NOTE — PROGRESS NOTE ADULT - SUBJECTIVE AND OBJECTIVE BOX
Subjective  Seen and examined with NP   Patient reports recurrence of mod intensity  back pain last night  Continues to have normal, bowel function normalized  Slept well  Abd cramping resolved  BP stabilized     ROS--No chest no nausea, or vomiting, no head ache  LBM 8/15    Therapy--Engaging in therapy.  Making sustained improvement with ambulatory distance     ICU Vital Signs Last 24 Hrs  T(C): 36.7 (15 Aug 2023 21:41), Max: 36.7 (15 Aug 2023 21:41)  T(F): 98 (15 Aug 2023 21:41), Max: 98 (15 Aug 2023 21:41)  HR: 81 (16 Aug 2023 05:10) (81 - 91)  BP: 112/77 (16 Aug 2023 05:10) (112/77 - 146/69)  RR: 16 (16 Aug 2023 05:10) (16 - 16)  SpO2: 96% (16 Aug 2023 05:10) (96% - 98%)  O2 Parameters below as of 15 Aug 2023 18:00  Patient On (Oxygen Delivery Method): room air      EXAM  Gen - Comfortable, no distress  HEENT - neck supple   Neck - Supple, No limited ROM  Pulm - Clear   Cardiovascular - RRR, S1S2  Chest - Normal heart sounds  Abdomen - Soft, non tender, +bowel sounds   Extremities - No Cyanosis, no clubbing, no edema, no calf tenderness    Neuro-  Alert and fully oriented     Cranial Nerves - CN 2-12 intact.     Motor -                     LEFT    UE - 4+/5                    RIGHT UE -  4+/5                    LEFT    LE - 4-/5                    RIGHT LE - 4-/5        Sensory - Intact  to LT      Reflexes - DTR 2+      Coordination - No dysmetria     Tone - normal  Psychiatric - Mood stable, Affect WNL  Skin: Lap site x 3 to left side of central abdomen     RECENT LABS/IMAGING             12.4   6.45  )-----------( 271      ( 14 Aug 2023 07:03 )             36.3     08-14    137  |  101  |  15  ----------------------------<  86  3.7   |  28  |  0.60    Ca    9.1      14 Aug 2023 07:03  LFT, renal fxn WNL on 8/11    Urinalysis Basic - ( 14 Aug 2023 07:03 )  Color: x / Appearance: x / SG: x / pH: x  Gluc: 86 mg/dL / Ketone: x  / Bili: x / Urobili: x   Blood: x / Protein: x / Nitrite: x   Leuk Esterase: x / RBC: x / WBC x   Sq Epi: x / Non Sq Epi: x / Bacteria: x      MEDICATIONS  (STANDING):  amLODIPine   Tablet 5 milliGRAM(s) Oral <User Schedule>  enoxaparin Injectable 40 milliGRAM(s) SubCutaneous every 24 hours  lidocaine   4% Patch 2 Patch Transdermal <User Schedule>  lidocaine   4% Patch 1 Patch Transdermal once  lisinopril 20 milliGRAM(s) Oral daily  melatonin 3 milliGRAM(s) Oral at bedtime  psyllium Powder 1 Packet(s) Oral <User Schedule>  simethicone 80 milliGRAM(s) Chew four times a day    MEDICATIONS  (PRN):  acetaminophen     Tablet .. 650 milliGRAM(s) Oral every 6 hours PRN Mild Pain (1 - 3)  bisacodyl Suppository 10 milliGRAM(s) Rectal daily PRN Constipation  ondansetron    Tablet 4 milliGRAM(s) Oral every 6 hours PRN Nausea and/or Vomiting  traMADol 50 milliGRAM(s) Oral every 6 hours PRN Severe Pain (7 - 10)  traMADol 25 milliGRAM(s) Oral every 6 hours PRN Moderate Pain (4 - 6)    Subjective  Seen and examined with NP   Patient reports recurrence of mod intensity  back pain last night  Continues to have normal, bowel function normalized  Slept well  Abd cramping resolved  BP stabilized     ROS--No chest no nausea, or vomiting, no head ache  LBM 8/15    Therapy--Engaging in therapy.  Making sustained improvement with ambulatory distance     ICU Vital Signs Last 24 Hrs  T(C): 37 (16 Aug 2023 19:31), Max: 37 (16 Aug 2023 19:31)  T(F): 98.6 (16 Aug 2023 19:31), Max: 98.6 (16 Aug 2023 19:31)  HR: 84 (17 Aug 2023 05:48) (83 - 92)  BP: 136/83 (17 Aug 2023 05:48) (113/67 - 136/83)  RR: 16 (17 Aug 2023 05:48) (16 - 16)  SpO2: 96% (17 Aug 2023 05:48) (95% - 96%)  O2 Parameters below as of 17 Aug 2023 05:48  Patient On (Oxygen Delivery Method): room air      EXAM  Gen - mild distress due to back pain   HEENT - neck supple   Pulm - Clear   Cardiovascular - RRR, S1S2  Chest - Normal heart sounds  Abdomen - Soft, non tender, +bowel sounds   Extremities - No Cyanosis, no clubbing, no edema, no calf tenderness    Neuro-  Alert and fully oriented     Cranial Nerves - CN 2-12 intact.     Motor -                     LEFT    UE - 4+/5                    RIGHT UE -  4+/5                    LEFT    LE - 4-/5                    RIGHT LE - 4-/5        Sensory - Intact  to LT      Reflexes - DTR 2+      Coordination - No dysmetria     Tone - normal  Psychiatric - Mood stable, Affect WNL  Skin:f central abdomen surgical scar, unremarkable    RECENT LABS/IMAGING                        13.0   7.06  )-----------( 313      ( 17 Aug 2023 06:42 )             38.1     08-17    138  |  102  |  18  ----------------------------<  96  3.9   |  27  |  0.76    Ca    9.4      17 Aug 2023 06:42    Urinalysis Basic - ( 17 Aug 2023 06:42 )    Color: x / Appearance: x / SG: x / pH: x  Gluc: 96 mg/dL / Ketone: x  / Bili: x / Urobili: x   Blood: x / Protein: x / Nitrite: x   Leuk Esterase: x / RBC: x / WBC x   Sq Epi: x / Non Sq Epi: x / Bacteria: x    MEDICATIONS  (STANDING):  amLODIPine   Tablet 5 milliGRAM(s) Oral <User Schedule>  enoxaparin Injectable 40 milliGRAM(s) SubCutaneous every 24 hours  ezetimibe 10 milliGRAM(s) Oral at bedtime  lidocaine   4% Patch 1 Patch Transdermal <User Schedule>  lidocaine   4% Patch 2 Patch Transdermal <User Schedule>  lisinopril 40 milliGRAM(s) Oral daily  melatonin 3 milliGRAM(s) Oral at bedtime  psyllium Powder 1 Packet(s) Oral <User Schedule>  simethicone 80 milliGRAM(s) Chew four times a day    MEDICATIONS  (PRN):  acetaminophen     Tablet .. 650 milliGRAM(s) Oral every 6 hours PRN Mild Pain (1 - 3)  bisacodyl Suppository 10 milliGRAM(s) Rectal daily PRN Constipation  ondansetron    Tablet 4 milliGRAM(s) Oral every 6 hours PRN Nausea and/or Vomiting  traMADol 50 milliGRAM(s) Oral every 6 hours PRN Severe Pain (7 - 10)  traMADol 25 milliGRAM(s) Oral every 6 hours PRN Moderate Pain (4 - 6)

## 2023-08-17 NOTE — PROGRESS NOTE ADULT - ASSESSMENT
Assessment/Plan:  KD CONNELLY is a 83y with PMH of HTN and HLD presented to [facility] on 8/6 for abdominal pain, found to have ischemic small bowel and SBO, s/p laparotomy with HAROON on 8/6 with Dr. Chisholm. Patient now admitted for a multidisciplinary rehab program. 08-10-23 @ 13:01    * Bowel function normalized  * BP controlled     # Debility,due to Small Bowel Obstruction, (small bowel ischemia)  - s/p diagnostic laparoscopy with laparotomy with Lysis of adhesion  for SBO on 8/6 with Dr Chisholm  - Diet advanced, now tolerating regular diet  - [facility] comprehensive rehab program of PT/OT/SLP - 3 hours a day, 5 days a week. P&O as needed    HTN (elevated bp)   - HCTZ and lisinopril (home meds)  - Continue norvasc 5mg BID  --Lisinopril dose increased 8/15  --Continue to hold HCTZ    *Altered bowel function 8/14--Abd xray--normal gas pattern-bowel function normalized    HLD  - Continue Zetia 10mg qd (Pharmacy agreed to restart it)    Pain  - Continue Tylenol and tramadol PRN    Sleep  - Continue Melatonin PRN     GI / Bowel  Continue Metamucil     # Left foot pain---Podiatry recs for Left foot pain appreciated   / Bladder--voiding appropriately     Skin / Pressure injury  - Skin assessment on admission performed : abdominal incision with surgiglue  - Turn q2 hours in bed while awake, air mattress  - nursing to monitor skin q Shift  - soft heel protectors  - skin barrier cream PRN    Diet/Dysphagia:  - Diet Consistency: Regular    DVT prophylaxis:   -Continue  Lovenox  ---------------  # Labs--8/14--results reviewed, normal Hb, Renal and liver function  ---------------  NOK--Son--Mr Susan Mallory --daughter in law/Staff of [facility],  [phone]     Liaison with family  8/14--I called on ph and d/w patient's son Mr Davis (ph as in EMR) and daughter in law Ms Mallory --ph as stated  She report that patient is on f/u with The same cardiology group her ar [facility] and they would appreciate a f/u review as she was due one prior to her hosp admission  Reports that patient has lumbar stenosis with previous BULL, due next one today 8/14, (deferred due to her recent surgery), --Dr Mora     Hx of diverticular disease, bowel function maintained with metamucil  Patient lives alone, family is currently trying to get her a helper  I gave update on patient's functional progress, Abd xray, recent altered bowel movements and our management plan, therapy update, Podiatry review for left foot pain and our management for her back and b/l knee pain with lidocaine patch  I told her that we would d/c miralax and commence metamucil  She was happy with the update  ---------------  IDT conference on 8/15  TDD: 8/22 to home   Barriers: Lower back pain and L foot pain  Social Work: Lives alone in  with 1STE and 1HR. Has supportive family. Looking to private hire.   DME: Needs RW  OT: [facility] for ADLs and transfers.   PT: Supervision for transfers. Ambulated 150 ft wit RW supervision.  SLP: --.  ---------------  Outpatient Follow-up:  Skip Godwin  Internal Medicine  [address] [place] [address]  Phone: [phone]  Fax: [phone]  Follow Up Time:     Simon Chisholm  Surgery  [address] [place] [address], [address]  [place] [address]  Phone: [phone]  Fax: [phone]  Follow Up Time:    [facility]  Dr Pat/Paulina hernandez   --------------- Assessment/Plan:  KD CONNELLY is a 83y with PMH of HTN and HLD presented to [facility] on 8/6 for abdominal pain, found to have ischemic small bowel and SBO, s/p laparotomy with HAROON on 8/6 with Dr. Chisholm. Patient now admitted for a multidisciplinary rehab program. 08-10-23 @ 13:01    * Back pain recurrent--continue lidocaine patch, add ice packs prn  * Lab results--unremarkable, d/w patient     # Debility,due to Small Bowel Obstruction, (small bowel ischemia)  - s/p diagnostic laparoscopy with laparotomy with Lysis of adhesion  for SBO on 8/6 with Dr Chisholm  - Diet advanced, now tolerating regular diet  - [facility] comprehensive rehab program of PT/OT/SLP - 3 hours a day, 5 days a week. P&O as needed    * Chronic back pain--continue lidocaine patch, add ice packs prn   s/p previous BULL, will f/u with her pain mgt physician     HTN (elevated bp)   - HCTZ and lisinopril (home meds)  - Continue norvasc 5mg BID  --Lisinopril dose increased 8/15  --Continue to hold HCTZ    *Altered bowel function 8/14--Abd xray--normal gas pattern-bowel function normalized    HLD  - Continue Zetia 10mg qd (Pharmacy agreed to restart it)    Pain  - Continue Tylenol and tramadol PRN    Sleep  - Continue Melatonin PRN     GI / Bowel  Continue Metamucil     # Left foot pain---Podiatry recs for Left foot pain appreciated   / Bladder--voiding appropriately     Skin / Pressure injury  - Skin assessment on admission performed : abdominal incision with surgiglue  - Turn q2 hours in bed while awake, air mattress  - nursing to monitor skin q Shift  - soft heel protectors  - skin barrier cream PRN    Diet/Dysphagia:  - Diet Consistency: Regular    DVT prophylaxis:   -Continue  Lovenox  ---------------  # Labs--8/17--results reviewed, normal Hb, Renal and liver function  ---------------  NOK--Son--Mr Susan Mallory --daughter in law/Staff of [facility],  [phone]     Liaison with family  8/14--I called on ph and d/w patient's son Mr Davis (ph as in EMR) and daughter in law Ms Mallory -- as stated  She report that patient is on f/u with The same cardiology group her ar [facility] and they would appreciate a f/u review as she was due one prior to her hosp admission  Reports that patient has lumbar stenosis with previous BULL, due next one today 8/14, (deferred due to her recent surgery), --Dr Mora     Hx of diverticular disease, bowel function maintained with metamucil  Patient lives alone, family is currently trying to get her a helper  I gave update on patient's functional progress, Abd xray, recent altered bowel movements and our management plan, therapy update, Podiatry review for left foot pain and our management for her back and b/l knee pain with lidocaine patch  I told her that we would d/c miralax and commence metamucil  She was happy with the update  ---------------  IDT conference on 8/15  TDD: 8/22 to home   Barriers: Lower back pain and L foot pain  Social Work: Lives alone in  with 1STE and 1HR. Has supportive family. Looking to private hire.   DME: Needs RW  OT: [facility] for ADLs and transfers.   PT: Supervision for transfers. Ambulated 150 ft wit RW supervision.  SLP: --.N/A   ---------------  Outpatient Follow-up:  Skip Godwin  Internal Medicine  [address] [place] [address]  Phone: [phone]  Fax: [phone]  Follow Up Time:     Simon Chisholm  Surgery  [address] [place] [address], [address]  [place] [address]  Phone: [phone]  Fax: [phone]  Follow Up Time:    [facility]  Dr Pat/Paulina hernandez   ---------------

## 2023-08-17 NOTE — PROGRESS NOTE ADULT - ASSESSMENT
83F with HTN, HLD, recent surgery (HAROON) for SBO, now in acute rehab    #Debility  #Ischemic small bowel due to SBO s/p HAROON  -pain control  -PT/OT    #Essential HTN  -c/w Norvasc  -Lisinopril 40mg daily  -BP acceptable   -would hold off on HCTZ indefinitely, risks>benefits in this age group    #HLD  -c/w home Zetia    #DVT ppx:   -Lovenox

## 2023-08-18 PROCEDURE — 99232 SBSQ HOSP IP/OBS MODERATE 35: CPT

## 2023-08-18 RX ORDER — TRAMADOL HYDROCHLORIDE 50 MG/1
25 TABLET ORAL EVERY 6 HOURS
Refills: 0 | Status: DISCONTINUED | OUTPATIENT
Start: 2023-08-18 | End: 2023-08-22

## 2023-08-18 RX ORDER — TRAMADOL HYDROCHLORIDE 50 MG/1
50 TABLET ORAL EVERY 6 HOURS
Refills: 0 | Status: DISCONTINUED | OUTPATIENT
Start: 2023-08-18 | End: 2023-08-22

## 2023-08-18 RX ADMIN — LIDOCAINE 1 PATCH: 4 CREAM TOPICAL at 07:18

## 2023-08-18 RX ADMIN — SIMETHICONE 80 MILLIGRAM(S): 80 TABLET, CHEWABLE ORAL at 06:01

## 2023-08-18 RX ADMIN — EZETIMIBE 10 MILLIGRAM(S): 10 TABLET ORAL at 22:01

## 2023-08-18 RX ADMIN — SIMETHICONE 80 MILLIGRAM(S): 80 TABLET, CHEWABLE ORAL at 11:49

## 2023-08-18 RX ADMIN — SIMETHICONE 80 MILLIGRAM(S): 80 TABLET, CHEWABLE ORAL at 17:16

## 2023-08-18 RX ADMIN — AMLODIPINE BESYLATE 5 MILLIGRAM(S): 2.5 TABLET ORAL at 06:02

## 2023-08-18 RX ADMIN — DICLOFENAC SODIUM 2 GRAM(S): 30 GEL TOPICAL at 06:02

## 2023-08-18 RX ADMIN — LIDOCAINE 2 PATCH: 4 CREAM TOPICAL at 07:17

## 2023-08-18 RX ADMIN — SIMETHICONE 80 MILLIGRAM(S): 80 TABLET, CHEWABLE ORAL at 23:07

## 2023-08-18 RX ADMIN — LISINOPRIL 40 MILLIGRAM(S): 2.5 TABLET ORAL at 06:01

## 2023-08-18 RX ADMIN — AMLODIPINE BESYLATE 5 MILLIGRAM(S): 2.5 TABLET ORAL at 17:16

## 2023-08-18 RX ADMIN — Medication 3 MILLIGRAM(S): at 22:00

## 2023-08-18 RX ADMIN — Medication 1 PACKET(S): at 06:02

## 2023-08-18 RX ADMIN — ENOXAPARIN SODIUM 40 MILLIGRAM(S): 100 INJECTION SUBCUTANEOUS at 17:16

## 2023-08-18 RX ADMIN — DICLOFENAC SODIUM 2 GRAM(S): 30 GEL TOPICAL at 17:16

## 2023-08-18 NOTE — PROGRESS NOTE ADULT - SUBJECTIVE AND OBJECTIVE BOX
Patient is a 83y old  Female who presents with a chief complaint of Debility due to small bowel obstruction s/p surgery and lysis of adhesion (18 Aug 2023 11:46)    Patient seen and examined at bedside. No acute overnight events.     ALLERGIES:  lactose (Unknown)  No Known Allergies    MEDICATIONS  (STANDING):  amLODIPine   Tablet 5 milliGRAM(s) Oral <User Schedule>  diclofenac sodium 1% Gel 2 Gram(s) Topical two times a day  enoxaparin Injectable 40 milliGRAM(s) SubCutaneous every 24 hours  ezetimibe 10 milliGRAM(s) Oral at bedtime  lidocaine   4% Patch 1 Patch Transdermal <User Schedule>  lidocaine   4% Patch 2 Patch Transdermal <User Schedule>  lisinopril 40 milliGRAM(s) Oral daily  melatonin 3 milliGRAM(s) Oral at bedtime  psyllium Powder 1 Packet(s) Oral <User Schedule>  simethicone 80 milliGRAM(s) Chew four times a day    MEDICATIONS  (PRN):  acetaminophen     Tablet .. 650 milliGRAM(s) Oral every 6 hours PRN Mild Pain (1 - 3)  bisacodyl Suppository 10 milliGRAM(s) Rectal daily PRN Constipation  ondansetron    Tablet 4 milliGRAM(s) Oral every 6 hours PRN Nausea and/or Vomiting  traMADol 50 milliGRAM(s) Oral every 6 hours PRN Severe Pain (7 - 10)  traMADol 25 milliGRAM(s) Oral every 6 hours PRN Moderate Pain (4 - 6)    Vital Signs Last 24 Hrs  T(F): 98.2 (18 Aug 2023 07:16), Max: 98.5 (17 Aug 2023 20:30)  HR: 84 (18 Aug 2023 07:16) (83 - 89)  BP: 159/76 (18 Aug 2023 07:16) (124/67 - 159/76)  RR: 16 (18 Aug 2023 07:16) (16 - 16)  SpO2: 95% (18 Aug 2023 07:16) (95% - 96%)      PHYSICAL EXAM:  General: NAD, awake, alert  ENT: Moist mucous membranes  Neck: Supple, no JVD  Lungs: Clear to auscultation bilaterally, no wheezes  Cardio: S1 S2, regular rate and rhythm  Abdomen: Soft, nontender, nondistended, bowel sounds present  Extremities: No calf tenderness, no pitting edema    LABS:                        13.0   7.06  )-----------( 313      ( 17 Aug 2023 06:42 )             38.1       08-17    138  |  102  |  18  ----------------------------<  96  3.9   |  27  |  0.76    Ca    9.4      17 Aug 2023 06:42                  08-07 Chol 176 mg/dL LDL -- HDL 99 mg/dL Trig 40 mg/dL                  Urinalysis Basic - ( 17 Aug 2023 06:42 )    Color: x / Appearance: x / SG: x / pH: x  Gluc: 96 mg/dL / Ketone: x  / Bili: x / Urobili: x   Blood: x / Protein: x / Nitrite: x   Leuk Esterase: x / RBC: x / WBC x   Sq Epi: x / Non Sq Epi: x / Bacteria: x        COVID-19 PCR: NotDetec (08-11-23 @ 05:04)      RADIOLOGY & ADDITIONAL TESTS:     Care Discussed with Consultants/Other Providers: PM&R

## 2023-08-18 NOTE — PROGRESS NOTE ADULT - SUBJECTIVE AND OBJECTIVE BOX
Subjective  Patient seen and examined at bedside this AM.   Admits to sleeping well.  Back pain improved with Diclofenac gel.  Last BM on 8/17, per patient.   No other complaints at this time.    Denies headache, dizziness, visual changes, chest pain, SOB/GODWIN, abdominal pain, nausea, vomiting, diarrhea, dysuria, numbness or tingling.     Therapy--Engaging in therapy.  Making sustained improvement with ambulatory distance     Vital Signs Last 24 Hrs  T(C): 36.8 (18 Aug 2023 07:16), Max: 36.9 (17 Aug 2023 20:30)  T(F): 98.2 (18 Aug 2023 07:16), Max: 98.5 (17 Aug 2023 20:30)  HR: 84 (18 Aug 2023 07:16) (83 - 89)  BP: 159/76 (18 Aug 2023 07:16) (124/67 - 159/76)  BP(mean): --  RR: 16 (18 Aug 2023 07:16) (16 - 16)  SpO2: 95% (18 Aug 2023 07:16) (95% - 96%)      EXAM  Gen - mild distress due to back pain   HEENT - neck supple   Pulm - Clear   Cardiovascular - RRR, S1S2  Chest - Normal heart sounds  Abdomen - Soft, non tender, +bowel sounds   Extremities - No Cyanosis, no clubbing, no edema, no calf tenderness    Neuro-  Alert and fully oriented     Cranial Nerves - CN 2-12 intact.     Motor -                     LEFT    UE - 4+/5                    RIGHT UE -  4+/5                    LEFT    LE - 4-/5                    RIGHT LE - 4-/5        Sensory - Intact  to LT      Reflexes - DTR 2+      Coordination - No dysmetria     Tone - normal  Psychiatric - Mood stable, Affect WNL  Skin:f central abdomen surgical scar, unremarkable    RECENT LABS/IMAGING                        13.0   7.06  )-----------( 313      ( 17 Aug 2023 06:42 )             38.1     08-17    138  |  102  |  18  ----------------------------<  96  3.9   |  27  |  0.76    Ca    9.4      17 Aug 2023 06:42    Urinalysis Basic - ( 17 Aug 2023 06:42 )    Color: x / Appearance: x / SG: x / pH: x  Gluc: 96 mg/dL / Ketone: x  / Bili: x / Urobili: x   Blood: x / Protein: x / Nitrite: x   Leuk Esterase: x / RBC: x / WBC x   Sq Epi: x / Non Sq Epi: x / Bacteria: x    MEDICATIONS  (STANDING):  amLODIPine   Tablet 5 milliGRAM(s) Oral <User Schedule>  enoxaparin Injectable 40 milliGRAM(s) SubCutaneous every 24 hours  ezetimibe 10 milliGRAM(s) Oral at bedtime  lidocaine   4% Patch 1 Patch Transdermal <User Schedule>  lidocaine   4% Patch 2 Patch Transdermal <User Schedule>  lisinopril 40 milliGRAM(s) Oral daily  melatonin 3 milliGRAM(s) Oral at bedtime  psyllium Powder 1 Packet(s) Oral <User Schedule>  simethicone 80 milliGRAM(s) Chew four times a day    MEDICATIONS  (PRN):  acetaminophen     Tablet .. 650 milliGRAM(s) Oral every 6 hours PRN Mild Pain (1 - 3)  bisacodyl Suppository 10 milliGRAM(s) Rectal daily PRN Constipation  ondansetron    Tablet 4 milliGRAM(s) Oral every 6 hours PRN Nausea and/or Vomiting  traMADol 50 milliGRAM(s) Oral every 6 hours PRN Severe Pain (7 - 10)  traMADol 25 milliGRAM(s) Oral every 6 hours PRN Moderate Pain (4 - 6)

## 2023-08-18 NOTE — PROGRESS NOTE ADULT - ASSESSMENT
Assessment/Plan:  KD CONNELLY is a 83y with PMH of HTN and HLD presented to [facility] on 8/6 for abdominal pain, found to have ischemic small bowel and SBO, s/p laparotomy with HAROON on 8/6 with Dr. Chisholm. Patient now admitted for a multidisciplinary rehab program. 08-10-23 @ 13:01    * Back pain improved on Diclofenac gel  * Continue to monitor pain  * Continue to monitor rehab progress     # Debility,due to Small Bowel Obstruction, (small bowel ischemia)  - s/p diagnostic laparoscopy with laparotomy with Lysis of adhesion  for SBO on 8/6 with Dr Chisholm  - Diet advanced, now tolerating regular diet  - Continue comprehensive rehab program of PT/OT/SLP - 3 hours a day, 5 days a week. P&O as needed    HTN (elevated bp)   - HCTZ and lisinopril (home meds)  - Continue norvasc 5mg BID  --Lisinopril dose increased 8/15  --Continue to hold HCTZ    HLD  - Continue Zetia 10mg qd (Pharmacy agreed to restart it)    Chronic Pain  - Lidocaine patch   - Cold compress  - Diclofenac gel   - Continue Tylenol and tramadol PRN  - Follow up with outpatient pain management    Sleep  - Continue Melatonin PRN     GI / Bowel  - 8/14--Abd xray--normal gas pattern-bowel function normalized  - Continue Metamucil     # Left foot pain---Podiatry recs for Left foot pain appreciated   / Bladder--voiding appropriately     Skin / Pressure injury  - Skin assessment on admission performed : abdominal incision with surgiglue  - Turn q2 hours in bed while awake, air mattress  - nursing to monitor skin q Shift  - soft heel protectors  - skin barrier cream PRN    Diet/Dysphagia:  - Diet Consistency: Regular    DVT prophylaxis:   -Continue  Lovenox  ---------------  # Labs--8/17--results reviewed, normal Hb, Renal and liver function  ---------------  NOK--Son--Mr Susan Mallory --daughter in law/Staff of [facility],  [phone]     Liaison with family  8/14--I called on ph and d/w patient's son Mr Davis (ph as in EMR) and daughter in law Ms Mallory --ph as stated  She report that patient is on f/u with The same cardiology group her ar [facility] and they would appreciate a f/u review as she was due one prior to her hosp admission  Reports that patient has lumbar stenosis with previous BULL, due next one today 8/14, (deferred due to her recent surgery), --Dr Mora     Hx of diverticular disease, bowel function maintained with metamucil  Patient lives alone, family is currently trying to get her a helper  I gave update on patient's functional progress, Abd xray, recent altered bowel movements and our management plan, therapy update, Podiatry review for left foot pain and our management for her back and b/l knee pain with lidocaine patch  I told her that we would d/c miralax and commence metamucil  She was happy with the update  ---------------  IDT conference on 8/15  TDD: 8/22 to home   Barriers: Lower back pain and L foot pain  Social Work: Lives alone in  with 1STE and 1HR. Has supportive family. Looking to private hire.   DME: Needs RW  OT: [facility] for ADLs and transfers.   PT: Supervision for transfers. Ambulated 150 ft wit RW supervision.  SLP: --.N/A   ---------------  Outpatient Follow-up:  Skip Godwin  Internal Medicine  [address] [place] [address]  Phone: [phone]  Fax: [phone]  Follow Up Time:     Simon Chisholm  Surgery  [address] [place] [address], [address] [place] [id number]  Phone: [phone]  Fax: [phone]  Follow Up Time:    [facility]  Dr Pat/Paulina hernandez   ---------------

## 2023-08-18 NOTE — PROGRESS NOTE ADULT - ASSESSMENT
83F with HTN, HLD, recent surgery (HAROON) for SBO, now in acute rehab.    #Debility  #Ischemic small bowel due to SBO s/p HAROON  -pain control  -PT/OT    #Essential HTN  -BP acceptable   -c/w Norvasc 5 mg BID, Lisinopril 40 mg daily  -would hold off on HCTZ indefinitely, risks>benefits in this age group    #HLD  -c/w home Zetia    #DVT ppx  -Lovenox

## 2023-08-19 PROCEDURE — 99232 SBSQ HOSP IP/OBS MODERATE 35: CPT

## 2023-08-19 RX ADMIN — Medication 3 MILLIGRAM(S): at 21:37

## 2023-08-19 RX ADMIN — SIMETHICONE 80 MILLIGRAM(S): 80 TABLET, CHEWABLE ORAL at 21:37

## 2023-08-19 RX ADMIN — AMLODIPINE BESYLATE 5 MILLIGRAM(S): 2.5 TABLET ORAL at 17:12

## 2023-08-19 RX ADMIN — SIMETHICONE 80 MILLIGRAM(S): 80 TABLET, CHEWABLE ORAL at 06:09

## 2023-08-19 RX ADMIN — AMLODIPINE BESYLATE 5 MILLIGRAM(S): 2.5 TABLET ORAL at 06:08

## 2023-08-19 RX ADMIN — LIDOCAINE 2 PATCH: 4 CREAM TOPICAL at 19:45

## 2023-08-19 RX ADMIN — LIDOCAINE 1 PATCH: 4 CREAM TOPICAL at 07:54

## 2023-08-19 RX ADMIN — DICLOFENAC SODIUM 2 GRAM(S): 30 GEL TOPICAL at 06:16

## 2023-08-19 RX ADMIN — DICLOFENAC SODIUM 2 GRAM(S): 30 GEL TOPICAL at 17:12

## 2023-08-19 RX ADMIN — LIDOCAINE 1 PATCH: 4 CREAM TOPICAL at 19:45

## 2023-08-19 RX ADMIN — LIDOCAINE 2 PATCH: 4 CREAM TOPICAL at 07:54

## 2023-08-19 RX ADMIN — SIMETHICONE 80 MILLIGRAM(S): 80 TABLET, CHEWABLE ORAL at 17:11

## 2023-08-19 RX ADMIN — EZETIMIBE 10 MILLIGRAM(S): 10 TABLET ORAL at 21:37

## 2023-08-19 RX ADMIN — LISINOPRIL 40 MILLIGRAM(S): 2.5 TABLET ORAL at 06:08

## 2023-08-19 RX ADMIN — Medication 1 PACKET(S): at 07:54

## 2023-08-19 RX ADMIN — ENOXAPARIN SODIUM 40 MILLIGRAM(S): 100 INJECTION SUBCUTANEOUS at 17:12

## 2023-08-19 RX ADMIN — SIMETHICONE 80 MILLIGRAM(S): 80 TABLET, CHEWABLE ORAL at 11:47

## 2023-08-19 NOTE — PROGRESS NOTE ADULT - SUBJECTIVE AND OBJECTIVE BOX
Cc: Weakness    HPI: Patient with no new medical issues today.  Pain controlled, no chest pain, no N/V, no Fevers/Chills. No other new ROS  Has been tolerating rehabilitation program.    acetaminophen     Tablet .. 650 milliGRAM(s) Oral every 6 hours PRN  amLODIPine   Tablet 5 milliGRAM(s) Oral <User Schedule>  bisacodyl Suppository 10 milliGRAM(s) Rectal daily PRN  diclofenac sodium 1% Gel 2 Gram(s) Topical two times a day  enoxaparin Injectable 40 milliGRAM(s) SubCutaneous every 24 hours  ezetimibe 10 milliGRAM(s) Oral at bedtime  lidocaine   4% Patch 2 Patch Transdermal <User Schedule>  lidocaine   4% Patch 1 Patch Transdermal <User Schedule>  lisinopril 40 milliGRAM(s) Oral daily  melatonin 3 milliGRAM(s) Oral at bedtime  ondansetron    Tablet 4 milliGRAM(s) Oral every 6 hours PRN  psyllium Powder 1 Packet(s) Oral <User Schedule>  simethicone 80 milliGRAM(s) Chew four times a day  traMADol 25 milliGRAM(s) Oral every 6 hours PRN  traMADol 50 milliGRAM(s) Oral every 6 hours PRN    T(C): 36.6 (08-19-23 @ 07:48), Max: 36.7 (08-18-23 @ 21:55)  HR: 90 (08-19-23 @ 07:48) (74 - 90)  BP: 130/68 (08-19-23 @ 07:48) (116/60 - 151/73)  RR: 16 (08-19-23 @ 07:48) (16 - 17)  SpO2: 96% (08-19-23 @ 07:48) (96% - 97%)    In NAD  HEENT- EOMI  Heart- Well Perfused  Lungs- No resp distress, no use of accessory resp muscles  Abd- + BS, NT  Ext- No calf pain  Neuro- Exam unchanged  Psych- Affect wnl    Imp: Patient with diagnosis of debility admitted for comprehensive acute rehabilitation.    Plan:  - Continue therapies  - DVT prophylaxis- Lovenox  - Skin- Turn q2h, check skin daily  - Continue current medications; patient medically stable.   - Patient is stable to continue current rehabilitation program- requires active and ongoing therapeutic interventions of multiple disciplines and can tolerate 3h/d of therapies. Can actively participate and benefit from an intensive rehabilitation program. Requires supervision of a rehabilitation physician and a coordinated interdisciplinary approach to providing rehabilitation.

## 2023-08-19 NOTE — PROGRESS NOTE ADULT - SUBJECTIVE AND OBJECTIVE BOX
Patient is a 83y old  Female who presents with a chief complaint of Debility due to small bowel obstruction s/p surgery and lysis of adhesion (19 Aug 2023 08:07)    Patient seen and examined at bedside. No acute overnight events.     ALLERGIES:  lactose (Unknown)  No Known Allergies    MEDICATIONS  (STANDING):  amLODIPine   Tablet 5 milliGRAM(s) Oral <User Schedule>  diclofenac sodium 1% Gel 2 Gram(s) Topical two times a day  enoxaparin Injectable 40 milliGRAM(s) SubCutaneous every 24 hours  ezetimibe 10 milliGRAM(s) Oral at bedtime  lidocaine   4% Patch 1 Patch Transdermal <User Schedule>  lidocaine   4% Patch 2 Patch Transdermal <User Schedule>  lisinopril 40 milliGRAM(s) Oral daily  melatonin 3 milliGRAM(s) Oral at bedtime  psyllium Powder 1 Packet(s) Oral <User Schedule>  simethicone 80 milliGRAM(s) Chew four times a day    MEDICATIONS  (PRN):  acetaminophen     Tablet .. 650 milliGRAM(s) Oral every 6 hours PRN Mild Pain (1 - 3)  bisacodyl Suppository 10 milliGRAM(s) Rectal daily PRN Constipation  ondansetron    Tablet 4 milliGRAM(s) Oral every 6 hours PRN Nausea and/or Vomiting  traMADol 50 milliGRAM(s) Oral every 6 hours PRN Severe Pain (7 - 10)  traMADol 25 milliGRAM(s) Oral every 6 hours PRN Moderate Pain (4 - 6)    Vital Signs Last 24 Hrs  T(F): 97.8 (19 Aug 2023 07:48), Max: 98.1 (18 Aug 2023 21:55)  HR: 90 (19 Aug 2023 07:48) (74 - 90)  BP: 130/68 (19 Aug 2023 07:48) (116/60 - 151/73)  RR: 16 (19 Aug 2023 07:48) (16 - 17)  SpO2: 96% (19 Aug 2023 07:48) (96% - 97%)    PHYSICAL EXAM:  General: NAD, awake, alert  ENT: Moist mucous membranes  Neck: Supple, no JVD  Lungs: Clear to auscultation bilaterally, no wheezes  Cardio: S1 S2, regular rate and rhythm  Abdomen: Soft, nontender, nondistended, bowel sounds present  Extremities: No calf tenderness, no pitting edema    LABS:                        13.0   7.06  )-----------( 313      ( 17 Aug 2023 06:42 )             38.1       08-17    138  |  102  |  18  ----------------------------<  96  3.9   |  27  |  0.76    Ca    9.4      17 Aug 2023 06:42                  08-07 Chol 176 mg/dL LDL -- HDL 99 mg/dL Trig 40 mg/dL                      COVID-19 PCR: Kary (08-11-23 @ 05:04)      RADIOLOGY & ADDITIONAL TESTS:     Care Discussed with Consultants/Other Providers: PM&R

## 2023-08-20 PROCEDURE — 99232 SBSQ HOSP IP/OBS MODERATE 35: CPT

## 2023-08-20 RX ADMIN — LIDOCAINE 2 PATCH: 4 CREAM TOPICAL at 20:54

## 2023-08-20 RX ADMIN — AMLODIPINE BESYLATE 5 MILLIGRAM(S): 2.5 TABLET ORAL at 05:08

## 2023-08-20 RX ADMIN — ENOXAPARIN SODIUM 40 MILLIGRAM(S): 100 INJECTION SUBCUTANEOUS at 17:33

## 2023-08-20 RX ADMIN — LISINOPRIL 40 MILLIGRAM(S): 2.5 TABLET ORAL at 05:08

## 2023-08-20 RX ADMIN — LIDOCAINE 1 PATCH: 4 CREAM TOPICAL at 20:44

## 2023-08-20 RX ADMIN — LIDOCAINE 1 PATCH: 4 CREAM TOPICAL at 18:47

## 2023-08-20 RX ADMIN — SIMETHICONE 80 MILLIGRAM(S): 80 TABLET, CHEWABLE ORAL at 12:51

## 2023-08-20 RX ADMIN — LIDOCAINE 2 PATCH: 4 CREAM TOPICAL at 10:29

## 2023-08-20 RX ADMIN — DICLOFENAC SODIUM 2 GRAM(S): 30 GEL TOPICAL at 05:07

## 2023-08-20 RX ADMIN — LIDOCAINE 1 PATCH: 4 CREAM TOPICAL at 20:54

## 2023-08-20 RX ADMIN — EZETIMIBE 10 MILLIGRAM(S): 10 TABLET ORAL at 21:00

## 2023-08-20 RX ADMIN — SIMETHICONE 80 MILLIGRAM(S): 80 TABLET, CHEWABLE ORAL at 05:09

## 2023-08-20 RX ADMIN — LIDOCAINE 1 PATCH: 4 CREAM TOPICAL at 10:30

## 2023-08-20 RX ADMIN — SIMETHICONE 80 MILLIGRAM(S): 80 TABLET, CHEWABLE ORAL at 21:00

## 2023-08-20 RX ADMIN — Medication 3 MILLIGRAM(S): at 21:00

## 2023-08-20 RX ADMIN — LIDOCAINE 2 PATCH: 4 CREAM TOPICAL at 18:47

## 2023-08-20 RX ADMIN — Medication 1 PACKET(S): at 10:36

## 2023-08-20 RX ADMIN — DICLOFENAC SODIUM 2 GRAM(S): 30 GEL TOPICAL at 21:01

## 2023-08-20 RX ADMIN — SIMETHICONE 80 MILLIGRAM(S): 80 TABLET, CHEWABLE ORAL at 17:34

## 2023-08-20 NOTE — PROGRESS NOTE ADULT - SUBJECTIVE AND OBJECTIVE BOX
Cc: Weakness    HPI: Patient with no new medical issues today.  Pain controlled, no chest pain, no N/V, no Fevers/Chills. No other new ROS  Has been tolerating rehabilitation program.    MEDICATIONS  (STANDING):  amLODIPine   Tablet 5 milliGRAM(s) Oral <User Schedule>  diclofenac sodium 1% Gel 2 Gram(s) Topical two times a day  enoxaparin Injectable 40 milliGRAM(s) SubCutaneous every 24 hours  ezetimibe 10 milliGRAM(s) Oral at bedtime  lidocaine   4% Patch 2 Patch Transdermal <User Schedule>  lidocaine   4% Patch 1 Patch Transdermal <User Schedule>  lisinopril 40 milliGRAM(s) Oral daily  melatonin 3 milliGRAM(s) Oral at bedtime  psyllium Powder 1 Packet(s) Oral <User Schedule>  simethicone 80 milliGRAM(s) Chew four times a day    MEDICATIONS  (PRN):  acetaminophen     Tablet .. 650 milliGRAM(s) Oral every 6 hours PRN Mild Pain (1 - 3)  bisacodyl Suppository 10 milliGRAM(s) Rectal daily PRN Constipation  ondansetron    Tablet 4 milliGRAM(s) Oral every 6 hours PRN Nausea and/or Vomiting  traMADol 25 milliGRAM(s) Oral every 6 hours PRN Moderate Pain (4 - 6)  traMADol 50 milliGRAM(s) Oral every 6 hours PRN Severe Pain (7 - 10)    Vital Signs Last 24 Hrs  T(C): 36.4 (19 Aug 2023 19:55), Max: 36.7 (19 Aug 2023 17:08)  T(F): 97.5 (19 Aug 2023 19:55), Max: 98 (19 Aug 2023 17:08)  HR: 83 (19 Aug 2023 19:55) (83 - 89)  BP: 133/73 (20 Aug 2023 05:04) (132/68 - 147/72)  BP(mean): --  RR: 17 (19 Aug 2023 19:55) (17 - 17)  SpO2: 96% (19 Aug 2023 19:55) (96% - 98%)    In NAD  HEENT- EOMI  Heart- Well Perfused  Lungs- No resp distress, no use of accessory resp muscles  Abd- + BS, NT  Ext- No calf pain  Neuro- Exam unchanged  Psych- Affect wnl    Imp: Patient with diagnosis of debility admitted for comprehensive acute rehabilitation.    Plan:  - Continue therapies  - DVT prophylaxis- Lovenox  - Skin- Turn q2h, check skin daily  - Continue current medications; patient medically stable.   - Patient is stable to continue current rehabilitation program- requires active and ongoing therapeutic interventions of multiple disciplines and can tolerate 3h/d of therapies. Can actively participate and benefit from an intensive rehabilitation program. Requires supervision of a rehabilitation physician and a coordinated interdisciplinary approach to providing rehabilitation.

## 2023-08-20 NOTE — PROGRESS NOTE ADULT - SUBJECTIVE AND OBJECTIVE BOX
Patient is a 83y old  Female who presents with a chief complaint of Debility due to small bowel obstruction s/p surgery and lysis of adhesion (20 Aug 2023 08:39)    Patient seen and examined at bedside. No acute overnight events.     ALLERGIES:  lactose (Unknown)  No Known Allergies    MEDICATIONS  (STANDING):  amLODIPine   Tablet 5 milliGRAM(s) Oral <User Schedule>  diclofenac sodium 1% Gel 2 Gram(s) Topical two times a day  enoxaparin Injectable 40 milliGRAM(s) SubCutaneous every 24 hours  ezetimibe 10 milliGRAM(s) Oral at bedtime  lidocaine   4% Patch 1 Patch Transdermal <User Schedule>  lidocaine   4% Patch 2 Patch Transdermal <User Schedule>  lisinopril 40 milliGRAM(s) Oral daily  melatonin 3 milliGRAM(s) Oral at bedtime  psyllium Powder 1 Packet(s) Oral <User Schedule>  simethicone 80 milliGRAM(s) Chew four times a day    MEDICATIONS  (PRN):  acetaminophen     Tablet .. 650 milliGRAM(s) Oral every 6 hours PRN Mild Pain (1 - 3)  bisacodyl Suppository 10 milliGRAM(s) Rectal daily PRN Constipation  ondansetron    Tablet 4 milliGRAM(s) Oral every 6 hours PRN Nausea and/or Vomiting  traMADol 25 milliGRAM(s) Oral every 6 hours PRN Moderate Pain (4 - 6)  traMADol 50 milliGRAM(s) Oral every 6 hours PRN Severe Pain (7 - 10)    Vital Signs Last 24 Hrs  T(F): 97.8 (20 Aug 2023 10:36), Max: 98 (19 Aug 2023 17:08)  HR: 84 (20 Aug 2023 10:36) (83 - 89)  BP: 118/70 (20 Aug 2023 10:36) (118/70 - 147/72)  RR: 16 (20 Aug 2023 10:36) (16 - 17)  SpO2: 97% (20 Aug 2023 10:36) (96% - 98%)      PHYSICAL EXAM:  General: NAD, awake, alert  ENT: Moist mucous membranes  Neck: Supple, no JVD  Lungs: Clear to auscultation bilaterally, no wheezes  Cardio: S1 S2, regular rate and rhythm  Abdomen: Soft, nontender, nondistended, bowel sounds present  Extremities: No calf tenderness, no pitting edema    LABS:        08-07 Chol 176 mg/dL LDL -- HDL 99 mg/dL Trig 40 mg/dL      COVID-19 PCR: NotDetelu (08-11-23 @ 05:04)      RADIOLOGY & ADDITIONAL TESTS:     Care Discussed with Consultants/Other Providers: PM&R

## 2023-08-21 ENCOUNTER — TRANSCRIPTION ENCOUNTER (OUTPATIENT)
Age: 83
End: 2023-08-21

## 2023-08-21 LAB
ANION GAP SERPL CALC-SCNC: 6 MMOL/L — SIGNIFICANT CHANGE UP (ref 5–17)
BUN SERPL-MCNC: 22 MG/DL — SIGNIFICANT CHANGE UP (ref 7–23)
CALCIUM SERPL-MCNC: 9.4 MG/DL — SIGNIFICANT CHANGE UP (ref 8.4–10.5)
CHLORIDE SERPL-SCNC: 102 MMOL/L — SIGNIFICANT CHANGE UP (ref 96–108)
CO2 SERPL-SCNC: 30 MMOL/L — SIGNIFICANT CHANGE UP (ref 22–31)
CREAT SERPL-MCNC: 0.71 MG/DL — SIGNIFICANT CHANGE UP (ref 0.5–1.3)
EGFR: 84 ML/MIN/1.73M2 — SIGNIFICANT CHANGE UP
GLUCOSE SERPL-MCNC: 89 MG/DL — SIGNIFICANT CHANGE UP (ref 70–99)
HCT VFR BLD CALC: 37.4 % — SIGNIFICANT CHANGE UP (ref 34.5–45)
HGB BLD-MCNC: 12.7 G/DL — SIGNIFICANT CHANGE UP (ref 11.5–15.5)
MCHC RBC-ENTMCNC: 32.2 PG — SIGNIFICANT CHANGE UP (ref 27–34)
MCHC RBC-ENTMCNC: 34 GM/DL — SIGNIFICANT CHANGE UP (ref 32–36)
MCV RBC AUTO: 94.7 FL — SIGNIFICANT CHANGE UP (ref 80–100)
NRBC # BLD: 0 /100 WBCS — SIGNIFICANT CHANGE UP (ref 0–0)
PLATELET # BLD AUTO: 299 K/UL — SIGNIFICANT CHANGE UP (ref 150–400)
POTASSIUM SERPL-MCNC: 4.4 MMOL/L — SIGNIFICANT CHANGE UP (ref 3.5–5.3)
POTASSIUM SERPL-SCNC: 4.4 MMOL/L — SIGNIFICANT CHANGE UP (ref 3.5–5.3)
RBC # BLD: 3.95 M/UL — SIGNIFICANT CHANGE UP (ref 3.8–5.2)
RBC # FLD: 13.4 % — SIGNIFICANT CHANGE UP (ref 10.3–14.5)
SODIUM SERPL-SCNC: 138 MMOL/L — SIGNIFICANT CHANGE UP (ref 135–145)
WBC # BLD: 5.77 K/UL — SIGNIFICANT CHANGE UP (ref 3.8–10.5)
WBC # FLD AUTO: 5.77 K/UL — SIGNIFICANT CHANGE UP (ref 3.8–10.5)

## 2023-08-21 PROCEDURE — 74177 CT ABD & PELVIS W/CONTRAST: CPT | Mod: 26

## 2023-08-21 PROCEDURE — 99232 SBSQ HOSP IP/OBS MODERATE 35: CPT

## 2023-08-21 RX ORDER — IOHEXOL 300 MG/ML
30 INJECTION, SOLUTION INTRAVENOUS ONCE
Refills: 0 | Status: COMPLETED | OUTPATIENT
Start: 2023-08-21 | End: 2023-08-21

## 2023-08-21 RX ADMIN — LIDOCAINE 1 PATCH: 4 CREAM TOPICAL at 08:29

## 2023-08-21 RX ADMIN — LIDOCAINE 2 PATCH: 4 CREAM TOPICAL at 20:00

## 2023-08-21 RX ADMIN — Medication 1 PACKET(S): at 08:31

## 2023-08-21 RX ADMIN — SIMETHICONE 80 MILLIGRAM(S): 80 TABLET, CHEWABLE ORAL at 13:19

## 2023-08-21 RX ADMIN — DICLOFENAC SODIUM 2 GRAM(S): 30 GEL TOPICAL at 05:37

## 2023-08-21 RX ADMIN — IOHEXOL 30 MILLILITER(S): 300 INJECTION, SOLUTION INTRAVENOUS at 13:42

## 2023-08-21 RX ADMIN — LISINOPRIL 40 MILLIGRAM(S): 2.5 TABLET ORAL at 05:38

## 2023-08-21 RX ADMIN — LIDOCAINE 1 PATCH: 4 CREAM TOPICAL at 22:40

## 2023-08-21 RX ADMIN — EZETIMIBE 10 MILLIGRAM(S): 10 TABLET ORAL at 21:52

## 2023-08-21 RX ADMIN — DICLOFENAC SODIUM 2 GRAM(S): 30 GEL TOPICAL at 21:53

## 2023-08-21 RX ADMIN — Medication 3 MILLIGRAM(S): at 21:52

## 2023-08-21 RX ADMIN — LIDOCAINE 2 PATCH: 4 CREAM TOPICAL at 08:29

## 2023-08-21 RX ADMIN — AMLODIPINE BESYLATE 5 MILLIGRAM(S): 2.5 TABLET ORAL at 05:38

## 2023-08-21 RX ADMIN — ENOXAPARIN SODIUM 40 MILLIGRAM(S): 100 INJECTION SUBCUTANEOUS at 17:30

## 2023-08-21 RX ADMIN — SIMETHICONE 80 MILLIGRAM(S): 80 TABLET, CHEWABLE ORAL at 17:30

## 2023-08-21 RX ADMIN — LIDOCAINE 1 PATCH: 4 CREAM TOPICAL at 20:00

## 2023-08-21 RX ADMIN — SIMETHICONE 80 MILLIGRAM(S): 80 TABLET, CHEWABLE ORAL at 05:38

## 2023-08-21 RX ADMIN — LIDOCAINE 2 PATCH: 4 CREAM TOPICAL at 22:40

## 2023-08-21 NOTE — DISCHARGE NOTE NURSING/CASE MANAGEMENT/SOCIAL WORK - NSDCFUADDAPPT_GEN_ALL_CORE_FT
Please follow up with the following providers:  [facility]  Dr Pat/Paulina hernandez     Please follow up with your PCP as soon as possible.   MD: Dr. Skip Godwin  Phone: [phone]  Address: [facility] [address] [place] [id number]     If you are in need of a PCP or a specialist in your area: contact the [facility] Physician Referral Service at [phone].

## 2023-08-21 NOTE — PROGRESS NOTE ADULT - SUBJECTIVE AND OBJECTIVE BOX
Patient is a 83y old  Female who presents with a chief complaint of Debility due to small bowel obstruction s/p surgery and lysis of adhesion (20 Aug 2023 13:18)    Patient seen and examined at bedside. No acute overnight events. Complains of lateral arm discomfort.    ALLERGIES:  lactose (Unknown)  No Known Allergies    MEDICATIONS  (STANDING):  amLODIPine   Tablet 5 milliGRAM(s) Oral <User Schedule>  diclofenac sodium 1% Gel 2 Gram(s) Topical two times a day  enoxaparin Injectable 40 milliGRAM(s) SubCutaneous every 24 hours  ezetimibe 10 milliGRAM(s) Oral at bedtime  lidocaine   4% Patch 1 Patch Transdermal <User Schedule>  lidocaine   4% Patch 2 Patch Transdermal <User Schedule>  lisinopril 40 milliGRAM(s) Oral daily  melatonin 3 milliGRAM(s) Oral at bedtime  psyllium Powder 1 Packet(s) Oral <User Schedule>  simethicone 80 milliGRAM(s) Chew four times a day    MEDICATIONS  (PRN):  acetaminophen     Tablet .. 650 milliGRAM(s) Oral every 6 hours PRN Mild Pain (1 - 3)  bisacodyl Suppository 10 milliGRAM(s) Rectal daily PRN Constipation  ondansetron    Tablet 4 milliGRAM(s) Oral every 6 hours PRN Nausea and/or Vomiting  traMADol 25 milliGRAM(s) Oral every 6 hours PRN Moderate Pain (4 - 6)  traMADol 50 milliGRAM(s) Oral every 6 hours PRN Severe Pain (7 - 10)    Vital Signs Last 24 Hrs  T(F): 98.6 (21 Aug 2023 08:32), Max: 98.6 (21 Aug 2023 08:32)  HR: 88 (21 Aug 2023 08:32) (74 - 88)  BP: 111/63 (21 Aug 2023 08:32) (111/63 - 145/73)  RR: 17 (21 Aug 2023 08:32) (17 - 17)  SpO2: 97% (21 Aug 2023 08:32) (97% - 98%)    I&O's Summary    20 Aug 2023 07:01  -  21 Aug 2023 07:00  --------------------------------------------------------  IN: 0 mL / OUT: 1 mL / NET: -1 mL      PHYSICAL EXAM:  General: NAD, awake, alert  ENT: Moist mucous membranes  Neck: Supple, no JVD  Lungs: Clear to auscultation bilaterally, no wheezes  Cardio: S1 S2, regular rate and rhythm  Abdomen: Soft, nontender, nondistended, bowel sounds present, swelling/firm area around R side abdominal surgical site  Extremities: No calf tenderness, no pitting edema    LABS:                        12.7   5.77  )-----------( 299      ( 21 Aug 2023 07:26 )             37.4       08-21    138  |  102  |  22  ----------------------------<  89  4.4   |  30  |  0.71    Ca    9.4      21 Aug 2023 07:26         08-07 Chol 176 mg/dL LDL -- HDL 99 mg/dL Trig 40 mg/dL      COVID-19 PCR: Kary (08-11-23 @ 05:04)      RADIOLOGY & ADDITIONAL TESTS:     Care Discussed with Consultants/Other Providers: PM&R

## 2023-08-21 NOTE — PROGRESS NOTE ADULT - ASSESSMENT
Assessment/Plan:  KD CONNELLY is a 83y with PMH of HTN and HLD presented to [facility] on 8/6 for abdominal pain, found to have ischemic small bowel and SBO, s/p laparotomy with HAROON on 8/6 with Dr. Chisholm. Patient now admitted for a multidisciplinary rehab program. 08-10-23 @ 13:01    * Pain controlled  * Lab results--unremarkable,   * Tenderness Rt umbilical parasurgical area--f/u ultrasound soft tissue around surgical area and Surgical consult  (d/w surgeon)  * Update give to sister in law-agrees   * Therapists to evaluate need for quad cane as requested by family     # Debility,due to Small Bowel Obstruction, (small bowel ischemia)  - s/p diagnostic laparoscopy with laparotomy with Lysis of adhesion  for SBO on 8/6 with Dr Chisholm  - Diet advanced, now tolerating regular diet  - Contnue comprehensive rehab program of PT/OT/SLP - 3 hours a day, 5 days a week. P&O as needed    * Chronic back pain--continue lidocaine patch, add ice packs prn   s/p previous BULL, will f/u with her pain mgt physician     HTN controlled  - Continue norvasc 5mg BID  --continue Lisinopril 40mg daily   --Continue to hold HCTZ will f/u with PCP    * Tenderness Rt epigastric region near surgical wound f/u ultrasound abd--soft tissue and surgical review     HLD  - Continue Zetia 10mg qd (Pharmacy agreed to restart it)    Pain  - Continue Tylenol and tramadol PRN    Sleep  - Continue Melatonin PRN     GI / Bowel  Continue Metamucil     # Left foot pain---Podiatry recs for Left foot pain appreciated   / Bladder--voiding appropriately     Skin / Pressure injury  - Skin assessment on admission performed : abdominal incision with surgiglue  - Turn q2 hours in bed while awake, air mattress  - nursing to monitor skin q Shift  - soft heel protectors  - skin barrier cream PRN    Diet/Dysphagia:  - Diet Consistency: Regular    DVT prophylaxis:   -Continue  Lovenox  ---------------  # Labs--8/21--results reviewed, normal Hb, Renal and liver function  ---------------  NOK--Son--Mr Susan Mallory --daughter in law/Staff of [facility],  [phone]     Liaison with family  8/14--I called on ph and d/w patient's son Mr Davis (ph as in EMR) and daughter in law Ms Mallory --ph as stated  She report that patient is on f/u with The same cardiology group her ar [facility] and they would appreciate a f/u review as she was due one prior to her hosp admission  Reports that patient has lumbar stenosis with previous BULL, due next one today 8/14, (deferred due to her recent surgery), --Dr Mora     Hx of diverticular disease, bowel function maintained with metamucil  Patient lives alone, family is currently trying to get her a helper  I gave update on patient's functional progress, Abd xray, recent altered bowel movements and our management plan, therapy update, Podiatry review for left foot pain and our management for her back and b/l knee pain with lidocaine patch  I told her that we would d/c miralax and commence metamucil  She was happy with the update    8/21--I called on ph and d/w Ms Mallory, patient's daughter in law. I gave functional update, discussed lab results, and ultrasound abdomen soft tissue around surgical area  She reports patient's peference for quad cane, which I promised her that we will take into consideration  She asked if patient could be seen by the surgeon, as she has post surgical appointment that is due around this time  I told her that we would request surgical consult to review the tender abdominal area and inform them about the pending post surgical f/u     Liaison with other providers  I called and d/w Surgeon Dr Magallon--tender area around surgical wound, family request for post op review prior to d/c from acute rehab. He agreed to see patient  I also informed him about the ultrasound abdomen which we have ordered to evaluate the abdominal; complaint     ---------------  IDT conference on 8/15  TDD: 8/22 to home   Barriers: Lower back pain and L foot pain  Social Work: Lives alone in  with 1STE and 1HR. Has supportive family. Looking to private hire.   DME: Needs RW  OT: [facility] for ADLs and transfers.   PT: Supervision for transfers. Ambulated 150 ft wit RW supervision.  SLP: --.N/A   ---------------  Outpatient Follow-up:  Skip Godwin  Internal Medicine  [address] [place] [address]  Phone: [phone]  Fax: [phone]  Follow Up Time:     Simon Chisholm  Surgery  [address] [place] [address], [address] [place] [id number]  Phone: [phone]  Fax: [phone]  Follow Up Time:    [facility]  Dr Pat/Paulina hernandez   ---------------

## 2023-08-21 NOTE — PROGRESS NOTE ADULT - ASSESSMENT
83F with HTN, HLD, recent surgery (HAROON) for SBO, now in acute rehab.    #Debility  #Ischemic small bowel due to SBO s/p HAROON  -US abdomen and surgical consult for firm swelling by surgical site  -Rehab, pain and bowel regimen per primary team    #Essential HTN  -BP acceptable   -c/w Norvasc 5 mg BID, Lisinopril 40 mg daily  -would hold off on HCTZ indefinitely, risks>benefits in this age group    #HLD  -c/w home Zetia    #DVT ppx  -Lovenox

## 2023-08-21 NOTE — DISCHARGE NOTE NURSING/CASE MANAGEMENT/SOCIAL WORK - PATIENT PORTAL LINK FT
You can access the FollowMyHealth Patient Portal offered by [facility] by registering at the following website: [email or web address]. By joining [employer]’s FollowMyHealth portal, you will also be able to view your health information using other applications (apps) compatible with our system.

## 2023-08-21 NOTE — CHART NOTE - NSCHARTNOTEFT_GEN_A_CORE
IDT conference on 8/15  TDD: 8/22 to home   Barriers: Lower back pain and L foot pain  Social Work: Lives alone in  with 1STE and 1HR. Has supportive family. Looking to private hire.   DME: Needs RW  OT: [facility] for ADLs and transfers.   PT: Supervision for transfers. Ambulated 150 ft wit RW supervision.  SLP: --
Nutrition Follow Up Note  Hospital Course   (Per Electronic Medical Record)    Source:  Patient [X]  Nursing Staff [X]   Medical Record [X]      Diet: Diet, Regular:   High Fiber  Supplement Feeding Modality:  Oral  Ensure Plus High Protein Cans or Servings Per Day:  1       Frequency:  Two Times a day (08-14-23 @ 12:43) [Active]    At this time patient tolerating diet w/ adequate appetite/intake consuming % of meals. No issues w/ dentition or chewing and swallowing current diet texture. Denies N/V/D, however recent constipation, last BM 8/20 Per nursing flowsheets.        Enteral/Parenteral Nutrition: Not Applicable    Current Weight: 138.8 lb on 8/15  Current Weight: 138.8lb on 8/10    Pertinent Medications: MEDICATIONS  (STANDING):  amLODIPine   Tablet 5 milliGRAM(s) Oral <User Schedule>  diclofenac sodium 1% Gel 2 Gram(s) Topical two times a day  enoxaparin Injectable 40 milliGRAM(s) SubCutaneous every 24 hours  ezetimibe 10 milliGRAM(s) Oral at bedtime  iohexol 300 mG (iodine)/mL Oral Solution 30 milliLiter(s) Oral once  lidocaine   4% Patch 1 Patch Transdermal <User Schedule>  lidocaine   4% Patch 2 Patch Transdermal <User Schedule>  lisinopril 40 milliGRAM(s) Oral daily  melatonin 3 milliGRAM(s) Oral at bedtime  psyllium Powder 1 Packet(s) Oral <User Schedule>  simethicone 80 milliGRAM(s) Chew four times a day    MEDICATIONS  (PRN):  acetaminophen     Tablet .. 650 milliGRAM(s) Oral every 6 hours PRN Mild Pain (1 - 3)  bisacodyl Suppository 10 milliGRAM(s) Rectal daily PRN Constipation  ondansetron    Tablet 4 milliGRAM(s) Oral every 6 hours PRN Nausea and/or Vomiting  traMADol 25 milliGRAM(s) Oral every 6 hours PRN Moderate Pain (4 - 6)  traMADol 50 milliGRAM(s) Oral every 6 hours PRN Severe Pain (7 - 10)      Pertinent Labs:  08-21 Na138 mmol/L Glu 89 mg/dL K+ 4.4 mmol/L Cr  0.71 mg/dL BUN 22 mg/dL 08-07 Chol 176 mg/dL LDL --    HDL 99 mg/dL Trig 40 mg/dL      Skin: No pressure injury per nursing flowsheets, Surgical Incision, Site midline     Edema: No edema noted per nursing flowsheet    Last Bowel Movement: on 8/20 Per nursing flowsheets     Estimated Needs:   [X] No Change Since Previous Assessment    Previous Nutrition Diagnosis:   Malnutrition...  Moderate, acute  Related to related to decreased ability to meet estimated energy needs  As evidenced by moderate signs of subcutaneous fat loss and muscle depletion.      Nutrition Diagnosis is [X] Ongoing - addressed w/ Ensure Plus High Protein Daily 8 oz (Provides 350 kcal, 20 grams of protein) BID     New Nutrition Diagnosis: [X] Not Applicable    Interventions:   1. Recommend continuing with current plan of care  2. Encourage PO intake  3. Obtain and honor food preferences as able  4. Ongoing diet education     Monitoring & Evaluation:   [X] Weights   [X] PO Intake   [X] Skin Integrity   [X] Follow Up (Per Protocol)  [X] Tolerance to Diet Prescription   [X] Other: Labs     Registered Dietitian/Nutritionist Remains Available.  Juana Krishnan RD, MS, CDN    Phone# [phone]
[facility] Interdisciplinary Plan of Care    REHABILITATION DIAGNOSIS:  Debility due to laparatomy for small bowel obstruction           COMORBIDITIES/COMPLICATING CONDITIONS IMPACTING REHABILITATION:  HEALTH ISSUES - PROBLEM Dx:    HTN  HLD  OA knees   Left foot pain     PAST MEDICAL & SURGICAL HISTORY:  HTN (hypertension)      Osteoarthritis      Osteoporosis      S/P hernia repair  1993      S/P CESARIO (total abdominal hysterectomy)      S/P D&C (status post dilation and curettage)  x3          Based upon consideration of the patient's impairments, functional status, complicating conditions and any other contributing factors and after information garnered from the assessments of all therapy disciplines involved in treating the patient and other pertinent clinicians:    INTERDISCIPLINARY REHABILITATION INTERVENTIONS:    [ X  ] Transfer Training  [ X  ] Bed Mobility  [ X  ] Therapeutic Exercise  [ X ] Balance/Coordination Exercises  [ X ] Locomotion retraining  [ X  ] Stairs  [  X ] Functional Transfer Training  [   ] Bowel/Bladder program  [  x ] Pain Management  [   ] Skin/Wound Care  [   ] Visual/Perceptual Training  [   ] Therapeutic Recreation Activities  [   ] Neuromuscular Re-education  [ X  ] Activities of Daily Living  [   ] Speech Exercise  [   ] Swallowing Exercises  [   ] Vital Stim  [   ] Dietary Supplements  [   ] Calorie Count  [   ] Cognitive Exercises  [   ] Congnitive/Linguistic Treatment  [   ] Behavior Program  [   ] Neuropsych Therapy  [ X  ] Patient/Family Counseling  [ X ] Family Training  [ X  ] Community Re-entry  [   ] Orthotic Evaluation  [   ] Prosthetic Eval/Training    MEDICAL PROGNOSIS:  Good     REHAB POTENTIAL:  Good     EXPECTED DAILY THERAPY:         PT: 1.5 hr        OT: 1. 5 hr        ST: n/a        P&O: n/a    EXPECTED INTENSITY OF PROGRAM:  3 hrs / Day    EXPECTED FREQUENCY OF PROGRAM: 5 Days/ Week    ESTIMATED LOS:  [  ] 5-7 Days  [x  ] 7-10 Days  [  ] 10- 14 Days  [  ] 14- 18 Days  [  ] 18- 21 Days    ESTIMATED DISPOSITION:  [  ] Home   [ x ] Home with Outpatient Therapies  [  ] Home with Home Therapies  [  ] Assisted Living  [  ] Nursing Home  [  ] Long Term Acute Care    INTERDISCIPLINARY FUNCTIONAL OUTCOMES/GOALS:         Gait/Mobility: 6       Transfers: 6       ADLs: 6       Functional Transfers: 6       Medication Management: 5       Communication: 7       Cognitive: 6       Dysphagia: 6       Bladder 6       Bowel: 6     Functional Independent Measures:   7 = Independent  6 = Modified Independent  5 = Supervision  4 = Minimal Assist/ Contact Guard  3 = Moderate Assistance  2 = Maximum Assistance  1 = Total Assistance  0 = Unable to assess
MD called to bedside for episode of nonbloody emesis associated w/ lower abdominal pain. Pt examined at bedside. Endorses pain across lower abdomen. Recently passed some flatus and hard stool following suppository. No fever, chills, chest pain.    Temp 97.9F, Pulse 74bpm, /67, RR 16, SpO2 93% RA    Gen: seated in discomfort but NAD  Head: atraumatic  Eyes: anicteric  CV: RRR; no murmur, rub, gallop  Resp: normal effort on room air; clear anteriorly  Abd: mildly distended, soft, tender to palpation of LLQ + RLQ  Ext: no edema  Skin: warm  Neuro: alert  Psych: anxious    83yoF w/ h/o HTN + HLD admitted for acute rehab following small bowel obstruction + ischemia s/p laparotomy w/ HAROON. Given history, lower abdominal pain w/ emesis possibly related to constipation, also concerning for SBO or new GI/ process. Zofran, simethicone, and KUB ordered.     KUB reviewed w/ hospitalist. No acutely concerning findings noted. Consider CT for persistent/worsening sx.    Per RN, lower abdominal pain and nausea significantly resolved following simethicone + voluminous BM.
Nutrition Follow Up Note  Hospital Course   (Per Electronic Medical Record)    Source:  Patient [X]  Nursing Staff [X]   Medical Record [X]      Diet: Diet, Regular:   High Fiber  Supplement Feeding Modality:  Oral  Ensure Plus High Protein Cans or Servings Per Day:  1       Frequency:  Two Times a day (08-14-23 @ 12:43) [Active]    At this time patient tolerating diet w/ fair appetite/intake consuming 51-75% of most meals and reports good acceptance of Ensure Plus High Protein Daily 8 oz (Provides 350 kcal, 20 grams of protein) BID. No issues w/ dentition or chewing and swallowing current diet texture.  Denies N/V/C/D, last BM 8/15 Per nursing flowsheets. Encouraged continued PO intake of fluid/increased fiber at meals to assist w/ regular BM, will send prunes TID.     Enteral/Parenteral Nutrition: Not Applicable    Current Weight: 138.8lb on 8/10    Pertinent Medications: MEDICATIONS  (STANDING):  amLODIPine   Tablet 5 milliGRAM(s) Oral <User Schedule>  enoxaparin Injectable 40 milliGRAM(s) SubCutaneous every 24 hours  ezetimibe 10 milliGRAM(s) Oral at bedtime  lidocaine   4% Patch 1 Patch Transdermal <User Schedule>  lidocaine   4% Patch 2 Patch Transdermal <User Schedule>  lisinopril 40 milliGRAM(s) Oral daily  melatonin 3 milliGRAM(s) Oral at bedtime  psyllium Powder 1 Packet(s) Oral <User Schedule>  simethicone 80 milliGRAM(s) Chew four times a day    MEDICATIONS  (PRN):  acetaminophen     Tablet .. 650 milliGRAM(s) Oral every 6 hours PRN Mild Pain (1 - 3)  bisacodyl Suppository 10 milliGRAM(s) Rectal daily PRN Constipation  ondansetron    Tablet 4 milliGRAM(s) Oral every 6 hours PRN Nausea and/or Vomiting  traMADol 50 milliGRAM(s) Oral every 6 hours PRN Severe Pain (7 - 10)  traMADol 25 milliGRAM(s) Oral every 6 hours PRN Moderate Pain (4 - 6)      Pertinent Labs:  08-14 Na137 mmol/L Glu 86 mg/dL K+ 3.7 mmol/L Cr  0.60 mg/dL BUN 15 mg/dL 08-11 Alb 2.6 g/dL<L> 08-07 Chol 176 mg/dL LDL --    HDL 99 mg/dL Trig 40 mg/dL      Skin: No pressure injury per nursing flowsheets, Surgical Incision, Site midline     Edema: No edema noted per nursing flowsheet    Last Bowel Movement: on 8/15 Per nursing flowsheets     Estimated Needs:   [X] No Change Since Previous Assessment    Previous Nutrition Diagnosis:   yes...  Malnutrition...  Moderate, acute  Related to related to decreased ability to meet estimated energy needs  As evidenced by moderate signs of subcutaneous fat loss and muscle depletion.      Nutrition Diagnosis is [X] Ongoing - addressed w/ Ensure Plus High Protein Daily 8 oz (Provides 350 kcal, 20 grams of protein) BID     New Nutrition Diagnosis: [X] Not Applicable    Interventions:   1. Recommend continuing with current plan of care- High Fiber  2. Encourage PO intake  3. Obtain and honor food preferences as able  4. Ongoing diet education     Monitoring & Evaluation:   [X] Weights   [X] PO Intake   [X] Skin Integrity   [X] Follow Up (Per Protocol)  [X] Tolerance to Diet Prescription   [X] Other: Labs    Registered Dietitian/Nutritionist Remains Available.  Juana Krishnan RD, MS, CDN    Phone# [phone]

## 2023-08-21 NOTE — PROGRESS NOTE ADULT - SUBJECTIVE AND OBJECTIVE BOX
Subjective  Seen and examined  Reports good night rest, regular bowel movements, 1-2x/day, solid texture  Reports pain over left lateral deltoid and Triceps muscle, no redness, or swelling  Pain at knees and back responsive to lidocaine patch and diclofenac gel    ROS--No nausea, vomiting, chest or abd pain  Mild tenderness to pressure over Right umbilical area near surgical wound, no erythema  LBM 88/21      Therapy--Engaging in therapy.  Ambulating functional distance    Update given to daughter in law--Ms Mallory  She reports that patient would prefer a quad cane, as she finds this more convenient when ambulating with it rather than a walker  I told her that we would discuss this with therapists and order if recommended  She reports that patient is often hesitant to use some DMEs especially those needed for shower, but I have told her to encourage patient to use all recommended DMEs tor safety and falls prevention  Our team will also re enforce this     ICU Vital Signs Last 24 Hrs  T(C): 37 (21 Aug 2023 08:32), Max: 37 (21 Aug 2023 08:32)  T(F): 98.6 (21 Aug 2023 08:32), Max: 98.6 (21 Aug 2023 08:32)  HR: 88 (21 Aug 2023 08:32) (74 - 88)  BP: 111/63 (21 Aug 2023 08:32) (111/63 - 145/73)  RR: 17 (21 Aug 2023 08:32) (17 - 17)  SpO2: 97% (21 Aug 2023 08:32) (97% - 98%)    O2 Parameters below as of 21 Aug 2023 08:32  Patient On (Oxygen Delivery Method): room air      EXAM  Gen - Comfortable, in no acute distress   HEENT - neck supple   Pulm - Clear   Cardiovascular - RRR, S1S2  Chest - Normal heart sounds  Abdomen - Soft, non tender, +bowel sounds   Extremities - No Cyanosis, no clubbing, no edema, no calf tenderness    Neuro-  Alert and fully oriented     Cranial Nerves - CN 2-12 intact.     Motor - 4+/5 all extremities      Sensory - Intact  to LT      Reflexes - DTR 2+      Coordination - No dysmetria     Tone - normal  Psychiatric - Mood stable, Affect WNL  Skin:f central abdomen surgical scar, unremarkable  Mild tenderness right umbilical area, near surgical scar   RECENT LABS/IMAGING                        12.7   5.77  )-----------( 299      ( 21 Aug 2023 07:26 )             37.4     08-21    138  |  102  |  22  ----------------------------<  89  4.4   |  30  |  0.71    Ca    9.4      21 Aug 2023 07:26        Urinalysis Basic - ( 21 Aug 2023 07:26 )    Color: x / Appearance: x / SG: x / pH: x  Gluc: 89 mg/dL / Ketone: x  / Bili: x / Urobili: x   Blood: x / Protein: x / Nitrite: x   Leuk Esterase: x / RBC: x / WBC x   Sq Epi: x / Non Sq Epi: x / Bacteria: x    MEDICATIONS  (STANDING):  amLODIPine   Tablet 5 milliGRAM(s) Oral <User Schedule>  diclofenac sodium 1% Gel 2 Gram(s) Topical two times a day  enoxaparin Injectable 40 milliGRAM(s) SubCutaneous every 24 hours  ezetimibe 10 milliGRAM(s) Oral at bedtime  lidocaine   4% Patch 2 Patch Transdermal <User Schedule>  lidocaine   4% Patch 1 Patch Transdermal <User Schedule>  lisinopril 40 milliGRAM(s) Oral daily  melatonin 3 milliGRAM(s) Oral at bedtime  psyllium Powder 1 Packet(s) Oral <User Schedule>  simethicone 80 milliGRAM(s) Chew four times a day    MEDICATIONS  (PRN):  acetaminophen     Tablet .. 650 milliGRAM(s) Oral every 6 hours PRN Mild Pain (1 - 3)  bisacodyl Suppository 10 milliGRAM(s) Rectal daily PRN Constipation  ondansetron    Tablet 4 milliGRAM(s) Oral every 6 hours PRN Nausea and/or Vomiting  traMADol 50 milliGRAM(s) Oral every 6 hours PRN Severe Pain (7 - 10)  traMADol 25 milliGRAM(s) Oral every 6 hours PRN Moderate Pain (4 - 6)

## 2023-08-22 VITALS
SYSTOLIC BLOOD PRESSURE: 121 MMHG | DIASTOLIC BLOOD PRESSURE: 67 MMHG | OXYGEN SATURATION: 96 % | HEART RATE: 90 BPM | RESPIRATION RATE: 16 BRPM | TEMPERATURE: 98 F

## 2023-08-22 DIAGNOSIS — K56.609 UNSPECIFIED INTESTINAL OBSTRUCTION, UNSPECIFIED AS TO PARTIAL VERSUS COMPLETE OBSTRUCTION: ICD-10-CM

## 2023-08-22 PROCEDURE — 74019 RADEX ABDOMEN 2 VIEWS: CPT | Mod: 26

## 2023-08-22 PROCEDURE — 99239 HOSP IP/OBS DSCHRG MGMT >30: CPT

## 2023-08-22 PROCEDURE — 99232 SBSQ HOSP IP/OBS MODERATE 35: CPT

## 2023-08-22 RX ORDER — DICLOFENAC SODIUM 30 MG/G
1 GEL TOPICAL
Qty: 1 | Refills: 0
Start: 2023-08-22 | End: 2023-09-20

## 2023-08-22 RX ORDER — AMLODIPINE BESYLATE 2.5 MG/1
1 TABLET ORAL
Qty: 60 | Refills: 0
Start: 2023-08-22 | End: 2023-09-20

## 2023-08-22 RX ORDER — SIMETHICONE 80 MG/1
1 TABLET, CHEWABLE ORAL
Qty: 120 | Refills: 0
Start: 2023-08-22 | End: 2023-09-20

## 2023-08-22 RX ORDER — LANOLIN ALCOHOL/MO/W.PET/CERES
1 CREAM (GRAM) TOPICAL
Qty: 30 | Refills: 0
Start: 2023-08-22 | End: 2023-09-20

## 2023-08-22 RX ORDER — PSYLLIUM SEED (WITH DEXTROSE)
1 POWDER (GRAM) ORAL
Qty: 30 | Refills: 0
Start: 2023-08-22 | End: 2023-09-20

## 2023-08-22 RX ORDER — EZETIMIBE 10 MG/1
1 TABLET ORAL
Qty: 30 | Refills: 0
Start: 2023-08-22 | End: 2023-09-20

## 2023-08-22 RX ORDER — DICLOFENAC SODIUM 30 MG/G
1 GEL TOPICAL
Qty: 0 | Refills: 0 | DISCHARGE
Start: 2023-08-22

## 2023-08-22 RX ORDER — LISINOPRIL 2.5 MG/1
1 TABLET ORAL
Qty: 30 | Refills: 0
Start: 2023-08-22 | End: 2023-09-20

## 2023-08-22 RX ORDER — LIDOCAINE 4 G/100G
3 CREAM TOPICAL
Qty: 90 | Refills: 0
Start: 2023-08-22 | End: 2023-09-20

## 2023-08-22 RX ADMIN — SIMETHICONE 80 MILLIGRAM(S): 80 TABLET, CHEWABLE ORAL at 05:27

## 2023-08-22 RX ADMIN — AMLODIPINE BESYLATE 5 MILLIGRAM(S): 2.5 TABLET ORAL at 07:01

## 2023-08-22 RX ADMIN — Medication 1 PACKET(S): at 07:40

## 2023-08-22 RX ADMIN — LISINOPRIL 40 MILLIGRAM(S): 2.5 TABLET ORAL at 05:27

## 2023-08-22 RX ADMIN — SIMETHICONE 80 MILLIGRAM(S): 80 TABLET, CHEWABLE ORAL at 12:52

## 2023-08-22 RX ADMIN — DICLOFENAC SODIUM 2 GRAM(S): 30 GEL TOPICAL at 05:27

## 2023-08-22 NOTE — PROGRESS NOTE ADULT - PROVIDER SPECIALTY LIST ADULT
Hospitalist
Rehab Medicine
Hospitalist
Rehab Medicine
Hospitalist
Hospitalist
Rehab Medicine
Rehab Medicine
Hospitalist
Rehab Medicine
Rehab Medicine
Surgery
Rehab Medicine

## 2023-08-22 NOTE — PROGRESS NOTE ADULT - ASSESSMENT
83 yof pmh htn, presented to the ER with abdominal pain, w/u and underwent Dx lap/HAROON 8/6, rehab called for abdominal incisional pain.

## 2023-08-22 NOTE — PROGRESS NOTE ADULT - SUBJECTIVE AND OBJECTIVE BOX
83 yof pmh htn, presented to the ER with abdominal pain, w/u and underwent Dx lap/HAROON 8/6, rehab called for abdominal incisional pain. Pt seen and appears comfortable, tolerating physical therapy tolerating diet, having bowel movement, denies N/v/cp/sob    PAST MEDICAL & SURGICAL HISTORY:  HTN (hypertension)      Osteoarthritis      Osteoporosis      S/P hernia repair  1993      S/P CESARIO (total abdominal hysterectomy)      S/P D&C (status post dilation and curettage)  x3      MEDICATIONS  (STANDING):  amLODIPine   Tablet 5 milliGRAM(s) Oral <User Schedule>  diclofenac sodium 1% Gel 2 Gram(s) Topical two times a day  enoxaparin Injectable 40 milliGRAM(s) SubCutaneous every 24 hours  ezetimibe 10 milliGRAM(s) Oral at bedtime  lidocaine   4% Patch 1 Patch Transdermal <User Schedule>  lidocaine   4% Patch 2 Patch Transdermal <User Schedule>  lisinopril 40 milliGRAM(s) Oral daily  melatonin 3 milliGRAM(s) Oral at bedtime  psyllium Powder 1 Packet(s) Oral <User Schedule>  simethicone 80 milliGRAM(s) Chew four times a day    MEDICATIONS  (PRN):  acetaminophen     Tablet .. 650 milliGRAM(s) Oral every 6 hours PRN Mild Pain (1 - 3)  bisacodyl Suppository 10 milliGRAM(s) Rectal daily PRN Constipation  ondansetron    Tablet 4 milliGRAM(s) Oral every 6 hours PRN Nausea and/or Vomiting  traMADol 50 milliGRAM(s) Oral every 6 hours PRN Severe Pain (7 - 10)  traMADol 25 milliGRAM(s) Oral every 6 hours PRN Moderate Pain (4 - 6)    PE: Vital Signs Last 24 Hrs  T(C): 36.9 (21 Aug 2023 21:50), Max: 37 (21 Aug 2023 08:32)  T(F): 98.5 (21 Aug 2023 21:50), Max: 98.6 (21 Aug 2023 08:32)  HR: 74 (22 Aug 2023 06:58) (74 - 100)  BP: 145/77 (22 Aug 2023 06:58) (111/63 - 145/77)  BP(mean): --  RR: 16 (22 Aug 2023 06:58) (16 - 17)  SpO2: 97% (22 Aug 2023 06:58) (96% - 97%)    Parameters below as of 22 Aug 2023 06:58  Patient On (Oxygen Delivery Method): room air    Gen: Awake in NAD  abd: abd soft, on midline incision distal aspect mild induration tender, no erythema or drainage noted, nonguarding  : voiding   LE: calfs soft, nontender, no swelling                           12.7   5.77  )-----------( 299      ( 21 Aug 2023 07:26 )             37.4       08-21    138  |  102  |  22  ----------------------------<  89  4.4   |  30  |  0.71    Ca    9.4      21 Aug 2023 07:26      CT scan: IMPRESSION: No evidence for mechanical bowel obstruction. No free   intraperitoneal air or fluid. No intra-abdominal/pelvic fluid collections   identified.

## 2023-08-22 NOTE — PROGRESS NOTE ADULT - PROBLEM SELECTOR PLAN 1
will fu abd xray In am   continue current care  will continue to follow  discussed with surgeon will fu abd xray In am   continue current care  will continue to follow    1231pm   xray reviewed by attending  pt stable for discharge  follow up as outpt within 2 weeks

## 2023-08-22 NOTE — PROGRESS NOTE ADULT - ASSESSMENT
Assessment/Plan:  KD CONNELLY is a 83y with PMH of HTN and HLD presented to [facility] on 8/6 for abdominal pain, found to have ischemic small bowel and SBO, s/p laparotomy with HAROON on 8/6 with Dr. Chisholm. Patient now admitted for a multidisciplinary rehab program. 08-10-23 @ 13:01    * Xray abdomen today, review by surgical team before discharge  * CT abdomen--unremarkable    # Debility,due to Small Bowel Obstruction, (small bowel ischemia)  - s/p diagnostic laparoscopy with laparotomy with Lysis of adhesion  for SBO on 8/6 with Dr Chisholm  - Diet advanced, now tolerating regular diet  - Contnue comprehensive rehab program of PT/OT/SLP - 3 hours a day, 5 days a week. P&O as needed    * Chronic back pain--continue lidocaine patch, add ice packs prn   s/p previous BULL, will f/u with her pain mgt physician     HTN controlled  - Continue norvasc 5mg BID  --continue Lisinopril 40mg daily   --Continue to hold HCTZ will f/u with PCP    * Tenderness Rt epigastric region, resolved   * Xray abdomen today,8/22 review by surgical team before discharge  * CT abdomen 8/21 --unremarkable      HLD  - Continue Zetia 10mg qd (Pharmacy agreed to restart it)    Pain  - Continue Tylenol and tramadol PRN    Sleep  - Continue Melatonin PRN     GI / Bowel  Continue Metamucil     # Left foot pain---Podiatry recs for Left foot pain appreciated   / Bladder--voiding appropriately     Skin / Pressure injury--abdominal surgical scar  - skin barrier cream PRN    Diet/Dysphagia:  - Diet Consistency: Regular    DVT prophylaxis:   -Continue  Lovenox  ---------------  # Labs--8/21--results reviewed, normal Hb, Renal and liver function  ---------------  NOK--Son--Mr Susan Mallory --daughter in law/Staff of [facility],  [phone]     Liaison with family  8/14--I called on ph and d/w patient's son Mr Davis (ph as in EMR) and daughter in law Ms Mallory --ph as stated  She report that patient is on f/u with The same cardiology group her ar [facility] and they would appreciate a f/u review as she was due one prior to her hosp admission  Reports that patient has lumbar stenosis with previous BULL, due next one today 8/14, (deferred due to her recent surgery), --Dr Mora     Hx of diverticular disease, bowel function maintained with metamucil  Patient lives alone, family is currently trying to get her a helper  I gave update on patient's functional progress, Abd xray, recent altered bowel movements and our management plan, therapy update, Podiatry review for left foot pain and our management for her back and b/l knee pain with lidocaine patch  I told her that we would d/c miralax and commence metamucil  She was happy with the update    8/21--I called on ph and d/w Ms Mallory, patient's daughter in law. I gave functional update, discussed lab results, and ultrasound abdomen soft tissue around surgical area  She reports patient's peference for quad cane, which I promised her that we will take into consideration  She asked if patient could be seen by the surgeon, as she has post surgical appointment that is due around this time  I told her that we would request surgical consult to review the tender abdominal area and inform them about the pending post surgical f/u     Liaison with other providers  I called and d/w Surgeon Dr Magallon--tender area around surgical wound, family request for post op review prior to d/c from acute rehab. He agreed to see patient  I also informed him about the ultrasound abdomen which we have ordered to evaluate the abdominal; complaint     8/22--Liaison with Surgical team  They recommended xray abdomen today, to be reviewed prior to dc  CT abdomen--no acute findings   ---------------  IDT conference on 8/15  TDD: 8/22 to home   Barriers: Lower back pain and L foot pain  Social Work: Lives alone in  with 1STE and 1HR. Has supportive family. Looking to private hire.   DME: Needs RW  OT: [facility] for ADLs and transfers.   PT: Supervision for transfers. Ambulated 150 ft wit RW supervision.  SLP: --.N/A   ---------------  Outpatient Follow-up:  Skip Godwin  Internal Medicine  [address] [place] [address]  Phone: [phone]  Fax: [phone]  Follow Up Time:     Simon Chisholm  Surgery  [address] [place] [address], [address] [address]  Phone: [phone]  Fax: [phone]  Follow Up Time:    [facility]  Dr Pat/Paulina hernandez   ---------------

## 2023-08-22 NOTE — PROGRESS NOTE ADULT - NUTRITIONAL ASSESSMENT
This patient has been assessed with a concern for Malnutrition and has been determined to have a diagnosis/diagnoses of Moderate protein-calorie malnutrition.    This patient is being managed with:   Diet Regular-  Supplement Feeding Modality:  Oral  Ensure Plus High Protein Cans or Servings Per Day:  1       Frequency:  Daily  Entered: Aug 11 2023  1:38PM  
This patient has been assessed with a concern for Malnutrition and has been determined to have a diagnosis/diagnoses of Moderate protein-calorie malnutrition.    This patient is being managed with:   Diet Regular-  Supplement Feeding Modality:  Oral  Ensure Plus High Protein Cans or Servings Per Day:  1       Frequency:  Daily  Entered: Aug 11 2023  1:38PM  
This patient has been assessed with a concern for Malnutrition and has been determined to have a diagnosis/diagnoses of Moderate protein-calorie malnutrition.    This patient is being managed with:   Diet Regular-  Supplement Feeding Modality:  Oral  Ensure Plus High Protein Cans or Servings Per Day:  1       Frequency:  Daily  Entered: Aug 11 2023  1:38PM    This patient has been assessed with a concern for Malnutrition and has been determined to have a diagnosis/diagnoses of Moderate protein-calorie malnutrition.    This patient is being managed with:   Diet Regular-  Supplement Feeding Modality:  Oral  Ensure Plus High Protein Cans or Servings Per Day:  1       Frequency:  Daily  Entered: Aug 11 2023  1:38PM  
This patient has been assessed with a concern for Malnutrition and has been determined to have a diagnosis/diagnoses of Moderate protein-calorie malnutrition.    This patient is being managed with:   Diet Regular-  High Fiber  Supplement Feeding Modality:  Oral  Ensure Plus High Protein Cans or Servings Per Day:  1       Frequency:  Two Times a day  Entered: Aug 14 2023 12:42PM  

## 2023-08-22 NOTE — PROGRESS NOTE ADULT - SUBJECTIVE AND OBJECTIVE BOX
Subjective  Seen and examined, no medical complaint, slept well  Her daughter in law Ms Mallory present during review  Tolerating oral diet  Left upper arm pain and back pain responsive to voltaren gell  Requests script for this prior to dc     ROS--No nausea, vomiting, chest or abd pain  Mild tenderness to pressure over Right umbilical area near surgical wound, no erythema  LBM 88/21    Liaison with surgery  CT abdomen yesterday unremarkable  Surgical team wants xray abd today, to be reviewed prior to dc       ICU Vital Signs Last 24 Hrs  T(C): 36.7 (22 Aug 2023 09:45), Max: 36.9 (21 Aug 2023 21:50)  T(F): 98.1 (22 Aug 2023 09:45), Max: 98.5 (21 Aug 2023 21:50)  HR: 90 (22 Aug 2023 09:45) (74 - 100)  BP: 121/67 (22 Aug 2023 09:45) (121/67 - 145/77)  RR: 16 (22 Aug 2023 09:45) (16 - 16)  SpO2: 96% (22 Aug 2023 09:45) (96% - 97%)  O2 Parameters below as of 22 Aug 2023 09:45  Patient On (Oxygen Delivery Method): room air      EXAM  Gen - Comfortable,   HEENT - neck supple   Pulm - Clear   Cardiovascular - RRR, S1S2  Chest - Normal heart sounds  Abdomen - Soft, non tender, +bowel sounds   Extremities - No Cyanosis, no clubbing, no edema, no calf tenderness    Neuro-  Alert and fully oriented     Cranial Nerves - CN 2-12 intact.     Motor - 5/5 all extremities      Sensory - Intact  to LT      Reflexes - DTR 2+      Coordination - No dysmetria     Tone - normal  Psychiatric - Mood stable, Affect WNL  Skin: central abdomen surgical scar, non tender,     RECENT LABS/IMAGING                        12.7   5.77  )-----------( 299      ( 21 Aug 2023 07:26 )             37.4     08-21    138  |  102  |  22  ----------------------------<  89  4.4   |  30  |  0.71    Ca    9.4      21 Aug 2023 07:26    PROCEDURE DATE:  08/21/2023          INTERPRETATION:  CLINICAL INFORMATION: History of suspected closed-loop   small bowel obstruction with ischemia. Evaluate for intra-abdominal   collection.    COMPARISON: 8/6/2023.    CONTRAST/COMPLICATIONS:  IV Contrast: Omnipaque 350  90 cc administered   10 cc discarded  Oral Contrast: Gastroview  Complications: None reported at time of study completion    PROCEDURE:  CT of the Abdomen and Pelvis was performed.  Sagittal and coronal reformats were performed.    FINDINGS:  LOWER CHEST: 5 mm nodule left lower lobe, stable. Partial visualization   of 5 mm nodule right lower lobe not previously imaged. 4 mm nodule right   lower lobe, stable.    LIVER: Normal in size. No focal hepatic masses identified. Focal   benign-appearing hepatic parenchymal calcification.  BILE DUCTS: Normal caliber.  GALLBLADDER: Contracted gallbladder state precluding assessment for   gallbladder wall thickening. No definite gallstones identified.  SPLEEN: Within normal limits.  PANCREAS: Within normal limits.  ADRENALS: Within normal limits.  KIDNEYS/URETERS: Within normal limits.    BLADDER: Within normal limits.  REPRODUCTIVE ORGANS: Hysterectomy. Stable 2.7 cm cystic lesion left   adnexa.    BOWEL: Fecal material scattered throughout the colon. No evidence for   mechanical bowel obstruction. Colonic diverticulosis. No colonic wall   thickening. Appendix not visualized.  PERITONEUM:No free intraperitoneal air or fluid. No   intra-abdominal/pelvic fluid collection identified.  VESSELS: Atherosclerotic changes.  RETROPERITONEUM/LYMPH NODES: No lymphadenopathy.  ABDOMINAL WALL: Postsurgical changes anterior abdominal wall.  BONES:Degenerative changes. Levoscoliosis. Multilevel intervertebral   disc space narrowing.    IMPRESSION: No evidence for mechanical bowel obstruction. No free   intraperitoneal air or fluid. No intra-abdominal/pelvic fluid collections   identified.            MEDICATIONS  (STANDING):  amLODIPine   Tablet 5 milliGRAM(s) Oral <User Schedule>  diclofenac sodium 1% Gel 2 Gram(s) Topical two times a day  enoxaparin Injectable 40 milliGRAM(s) SubCutaneous every 24 hours  ezetimibe 10 milliGRAM(s) Oral at bedtime  lidocaine   4% Patch 1 Patch Transdermal <User Schedule>  lidocaine   4% Patch 2 Patch Transdermal <User Schedule>  lisinopril 40 milliGRAM(s) Oral daily  melatonin 3 milliGRAM(s) Oral at bedtime  psyllium Powder 1 Packet(s) Oral <User Schedule>  simethicone 80 milliGRAM(s) Chew four times a day    MEDICATIONS  (PRN):  acetaminophen     Tablet .. 650 milliGRAM(s) Oral every 6 hours PRN Mild Pain (1 - 3)  bisacodyl Suppository 10 milliGRAM(s) Rectal daily PRN Constipation  ondansetron    Tablet 4 milliGRAM(s) Oral every 6 hours PRN Nausea and/or Vomiting  traMADol 25 milliGRAM(s) Oral every 6 hours PRN Moderate Pain (4 - 6)  traMADol 50 milliGRAM(s) Oral every 6 hours PRN Severe Pain (7 - 10)  < from: CT Abdomen and Pelvis w/ Oral Cont and w/ IV Cont (08.21.23 @ 15:21) >      < end of copied text >

## 2023-08-22 NOTE — PROGRESS NOTE ADULT - REASON FOR ADMISSION
Debility due to small bowel obstruction s/p surgery and lysis of adhesion
Yes

## 2023-08-22 NOTE — PROGRESS NOTE ADULT - SUBJECTIVE AND OBJECTIVE BOX
Patient is a 83y old  Female who presents with a chief complaint of Debility due to small bowel obstruction s/p surgery and lysis of adhesion (22 Aug 2023 10:12)    Patient seen and examined at bedside. No acute overnight events.     ALLERGIES:  lactose (Unknown)  No Known Allergies    MEDICATIONS  (STANDING):  amLODIPine   Tablet 5 milliGRAM(s) Oral <User Schedule>  diclofenac sodium 1% Gel 2 Gram(s) Topical two times a day  enoxaparin Injectable 40 milliGRAM(s) SubCutaneous every 24 hours  ezetimibe 10 milliGRAM(s) Oral at bedtime  lidocaine   4% Patch 2 Patch Transdermal <User Schedule>  lidocaine   4% Patch 1 Patch Transdermal <User Schedule>  lisinopril 40 milliGRAM(s) Oral daily  melatonin 3 milliGRAM(s) Oral at bedtime  psyllium Powder 1 Packet(s) Oral <User Schedule>  simethicone 80 milliGRAM(s) Chew four times a day    MEDICATIONS  (PRN):  acetaminophen     Tablet .. 650 milliGRAM(s) Oral every 6 hours PRN Mild Pain (1 - 3)  bisacodyl Suppository 10 milliGRAM(s) Rectal daily PRN Constipation  ondansetron    Tablet 4 milliGRAM(s) Oral every 6 hours PRN Nausea and/or Vomiting  traMADol 25 milliGRAM(s) Oral every 6 hours PRN Moderate Pain (4 - 6)  traMADol 50 milliGRAM(s) Oral every 6 hours PRN Severe Pain (7 - 10)    Vital Signs Last 24 Hrs  T(F): 98.1 (22 Aug 2023 09:45), Max: 98.5 (21 Aug 2023 21:50)  HR: 90 (22 Aug 2023 09:45) (74 - 100)  BP: 121/67 (22 Aug 2023 09:45) (121/67 - 145/77)  RR: 16 (22 Aug 2023 09:45) (16 - 16)  SpO2: 96% (22 Aug 2023 09:45) (96% - 97%)      PHYSICAL EXAM:  General: NAD, awake, alert  ENT: Moist mucous membranes  Neck: Supple, no JVD  Lungs: Clear to auscultation bilaterally, no wheezes  Cardio: S1 S2, regular rate and rhythm  Abdomen: Soft, nontender, nondistended, bowel sounds present  Extremities: No calf tenderness, no pitting edema    LABS:                        12.7   5.77  )-----------( 299      ( 21 Aug 2023 07:26 )             37.4       08-21    138  |  102  |  22  ----------------------------<  89  4.4   |  30  |  0.71    Ca    9.4      21 Aug 2023 07:26         08-07 Chol 176 mg/dL LDL -- HDL 99 mg/dL Trig 40 mg/dL          COVID-19 PCR: Kary (08-11-23 @ 05:04)      RADIOLOGY & ADDITIONAL TESTS:     Care Discussed with Consultants/Other Providers: PM&R

## 2023-09-11 ENCOUNTER — NON-APPOINTMENT (OUTPATIENT)
Age: 83
End: 2023-09-11

## 2023-09-20 ENCOUNTER — NON-APPOINTMENT (OUTPATIENT)
Age: 83
End: 2023-09-20

## 2023-09-20 ENCOUNTER — APPOINTMENT (OUTPATIENT)
Dept: CARDIOLOGY | Facility: CLINIC | Age: 83
End: 2023-09-20
Payer: MEDICARE

## 2023-09-20 VITALS
HEART RATE: 87 BPM | OXYGEN SATURATION: 97 % | WEIGHT: 142 LBS | DIASTOLIC BLOOD PRESSURE: 66 MMHG | RESPIRATION RATE: 19 BRPM | HEIGHT: 62 IN | SYSTOLIC BLOOD PRESSURE: 147 MMHG | BODY MASS INDEX: 26.13 KG/M2

## 2023-09-20 DIAGNOSIS — I10 ESSENTIAL (PRIMARY) HYPERTENSION: ICD-10-CM

## 2023-09-20 DIAGNOSIS — R94.31 ABNORMAL ELECTROCARDIOGRAM [ECG] [EKG]: ICD-10-CM

## 2023-09-20 DIAGNOSIS — R60.9 EDEMA, UNSPECIFIED: ICD-10-CM

## 2023-09-20 PROCEDURE — 99215 OFFICE O/P EST HI 40 MIN: CPT | Mod: 25

## 2023-09-20 PROCEDURE — 93000 ELECTROCARDIOGRAM COMPLETE: CPT

## 2023-09-20 RX ORDER — HYDROCHLOROTHIAZIDE 12.5 MG/1
12.5 TABLET ORAL
Qty: 90 | Refills: 0 | Status: DISCONTINUED | COMMUNITY
Start: 2022-04-02 | End: 2023-09-20

## 2023-09-29 ENCOUNTER — APPOINTMENT (OUTPATIENT)
Dept: PHYSICAL MEDICINE AND REHAB | Facility: CLINIC | Age: 83
End: 2023-09-29

## 2023-10-02 LAB
ALBUMIN SERPL ELPH-MCNC: 4.2 G/DL
ANION GAP SERPL CALC-SCNC: 10 MMOL/L
BUN SERPL-MCNC: 31 MG/DL
CALCIUM SERPL-MCNC: 9.8 MG/DL
CHLORIDE SERPL-SCNC: 98 MMOL/L
CO2 SERPL-SCNC: 29 MMOL/L
CREAT SERPL-MCNC: 0.82 MG/DL
EGFR: 70 ML/MIN/1.73M2
GLUCOSE SERPL-MCNC: 94 MG/DL
PHOSPHATE SERPL-MCNC: 2.5 MG/DL
POTASSIUM SERPL-SCNC: 3.9 MMOL/L
SODIUM SERPL-SCNC: 137 MMOL/L

## 2023-10-11 ENCOUNTER — APPOINTMENT (OUTPATIENT)
Dept: CARDIOLOGY | Facility: CLINIC | Age: 83
End: 2023-10-11
Payer: MEDICARE

## 2023-10-11 ENCOUNTER — APPOINTMENT (OUTPATIENT)
Dept: CARDIOLOGY | Facility: CLINIC | Age: 83
End: 2023-10-11

## 2023-10-11 PROCEDURE — 93306 TTE W/DOPPLER COMPLETE: CPT

## 2023-10-16 ENCOUNTER — APPOINTMENT (OUTPATIENT)
Dept: CARDIOLOGY | Facility: CLINIC | Age: 83
End: 2023-10-16
Payer: MEDICARE

## 2023-10-16 ENCOUNTER — NON-APPOINTMENT (OUTPATIENT)
Age: 83
End: 2023-10-16

## 2023-10-16 PROCEDURE — 93015 CV STRESS TEST SUPVJ I&R: CPT

## 2023-10-16 PROCEDURE — A9500: CPT

## 2023-10-16 PROCEDURE — 78452 HT MUSCLE IMAGE SPECT MULT: CPT

## 2023-11-13 PROCEDURE — 87635 SARS-COV-2 COVID-19 AMP PRB: CPT

## 2023-11-13 PROCEDURE — 97116 GAIT TRAINING THERAPY: CPT

## 2023-11-13 PROCEDURE — 97530 THERAPEUTIC ACTIVITIES: CPT

## 2023-11-13 PROCEDURE — 36415 COLL VENOUS BLD VENIPUNCTURE: CPT

## 2023-11-13 PROCEDURE — 74019 RADEX ABDOMEN 2 VIEWS: CPT

## 2023-11-13 PROCEDURE — 80053 COMPREHEN METABOLIC PANEL: CPT

## 2023-11-13 PROCEDURE — 97110 THERAPEUTIC EXERCISES: CPT

## 2023-11-13 PROCEDURE — 97163 PT EVAL HIGH COMPLEX 45 MIN: CPT

## 2023-11-13 PROCEDURE — 74177 CT ABD & PELVIS W/CONTRAST: CPT

## 2023-11-13 PROCEDURE — 80048 BASIC METABOLIC PNL TOTAL CA: CPT

## 2023-11-13 PROCEDURE — 74018 RADEX ABDOMEN 1 VIEW: CPT

## 2023-11-13 PROCEDURE — 85025 COMPLETE CBC W/AUTO DIFF WBC: CPT

## 2023-11-13 PROCEDURE — 97112 NEUROMUSCULAR REEDUCATION: CPT

## 2023-11-13 PROCEDURE — 97167 OT EVAL HIGH COMPLEX 60 MIN: CPT

## 2023-11-13 PROCEDURE — 85027 COMPLETE CBC AUTOMATED: CPT

## 2023-11-13 PROCEDURE — 97535 SELF CARE MNGMENT TRAINING: CPT

## 2023-11-20 ENCOUNTER — RX RENEWAL (OUTPATIENT)
Age: 83
End: 2023-11-20

## 2023-11-20 RX ORDER — FUROSEMIDE 20 MG/1
20 TABLET ORAL DAILY
Qty: 90 | Refills: 1 | Status: DISCONTINUED | COMMUNITY
Start: 2023-09-20 | End: 2023-11-20

## 2023-11-27 RX ORDER — SPIRONOLACTONE 25 MG/1
25 TABLET ORAL
Qty: 30 | Refills: 0 | Status: DISCONTINUED | COMMUNITY
Start: 2023-11-20 | End: 2023-11-27

## 2023-11-27 RX ORDER — HYDROCHLOROTHIAZIDE 12.5 MG/1
12.5 CAPSULE ORAL
Qty: 90 | Refills: 1 | Status: ACTIVE | COMMUNITY

## 2023-11-27 RX ORDER — AMLODIPINE BESYLATE 5 MG/1
5 TABLET ORAL DAILY
Qty: 30 | Refills: 0 | Status: ACTIVE | COMMUNITY
Start: 2022-08-12

## 2023-12-15 DIAGNOSIS — R12 HEARTBURN: ICD-10-CM

## 2023-12-15 RX ORDER — OMEPRAZOLE 20 MG/1
20 CAPSULE, DELAYED RELEASE ORAL
Qty: 90 | Refills: 1 | Status: ACTIVE | COMMUNITY
Start: 2023-12-15 | End: 1900-01-01

## 2024-01-25 ENCOUNTER — APPOINTMENT (OUTPATIENT)
Dept: ULTRASOUND IMAGING | Facility: HOSPITAL | Age: 84
End: 2024-01-25
Payer: MEDICARE

## 2024-01-25 ENCOUNTER — OUTPATIENT (OUTPATIENT)
Dept: OUTPATIENT SERVICES | Facility: HOSPITAL | Age: 84
LOS: 1 days | End: 2024-01-25
Payer: COMMERCIAL

## 2024-01-25 DIAGNOSIS — Z98.89 OTHER SPECIFIED POSTPROCEDURAL STATES: Chronic | ICD-10-CM

## 2024-01-25 DIAGNOSIS — Z00.8 ENCOUNTER FOR OTHER GENERAL EXAMINATION: ICD-10-CM

## 2024-01-25 DIAGNOSIS — Z90.710 ACQUIRED ABSENCE OF BOTH CERVIX AND UTERUS: Chronic | ICD-10-CM

## 2024-01-25 PROCEDURE — 76700 US EXAM ABDOM COMPLETE: CPT | Mod: 26

## 2024-01-25 PROCEDURE — 76700 US EXAM ABDOM COMPLETE: CPT

## 2024-04-12 ENCOUNTER — APPOINTMENT (OUTPATIENT)
Dept: UROLOGY | Facility: CLINIC | Age: 84
End: 2024-04-12
Payer: MEDICARE

## 2024-04-12 VITALS
DIASTOLIC BLOOD PRESSURE: 78 MMHG | SYSTOLIC BLOOD PRESSURE: 132 MMHG | WEIGHT: 142 LBS | HEIGHT: 62 IN | BODY MASS INDEX: 26.13 KG/M2 | OXYGEN SATURATION: 98 % | TEMPERATURE: 97.5 F | HEART RATE: 85 BPM

## 2024-04-12 DIAGNOSIS — N28.89 OTHER SPECIFIED DISORDERS OF KIDNEY AND URETER: ICD-10-CM

## 2024-04-12 DIAGNOSIS — N20.0 CALCULUS OF KIDNEY: ICD-10-CM

## 2024-04-12 DIAGNOSIS — N28.1 CYST OF KIDNEY, ACQUIRED: ICD-10-CM

## 2024-04-12 PROCEDURE — 99214 OFFICE O/P EST MOD 30 MIN: CPT

## 2024-04-12 RX ORDER — CALCIUM CARBONATE/VITAMIN D3 600 MG-20
TABLET ORAL
Refills: 0 | Status: ACTIVE | COMMUNITY

## 2024-04-12 RX ORDER — NITROFURANTOIN MACROCRYSTALS 100 MG/1
100 CAPSULE ORAL
Qty: 10 | Refills: 0 | Status: DISCONTINUED | COMMUNITY
Start: 2022-03-25 | End: 2024-04-12

## 2024-04-12 RX ORDER — AMOXICILLIN AND CLAVULANATE POTASSIUM 875; 125 MG/1; MG/1
875-125 TABLET, COATED ORAL
Qty: 20 | Refills: 0 | Status: DISCONTINUED | COMMUNITY
Start: 2021-11-18 | End: 2024-04-12

## 2024-04-12 RX ORDER — SODIUM PICOSULFATE, MAGNESIUM OXIDE, AND ANHYDROUS CITRIC ACID 10; 3.5; 12 MG/160ML; G/160ML; G/160ML
10-3.5-12 MG-GM LIQUID ORAL
Qty: 1 | Refills: 0 | Status: DISCONTINUED | COMMUNITY
Start: 2022-04-25 | End: 2024-04-12

## 2024-04-12 NOTE — HISTORY OF PRESENT ILLNESS
[FreeTextEntry1] : KD CONNELLY  84 year F presents for right renal cyst and echogenic area on right kidney. Patient obtained us for right abdominal pain.  Denies voiding issues or hematuria Patient recent with SBO surgery. Past history of hernia repair over sight of tenderness  US reviewed with patient and daughter.  [Urinary Incontinence] : no urinary incontinence [Urinary Retention] : no urinary retention [Urinary Urgency] : no urinary urgency

## 2024-04-12 NOTE — PHYSICAL EXAM
[Normal Appearance] : normal appearance [Well Groomed] : well groomed [General Appearance - In No Acute Distress] : no acute distress [Edema] : no peripheral edema [Respiration, Rhythm And Depth] : normal respiratory rhythm and effort [Exaggerated Use Of Accessory Muscles For Inspiration] : no accessory muscle use [Abdomen Soft] : soft [Abdomen Tenderness] : non-tender [Costovertebral Angle Tenderness] : no ~M costovertebral angle tenderness [Urinary Bladder Findings] : the bladder was normal on palpation [Normal Station and Gait] : the gait and station were normal for the patient's age [] : no rash [No Focal Deficits] : no focal deficits [Oriented To Time, Place, And Person] : oriented to person, place, and time [Affect] : the affect was normal [Mood] : the mood was normal [No Palpable Adenopathy] : no palpable adenopathy [de-identified] : palpable hardening in the subcutaneous layer below. hernia scar on right of abdomen. @ 4 cm x 4 cm.

## 2024-04-12 NOTE — ASSESSMENT
[FreeTextEntry1] : 83 y/o female with right incisional pain.  Recommend CT scan to evaluate soft tissue and ? renal stone of right kidney Telephone visit results Images reviewed with family   Renal cyst discussed with patient. Explained 50% of patients over 50 have renal cyst and are usually benign. Malignant risk less than 1% and require no follow up.   Prior to appointment and during encounter with patient extensive medical records were reviewed including but not limited to, hospital records, outpatient records, imaging results, and lab data. During this appointment the patient was examined, diagnoses were discussed and explained in a face to face manner. In addition, extensive time was spent reviewing aforementioned diagnostic studies. Counseling including abnormal image results, differential diagnoses, treatment options, risk and benefits, lifestyle changes, current condition, and current medications was performed. Patient's comments, questions, and concerns were addressed, and patient verbalized understanding.

## 2024-04-17 ENCOUNTER — APPOINTMENT (OUTPATIENT)
Dept: CT IMAGING | Facility: HOSPITAL | Age: 84
End: 2024-04-17
Payer: MEDICARE

## 2024-04-17 ENCOUNTER — OUTPATIENT (OUTPATIENT)
Dept: OUTPATIENT SERVICES | Facility: HOSPITAL | Age: 84
LOS: 1 days | End: 2024-04-17
Payer: COMMERCIAL

## 2024-04-17 DIAGNOSIS — Z90.710 ACQUIRED ABSENCE OF BOTH CERVIX AND UTERUS: Chronic | ICD-10-CM

## 2024-04-17 DIAGNOSIS — N20.0 CALCULUS OF KIDNEY: ICD-10-CM

## 2024-04-17 DIAGNOSIS — Z98.89 OTHER SPECIFIED POSTPROCEDURAL STATES: Chronic | ICD-10-CM

## 2024-04-17 PROCEDURE — 74176 CT ABD & PELVIS W/O CONTRAST: CPT

## 2024-04-17 PROCEDURE — 74176 CT ABD & PELVIS W/O CONTRAST: CPT | Mod: 26

## 2024-04-25 ENCOUNTER — NON-APPOINTMENT (OUTPATIENT)
Age: 84
End: 2024-04-25

## 2024-04-30 ENCOUNTER — APPOINTMENT (OUTPATIENT)
Dept: RADIOLOGY | Facility: HOSPITAL | Age: 84
End: 2024-04-30
Payer: MEDICARE

## 2024-04-30 ENCOUNTER — OUTPATIENT (OUTPATIENT)
Dept: OUTPATIENT SERVICES | Facility: HOSPITAL | Age: 84
LOS: 1 days | End: 2024-04-30
Payer: COMMERCIAL

## 2024-04-30 DIAGNOSIS — Z90.710 ACQUIRED ABSENCE OF BOTH CERVIX AND UTERUS: Chronic | ICD-10-CM

## 2024-04-30 DIAGNOSIS — Z98.89 OTHER SPECIFIED POSTPROCEDURAL STATES: Chronic | ICD-10-CM

## 2024-04-30 DIAGNOSIS — Z00.8 ENCOUNTER FOR OTHER GENERAL EXAMINATION: ICD-10-CM

## 2024-04-30 PROCEDURE — 73630 X-RAY EXAM OF FOOT: CPT

## 2024-04-30 PROCEDURE — 73630 X-RAY EXAM OF FOOT: CPT | Mod: 26,LT

## 2024-05-31 NOTE — ED ADULT NURSE NOTE - NS ED NURSE RECORD ANOTHER HT AND WT
Called patient with pre-procedure instructions for Loop implant on 6/7/24 at 1330    - Notify pt of arrival time of 1230PM and location 1 hr before procedure time   Procedure time is only an estimate.     Anticoagulation: (No hold for NOAC)     -Pt to stop solid foods 4 hrs before procedure (starting at 9:30am).    -Stop Clear liquids 2 hrs before procedure (starting at 11:30am).     -Instruct pt to shower the morning of the procedure, and then put on a clean shirt in order to help prevent infection.      -Instruct pt to bring their Apple ID or other cell phone ID equivalent for [employer] [employer]. This assures that at home monitoring can be set up before pt leaves the hospital. Add to note if pt does not have cell.      -Notify that only a local anesthetic used. No need for  nor do they need to have someone stay with them overnight.     -Pt to call [facility] at [phone] if pt has any new COVID like symptoms or if feeling unwell the evening prior or AM of procedure.     Called and left a VM for Pt to call back and get above education.                      Yes

## 2024-10-04 NOTE — DISCHARGE NOTE PROVIDER - NSDCCAREPROVSEEN_GEN_ALL_CORE_FT
Quality 130: Documentation Of Current Medications In The Medical Record: Current Medications Documented Detail Level: Detailed Quality 226: Preventive Care And Screening: Tobacco Use: Screening And Cessation Intervention: Patient screened for tobacco use and is an ex/non-smoker Mathew Patel

## 2025-02-01 ENCOUNTER — APPOINTMENT (OUTPATIENT)
Dept: RADIOLOGY | Facility: HOSPITAL | Age: 85
End: 2025-02-01
Payer: MEDICARE

## 2025-02-01 ENCOUNTER — OUTPATIENT (OUTPATIENT)
Dept: OUTPATIENT SERVICES | Facility: HOSPITAL | Age: 85
LOS: 1 days | End: 2025-02-01
Payer: COMMERCIAL

## 2025-02-01 DIAGNOSIS — Z90.710 ACQUIRED ABSENCE OF BOTH CERVIX AND UTERUS: Chronic | ICD-10-CM

## 2025-02-01 DIAGNOSIS — Z98.89 OTHER SPECIFIED POSTPROCEDURAL STATES: Chronic | ICD-10-CM

## 2025-02-01 DIAGNOSIS — Z00.8 ENCOUNTER FOR OTHER GENERAL EXAMINATION: ICD-10-CM

## 2025-02-01 PROCEDURE — 77080 DXA BONE DENSITY AXIAL: CPT | Mod: 26

## 2025-02-01 PROCEDURE — 77080 DXA BONE DENSITY AXIAL: CPT

## 2025-02-19 NOTE — PATIENT PROFILE ADULT - SURGICAL SITE DRAIN
Please continue to drink fluids to stay hydrated.  Follow-up with your primary care doctor.  If your symptoms worsen or you you develop fever, abdominal pain, nausea, vomiting please return to the emergency department.   no
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
## (undated) DEVICE — SPECIMEN CONTAINER PET

## (undated) DEVICE — TUBING IV EXTENSION 30"

## (undated) DEVICE — SOL IRR POUR H2O 1500ML

## (undated) DEVICE — Device

## (undated) DEVICE — CANISTER SUCTION LID GUARD 3000CC

## (undated) DEVICE — VENODYNE/SCD SLEEVE CALF MEDIUM

## (undated) DEVICE — SOLIDIFIER CANN EXPRESS 3K

## (undated) DEVICE — PACK MINOR

## (undated) DEVICE — GLV 7.5 PROTEXIS (WHITE)

## (undated) DEVICE — SHEARS COVIDIEN ENDO SHEAR 5MM X 31CM W UNIPOLAR CAUTERY

## (undated) DEVICE — DRSG STERISTRIPS 0.5 X 4"

## (undated) DEVICE — INSUFFLATION NDL COVIDIEN STEP 14G FOR STEP/VERSASTEP

## (undated) DEVICE — POSITIONER FOAM HEAD CRADLE (PINK)

## (undated) DEVICE — TROCAR COVIDIEN VERSASTEP PLUS 11MM STANDARD

## (undated) DEVICE — TUBING INSUFLATOR VIDEO TOWER NON HEATED

## (undated) DEVICE — GLV 6.5 PROTEXIS (WHITE)

## (undated) DEVICE — SUT POLYSORB 0 30" GS-23

## (undated) DEVICE — PREP CHLORAPREP HI-LITE ORANGE 26ML

## (undated) DEVICE — DISSECTOR ENDO PEANUT 5MM

## (undated) DEVICE — TROCAR COVIDIEN VERSASTEP 5MM STANDARD

## (undated) DEVICE — SCOPE WARMER SEAL DISP

## (undated) DEVICE — LAP PAD 18 X 18"

## (undated) DEVICE — DRAPE LIGHT HANDLE COVER (GREEN)

## (undated) DEVICE — LIGASURE BLUNT TIP 37CM

## (undated) DEVICE — ENDOCATCH 10MM SPECIMEN POUCH

## (undated) DEVICE — GLV 8 PROTEXIS (WHITE)

## (undated) DEVICE — SOL IRR POUR NS 0.9% 500ML

## (undated) DEVICE — PRESSURE INFUSOR BAG 1000ML

## (undated) DEVICE — TUBING HYDRO-SURG PLUS IRRIGATOR W SMOKEVAC & PROBE

## (undated) DEVICE — STAPLER COVIDIEN ENDO GIA STANDARD HANDLE

## (undated) DEVICE — POSITIONER STRAP ARMBOARD VELCRO TS-30

## (undated) DEVICE — WARMING BLANKET UPPER ADULT

## (undated) DEVICE — TROCAR COVIDIEN VERSAONE BLADED FIXATION 11MM STANDARD

## (undated) DEVICE — TUBING W FILTER STERILE FOR INSUFFLATION